# Patient Record
Sex: MALE | Race: WHITE | Employment: FULL TIME
[De-identification: names, ages, dates, MRNs, and addresses within clinical notes are randomized per-mention and may not be internally consistent; named-entity substitution may affect disease eponyms.]

---

## 2017-01-04 ENCOUNTER — NURSE ONLY (OUTPATIENT)
Dept: FAMILY MEDICINE CLINIC | Facility: CLINIC | Age: 57
End: 2017-01-04

## 2017-01-04 DIAGNOSIS — J30.2 SEASONAL ALLERGIC RHINITIS, UNSPECIFIED ALLERGIC RHINITIS TRIGGER: Primary | ICD-10-CM

## 2017-01-04 PROCEDURE — 95115 IMMUNOTHERAPY ONE INJECTION: CPT | Performed by: FAMILY MEDICINE

## 2017-01-18 ENCOUNTER — NURSE ONLY (OUTPATIENT)
Dept: FAMILY MEDICINE CLINIC | Facility: CLINIC | Age: 57
End: 2017-01-18

## 2017-01-18 DIAGNOSIS — J30.1 SEASONAL ALLERGIC RHINITIS DUE TO POLLEN: Primary | ICD-10-CM

## 2017-01-18 PROCEDURE — 95115 IMMUNOTHERAPY ONE INJECTION: CPT | Performed by: FAMILY MEDICINE

## 2017-02-01 ENCOUNTER — OFFICE VISIT (OUTPATIENT)
Dept: FAMILY MEDICINE CLINIC | Facility: CLINIC | Age: 57
End: 2017-02-01

## 2017-02-01 VITALS
BODY MASS INDEX: 33.6 KG/M2 | DIASTOLIC BLOOD PRESSURE: 86 MMHG | SYSTOLIC BLOOD PRESSURE: 128 MMHG | WEIGHT: 240 LBS | HEIGHT: 71 IN

## 2017-02-01 DIAGNOSIS — I10 ESSENTIAL HYPERTENSION: ICD-10-CM

## 2017-02-01 DIAGNOSIS — J30.2 SEASONAL ALLERGIC RHINITIS, UNSPECIFIED ALLERGIC RHINITIS TRIGGER: ICD-10-CM

## 2017-02-01 DIAGNOSIS — R51.9 FREQUENT HEADACHES: Primary | ICD-10-CM

## 2017-02-01 PROCEDURE — 99213 OFFICE O/P EST LOW 20 MIN: CPT | Performed by: FAMILY MEDICINE

## 2017-02-01 ASSESSMENT — ENCOUNTER SYMPTOMS
ORTHOPNEA: 0
BLURRED VISION: 0
SHORTNESS OF BREATH: 0

## 2017-02-06 ASSESSMENT — ENCOUNTER SYMPTOMS: GASTROINTESTINAL NEGATIVE: 1

## 2017-02-15 ENCOUNTER — NURSE ONLY (OUTPATIENT)
Dept: FAMILY MEDICINE CLINIC | Facility: CLINIC | Age: 57
End: 2017-02-15

## 2017-02-15 DIAGNOSIS — J30.2 SEASONAL ALLERGIC RHINITIS, UNSPECIFIED ALLERGIC RHINITIS TRIGGER: Primary | ICD-10-CM

## 2017-02-15 PROCEDURE — 95115 IMMUNOTHERAPY ONE INJECTION: CPT | Performed by: FAMILY MEDICINE

## 2017-03-01 ENCOUNTER — NURSE ONLY (OUTPATIENT)
Dept: FAMILY MEDICINE CLINIC | Facility: CLINIC | Age: 57
End: 2017-03-01

## 2017-03-01 DIAGNOSIS — J30.2 SEASONAL ALLERGIC RHINITIS, UNSPECIFIED ALLERGIC RHINITIS TRIGGER: Primary | ICD-10-CM

## 2017-03-01 PROCEDURE — 95115 IMMUNOTHERAPY ONE INJECTION: CPT | Performed by: FAMILY MEDICINE

## 2017-03-08 RX ORDER — HYDROCHLOROTHIAZIDE 25 MG/1
25 TABLET ORAL DAILY
Qty: 90 TABLET | Refills: 1 | Status: SHIPPED | OUTPATIENT
Start: 2017-03-08 | End: 2017-08-30 | Stop reason: SDUPTHER

## 2017-03-08 RX ORDER — LOSARTAN POTASSIUM 100 MG/1
100 TABLET ORAL DAILY
Qty: 90 TABLET | Refills: 1 | Status: SHIPPED | OUTPATIENT
Start: 2017-03-08 | End: 2017-08-30 | Stop reason: SDUPTHER

## 2017-03-15 ENCOUNTER — NURSE ONLY (OUTPATIENT)
Dept: FAMILY MEDICINE CLINIC | Age: 57
End: 2017-03-15
Payer: COMMERCIAL

## 2017-03-15 DIAGNOSIS — J30.2 SEASONAL ALLERGIC RHINITIS, UNSPECIFIED ALLERGIC RHINITIS TRIGGER: Primary | ICD-10-CM

## 2017-03-15 PROCEDURE — 95115 IMMUNOTHERAPY ONE INJECTION: CPT | Performed by: FAMILY MEDICINE

## 2017-03-29 ENCOUNTER — NURSE ONLY (OUTPATIENT)
Dept: FAMILY MEDICINE CLINIC | Age: 57
End: 2017-03-29
Payer: COMMERCIAL

## 2017-03-29 DIAGNOSIS — J30.2 SEASONAL ALLERGIC RHINITIS, UNSPECIFIED ALLERGIC RHINITIS TRIGGER: Primary | ICD-10-CM

## 2017-03-29 PROCEDURE — 95115 IMMUNOTHERAPY ONE INJECTION: CPT | Performed by: FAMILY MEDICINE

## 2017-04-12 ENCOUNTER — OFFICE VISIT (OUTPATIENT)
Dept: FAMILY MEDICINE CLINIC | Age: 57
End: 2017-04-12
Payer: COMMERCIAL

## 2017-04-12 VITALS
HEART RATE: 80 BPM | OXYGEN SATURATION: 97 % | TEMPERATURE: 98.7 F | SYSTOLIC BLOOD PRESSURE: 134 MMHG | DIASTOLIC BLOOD PRESSURE: 86 MMHG | HEIGHT: 71 IN | WEIGHT: 242 LBS | BODY MASS INDEX: 33.88 KG/M2

## 2017-04-12 DIAGNOSIS — J20.8 ACUTE VIRAL BRONCHITIS: Primary | ICD-10-CM

## 2017-04-12 PROCEDURE — 99213 OFFICE O/P EST LOW 20 MIN: CPT | Performed by: FAMILY MEDICINE

## 2017-04-12 RX ORDER — ALBUTEROL SULFATE 90 UG/1
2 AEROSOL, METERED RESPIRATORY (INHALATION) EVERY 4 HOURS PRN
Qty: 1 INHALER | Refills: 0 | Status: SHIPPED | OUTPATIENT
Start: 2017-04-12 | End: 2018-01-08 | Stop reason: ALTCHOICE

## 2017-04-12 ASSESSMENT — ENCOUNTER SYMPTOMS: GASTROINTESTINAL NEGATIVE: 1

## 2017-05-03 ENCOUNTER — NURSE ONLY (OUTPATIENT)
Dept: FAMILY MEDICINE CLINIC | Age: 57
End: 2017-05-03

## 2017-05-03 DIAGNOSIS — J30.2 SEASONAL ALLERGIC RHINITIS, UNSPECIFIED ALLERGIC RHINITIS TRIGGER: Primary | ICD-10-CM

## 2017-05-17 ENCOUNTER — NURSE ONLY (OUTPATIENT)
Dept: FAMILY MEDICINE CLINIC | Age: 57
End: 2017-05-17
Payer: COMMERCIAL

## 2017-05-17 DIAGNOSIS — J30.2 SEASONAL ALLERGIC RHINITIS, UNSPECIFIED ALLERGIC RHINITIS TRIGGER: Primary | ICD-10-CM

## 2017-05-17 PROCEDURE — 95115 IMMUNOTHERAPY ONE INJECTION: CPT | Performed by: FAMILY MEDICINE

## 2017-05-31 ENCOUNTER — NURSE ONLY (OUTPATIENT)
Dept: FAMILY MEDICINE CLINIC | Age: 57
End: 2017-05-31
Payer: COMMERCIAL

## 2017-05-31 DIAGNOSIS — J30.2 SEASONAL ALLERGIC RHINITIS, UNSPECIFIED ALLERGIC RHINITIS TRIGGER: Primary | ICD-10-CM

## 2017-05-31 PROCEDURE — 95115 IMMUNOTHERAPY ONE INJECTION: CPT | Performed by: FAMILY MEDICINE

## 2017-06-14 ENCOUNTER — NURSE ONLY (OUTPATIENT)
Dept: FAMILY MEDICINE CLINIC | Age: 57
End: 2017-06-14

## 2017-06-14 DIAGNOSIS — J30.89 ENVIRONMENTAL AND SEASONAL ALLERGIES: Primary | ICD-10-CM

## 2017-06-28 ENCOUNTER — NURSE ONLY (OUTPATIENT)
Dept: FAMILY MEDICINE CLINIC | Age: 57
End: 2017-06-28
Payer: COMMERCIAL

## 2017-06-28 DIAGNOSIS — J30.2 SEASONAL ALLERGIC RHINITIS, UNSPECIFIED ALLERGIC RHINITIS TRIGGER: Primary | ICD-10-CM

## 2017-06-28 PROCEDURE — 96372 THER/PROPH/DIAG INJ SC/IM: CPT | Performed by: FAMILY MEDICINE

## 2017-07-12 ENCOUNTER — NURSE ONLY (OUTPATIENT)
Dept: FAMILY MEDICINE CLINIC | Age: 57
End: 2017-07-12
Payer: COMMERCIAL

## 2017-07-12 DIAGNOSIS — J30.2 SEASONAL ALLERGIC RHINITIS, UNSPECIFIED ALLERGIC RHINITIS TRIGGER: Primary | ICD-10-CM

## 2017-07-12 PROCEDURE — 95115 IMMUNOTHERAPY ONE INJECTION: CPT | Performed by: FAMILY MEDICINE

## 2017-07-26 ENCOUNTER — NURSE ONLY (OUTPATIENT)
Dept: FAMILY MEDICINE CLINIC | Age: 57
End: 2017-07-26
Payer: COMMERCIAL

## 2017-07-26 DIAGNOSIS — J30.89 ENVIRONMENTAL AND SEASONAL ALLERGIES: Primary | ICD-10-CM

## 2017-07-26 PROCEDURE — 95115 IMMUNOTHERAPY ONE INJECTION: CPT | Performed by: FAMILY MEDICINE

## 2017-08-09 ENCOUNTER — NURSE ONLY (OUTPATIENT)
Dept: FAMILY MEDICINE CLINIC | Age: 57
End: 2017-08-09
Payer: COMMERCIAL

## 2017-08-09 DIAGNOSIS — J30.89 ENVIRONMENTAL AND SEASONAL ALLERGIES: Primary | ICD-10-CM

## 2017-08-09 PROCEDURE — 95115 IMMUNOTHERAPY ONE INJECTION: CPT | Performed by: FAMILY MEDICINE

## 2017-08-18 LAB
ALT SERPL-CCNC: 27 U/L
AST SERPL-CCNC: 17 U/L
AVERAGE GLUCOSE: 117
BASOPHILS ABSOLUTE: NORMAL /ΜL
BASOPHILS RELATIVE PERCENT: NORMAL %
BUN BLDV-MCNC: 13 MG/DL
CALCIUM SERPL-MCNC: 9.5 MG/DL
CHLORIDE BLD-SCNC: 104 MMOL/L
CHOLESTEROL, TOTAL: 229 MG/DL
CHOLESTEROL/HDL RATIO: 5.5
CO2: 22 MMOL/L
CREAT SERPL-MCNC: 0.91 MG/DL
EOSINOPHILS ABSOLUTE: NORMAL /ΜL
EOSINOPHILS RELATIVE PERCENT: NORMAL %
GFR CALCULATED: >60
GLUCOSE BLD-MCNC: 99 MG/DL
HBA1C MFR BLD: 5.7 %
HCT VFR BLD CALC: 47.7 % (ref 41–53)
HDLC SERPL-MCNC: 42 MG/DL (ref 35–70)
HEMOGLOBIN: 15.8 G/DL (ref 13.5–17.5)
LDL CHOLESTEROL CALCULATED: 160 MG/DL (ref 0–160)
LYMPHOCYTES ABSOLUTE: NORMAL /ΜL
LYMPHOCYTES RELATIVE PERCENT: NORMAL %
MCH RBC QN AUTO: 29.5 PG
MCHC RBC AUTO-ENTMCNC: 33.1 G/DL
MCV RBC AUTO: 89.2 FL
MONOCYTES ABSOLUTE: NORMAL /ΜL
MONOCYTES RELATIVE PERCENT: NORMAL %
NEUTROPHILS ABSOLUTE: NORMAL /ΜL
NEUTROPHILS RELATIVE PERCENT: NORMAL %
PDW BLD-RTO: 13.2 %
PLATELET # BLD: 247 K/ΜL
PMV BLD AUTO: NORMAL FL
POTASSIUM SERPL-SCNC: 4.3 MMOL/L
PROSTATE SPECIFIC ANTIGEN: 2.32 NG/ML
RBC # BLD: 5.36 10^6/ΜL
SODIUM BLD-SCNC: 142 MMOL/L
TRIGL SERPL-MCNC: 137 MG/DL
TSH SERPL DL<=0.05 MIU/L-ACNC: 1.65 UIU/ML
VLDLC SERPL CALC-MCNC: NORMAL MG/DL
WBC # BLD: 6.4 10^3/ML

## 2017-08-23 ENCOUNTER — NURSE ONLY (OUTPATIENT)
Dept: FAMILY MEDICINE CLINIC | Age: 57
End: 2017-08-23

## 2017-08-23 DIAGNOSIS — J30.89 ENVIRONMENTAL AND SEASONAL ALLERGIES: Primary | ICD-10-CM

## 2017-08-30 ENCOUNTER — OFFICE VISIT (OUTPATIENT)
Dept: FAMILY MEDICINE CLINIC | Age: 57
End: 2017-08-30
Payer: COMMERCIAL

## 2017-08-30 VITALS
WEIGHT: 249 LBS | BODY MASS INDEX: 34.86 KG/M2 | HEIGHT: 71 IN | SYSTOLIC BLOOD PRESSURE: 138 MMHG | DIASTOLIC BLOOD PRESSURE: 82 MMHG

## 2017-08-30 DIAGNOSIS — I10 ESSENTIAL HYPERTENSION: ICD-10-CM

## 2017-08-30 DIAGNOSIS — M26.629 TMJ SYNDROME: ICD-10-CM

## 2017-08-30 DIAGNOSIS — J30.89 ENVIRONMENTAL AND SEASONAL ALLERGIES: ICD-10-CM

## 2017-08-30 DIAGNOSIS — E78.00 PURE HYPERCHOLESTEROLEMIA: Primary | ICD-10-CM

## 2017-08-30 PROCEDURE — 99214 OFFICE O/P EST MOD 30 MIN: CPT | Performed by: FAMILY MEDICINE

## 2017-08-30 RX ORDER — LOSARTAN POTASSIUM 100 MG/1
100 TABLET ORAL DAILY
Qty: 90 TABLET | Refills: 1 | Status: SHIPPED | OUTPATIENT
Start: 2017-08-30 | End: 2018-03-09 | Stop reason: SDUPTHER

## 2017-08-30 RX ORDER — HYDROCHLOROTHIAZIDE 25 MG/1
25 TABLET ORAL DAILY
Qty: 90 TABLET | Refills: 1 | Status: SHIPPED | OUTPATIENT
Start: 2017-08-30 | End: 2018-03-09 | Stop reason: SDUPTHER

## 2017-08-31 RX ORDER — ASPIRIN 81 MG/1
81 TABLET ORAL DAILY
Qty: 90 TABLET | Refills: 3
Start: 2017-08-31 | End: 2018-03-09 | Stop reason: SDUPTHER

## 2017-08-31 ASSESSMENT — ENCOUNTER SYMPTOMS
GASTROINTESTINAL NEGATIVE: 1
RESPIRATORY NEGATIVE: 1

## 2017-09-06 ENCOUNTER — NURSE ONLY (OUTPATIENT)
Dept: FAMILY MEDICINE CLINIC | Age: 57
End: 2017-09-06

## 2017-09-06 DIAGNOSIS — J30.2 SEASONAL ALLERGIC RHINITIS, UNSPECIFIED ALLERGIC RHINITIS TRIGGER: Primary | ICD-10-CM

## 2017-09-20 ENCOUNTER — NURSE ONLY (OUTPATIENT)
Dept: FAMILY MEDICINE CLINIC | Age: 57
End: 2017-09-20
Payer: COMMERCIAL

## 2017-09-20 DIAGNOSIS — J30.89 ENVIRONMENTAL AND SEASONAL ALLERGIES: Primary | ICD-10-CM

## 2017-09-20 PROCEDURE — 95115 IMMUNOTHERAPY ONE INJECTION: CPT | Performed by: FAMILY MEDICINE

## 2017-10-04 ENCOUNTER — NURSE ONLY (OUTPATIENT)
Dept: FAMILY MEDICINE CLINIC | Age: 57
End: 2017-10-04

## 2017-10-04 DIAGNOSIS — J30.89 ENVIRONMENTAL AND SEASONAL ALLERGIES: Primary | ICD-10-CM

## 2017-10-25 ENCOUNTER — NURSE ONLY (OUTPATIENT)
Dept: FAMILY MEDICINE CLINIC | Age: 57
End: 2017-10-25
Payer: COMMERCIAL

## 2017-10-25 DIAGNOSIS — Z23 NEED FOR INFLUENZA VACCINATION: Primary | ICD-10-CM

## 2017-10-25 DIAGNOSIS — J30.89 ENVIRONMENTAL AND SEASONAL ALLERGIES: ICD-10-CM

## 2017-10-25 PROCEDURE — 90471 IMMUNIZATION ADMIN: CPT | Performed by: FAMILY MEDICINE

## 2017-10-25 PROCEDURE — 90686 IIV4 VACC NO PRSV 0.5 ML IM: CPT | Performed by: FAMILY MEDICINE

## 2017-10-25 NOTE — PROGRESS NOTES
After obtaining consent, and per orders of Dr. Catherine Sesay, injection of flu vaccine and allergen extract given in Left deltoid by Saul Walters. Patient instructed to remain in clinic for 20 minutes afterwards, and to report any adverse reaction to me immediately.

## 2017-11-08 ENCOUNTER — NURSE ONLY (OUTPATIENT)
Dept: FAMILY MEDICINE CLINIC | Age: 57
End: 2017-11-08
Payer: COMMERCIAL

## 2017-11-08 DIAGNOSIS — J30.89 ENVIRONMENTAL AND SEASONAL ALLERGIES: Primary | ICD-10-CM

## 2017-11-08 PROCEDURE — 96372 THER/PROPH/DIAG INJ SC/IM: CPT | Performed by: FAMILY MEDICINE

## 2017-11-22 ENCOUNTER — NURSE ONLY (OUTPATIENT)
Dept: FAMILY MEDICINE CLINIC | Age: 57
End: 2017-11-22

## 2017-11-22 DIAGNOSIS — J30.89 ENVIRONMENTAL AND SEASONAL ALLERGIES: Primary | ICD-10-CM

## 2017-12-06 ENCOUNTER — NURSE ONLY (OUTPATIENT)
Dept: FAMILY MEDICINE CLINIC | Age: 57
End: 2017-12-06

## 2017-12-06 DIAGNOSIS — J30.2 CHRONIC SEASONAL ALLERGIC RHINITIS, UNSPECIFIED TRIGGER: Primary | ICD-10-CM

## 2017-12-07 ENCOUNTER — HOSPITAL ENCOUNTER (OUTPATIENT)
Dept: GENERAL RADIOLOGY | Age: 57
Discharge: HOME | End: 2017-12-07
Payer: COMMERCIAL

## 2017-12-07 ENCOUNTER — HOSPITAL ENCOUNTER (OUTPATIENT)
Age: 57
Discharge: HOME | End: 2017-12-07
Payer: COMMERCIAL

## 2017-12-07 DIAGNOSIS — M25.561 PAIN IN BOTH KNEES, UNSPECIFIED CHRONICITY: ICD-10-CM

## 2017-12-07 DIAGNOSIS — M25.562 PAIN IN BOTH KNEES, UNSPECIFIED CHRONICITY: ICD-10-CM

## 2017-12-07 PROCEDURE — 73562 X-RAY EXAM OF KNEE 3: CPT

## 2017-12-18 ENCOUNTER — OFFICE VISIT (OUTPATIENT)
Dept: FAMILY MEDICINE CLINIC | Age: 57
End: 2017-12-18
Payer: COMMERCIAL

## 2017-12-18 VITALS
SYSTOLIC BLOOD PRESSURE: 120 MMHG | BODY MASS INDEX: 34.87 KG/M2 | HEART RATE: 78 BPM | WEIGHT: 250 LBS | DIASTOLIC BLOOD PRESSURE: 80 MMHG

## 2017-12-18 DIAGNOSIS — J01.40 ACUTE NON-RECURRENT PANSINUSITIS: Primary | ICD-10-CM

## 2017-12-18 PROCEDURE — 99213 OFFICE O/P EST LOW 20 MIN: CPT | Performed by: FAMILY MEDICINE

## 2017-12-18 RX ORDER — FLUTICASONE PROPIONATE 50 MCG
1 SPRAY, SUSPENSION (ML) NASAL DAILY
Qty: 1 BOTTLE | Refills: 3 | Status: SHIPPED | OUTPATIENT
Start: 2017-12-18 | End: 2018-03-09 | Stop reason: SDUPTHER

## 2017-12-18 RX ORDER — AMOXICILLIN AND CLAVULANATE POTASSIUM 875; 125 MG/1; MG/1
1 TABLET, FILM COATED ORAL 2 TIMES DAILY
Qty: 14 TABLET | Refills: 0 | Status: SHIPPED | OUTPATIENT
Start: 2017-12-18 | End: 2017-12-25

## 2017-12-18 NOTE — PROGRESS NOTES
Patient presents today for sinus pressure.  Patient states he has had this for about 5 days, sinus pressure and \"green nasal drainage\"

## 2017-12-18 NOTE — PROGRESS NOTES
light-headedness. Physical Exam:     Physical Exam   Constitutional: He is oriented to person, place, and time. He appears well-developed and well-nourished. HENT:   Head: Normocephalic and atraumatic. Right Ear: External ear normal.   Left Ear: External ear normal.   Nose: Rhinorrhea present. Right sinus exhibits maxillary sinus tenderness and frontal sinus tenderness. Left sinus exhibits maxillary sinus tenderness and frontal sinus tenderness. Eyes: Conjunctivae and EOM are normal.   Neck: Normal range of motion. Neck supple. No thyromegaly present. Cardiovascular: Normal rate, regular rhythm and normal heart sounds. Exam reveals no gallop and no friction rub. No murmur heard. Pulmonary/Chest: Effort normal and breath sounds normal. No respiratory distress. He has no wheezes. He has no rales. He exhibits no tenderness. Abdominal: Soft. Bowel sounds are normal. He exhibits no distension. There is no tenderness. There is no rebound and no guarding. Musculoskeletal: Normal range of motion. He exhibits no edema. Lymphadenopathy:     He has no cervical adenopathy. Neurological: He is alert and oriented to person, place, and time. He exhibits normal muscle tone. Coordination normal.   Skin: Skin is warm and dry. No rash noted. No erythema. Nursing note and vitals reviewed.       Vitals:  /80   Pulse 78   Wt 250 lb (113.4 kg)   BMI 34.87 kg/m²       Data:     Lab Results   Component Value Date     08/18/2017    K 4.3 08/18/2017     08/18/2017    CO2 22 08/18/2017    BUN 13 08/18/2017    CREATININE 0.91 08/18/2017    GLUCOSE 99 08/18/2017    PROT 6.7 11/23/2016    LABALBU 4.3 11/23/2016    BILITOT 0.39 11/23/2016    ALKPHOS 69 11/23/2016    AST 17 08/18/2017    ALT 27 08/18/2017     Lab Results   Component Value Date    WBC 6.4 08/18/2017    RBC 5.36 08/18/2017    HGB 15.8 08/18/2017    HCT 47.7 08/18/2017    MCV 89.2 08/18/2017    MCH 29.5 08/18/2017    MCHC 33.1 2017    RDW 13.2 2017     2017    MPV NOT REPORTED 2016     Lab Results   Component Value Date    TSH 1.65 2017     Lab Results   Component Value Date    CHOL 229 2017    HDL 42 2017    PSA 2.32 2017    LABA1C 5.7 2017          Assessment:       1. Acute non-recurrent pansinusitis         Plan:   1. Acute nonrecurrent pansinusitisRx Augmentin, Rx Flonase for symptomatic relief. Recommend Tylenol/ibuprofen as needed for pain/fever. Recommend increased fluid intake. Follow-up if not improving. Completed Refills   Requested Prescriptions     Signed Prescriptions Disp Refills    fluticasone (FLONASE ALLERGY RELIEF) 50 MCG/ACT nasal spray 1 Bottle 3     Si spray by Nasal route daily    amoxicillin-clavulanate (AUGMENTIN) 875-125 MG per tablet 14 tablet 0     Sig: Take 1 tablet by mouth 2 times daily for 7 days     Return if symptoms worsen or fail to improve. Orders Placed This Encounter   Medications    fluticasone (FLONASE ALLERGY RELIEF) 50 MCG/ACT nasal spray     Si spray by Nasal route daily     Dispense:  1 Bottle     Refill:  3    amoxicillin-clavulanate (AUGMENTIN) 875-125 MG per tablet     Sig: Take 1 tablet by mouth 2 times daily for 7 days     Dispense:  14 tablet     Refill:  0     No orders of the defined types were placed in this encounter. There are no Patient Instructions on file for this visit.     Electronically signed by Shelia Bronson DO on 2017 at 3:02 PM         Completed Refills   Requested Prescriptions     Signed Prescriptions Disp Refills    fluticasone (FLONASE ALLERGY RELIEF) 50 MCG/ACT nasal spray 1 Bottle 3     Si spray by Nasal route daily    amoxicillin-clavulanate (AUGMENTIN) 875-125 MG per tablet 14 tablet 0     Sig: Take 1 tablet by mouth 2 times daily for 7 days         Kirsten Stagers received counseling on the following healthy behaviors: nutrition, exercise and medication adherence  Reviewed prior labs and health maintenance. Continue current medications, diet and exercise. Discussed use, benefit, and side effects of prescribed medications. Barriers to medication compliance addressed. Patient given educational materials - see patient instructions. All patient questions answered. Patient voiced understanding.

## 2017-12-29 ASSESSMENT — ENCOUNTER SYMPTOMS
SINUS PAIN: 1
WHEEZING: 0
ABDOMINAL DISTENTION: 0
TROUBLE SWALLOWING: 0
COLOR CHANGE: 0
SINUS PRESSURE: 1
DIARRHEA: 0
PHOTOPHOBIA: 0
ABDOMINAL PAIN: 0
BACK PAIN: 0
COUGH: 0
NAUSEA: 0
CONSTIPATION: 0
RHINORRHEA: 1
VOMITING: 0
SHORTNESS OF BREATH: 0
FACIAL SWELLING: 0

## 2018-01-04 ENCOUNTER — NURSE ONLY (OUTPATIENT)
Dept: FAMILY MEDICINE CLINIC | Age: 58
End: 2018-01-04

## 2018-01-04 DIAGNOSIS — J30.89 ENVIRONMENTAL AND SEASONAL ALLERGIES: Primary | ICD-10-CM

## 2018-01-05 ENCOUNTER — TELEPHONE (OUTPATIENT)
Dept: FAMILY MEDICINE CLINIC | Age: 58
End: 2018-01-05

## 2018-01-05 RX ORDER — AZITHROMYCIN 250 MG/1
TABLET, FILM COATED ORAL
Qty: 1 PACKET | Refills: 0 | Status: SHIPPED | OUTPATIENT
Start: 2018-01-05 | End: 2018-01-08 | Stop reason: ALTCHOICE

## 2018-01-05 NOTE — TELEPHONE ENCOUNTER
Daniel Shaikh was in the office on 12/18 for Sinusitis. He was given amoxicillin. Wife said he had gotten better but is now right where he was before. He is having the sinus pressure, drainage, cough, chest congestion. She would like to know if we can just call in a refill for him. Please let Khalif know. DM-maximilian    Health Maintenance   Topic Date Due    Potassium monitoring  1960    Creatinine monitoring  1962    Hepatitis C screen  1960    HIV screen  04/04/1977    DTaP/Tdap/Td vaccine (1 - Tdap) 04/04/1981    A1C test (Diabetic or Prediabetic)  08/18/2018    Lipid screen  08/18/2022    Colon cancer screen colonoscopy  07/11/2026    Flu vaccine  Completed             (applicable per patient's age: Cancer Screenings, Depression Screening, Fall Risk Screening, Immunizations)    Hemoglobin A1C (%)   Date Value   08/18/2017 5.7     LDL Cholesterol (mg/dL)   Date Value   02/13/2016 125     LDL Calculated (mg/dL)   Date Value   08/18/2017 160     AST (U/L)   Date Value   08/18/2017 17     ALT (U/L)   Date Value   08/18/2017 27     BUN (mg/dL)   Date Value   08/18/2017 13      (goal A1C is < 7)   (goal LDL is <100) need 30-50% reduction from baseline     BP Readings from Last 3 Encounters:   12/18/17 120/80   08/30/17 138/82   04/12/17 134/86    (goal /80)      All Future Testing planned in [facility]:  Lab Frequency Next Occurrence       Next Visit Date:  No future appointments.          Patient Active Problem List:     HTN (hypertension)     GERD (gastroesophageal reflux disease)     Seasonal allergies     Pure hypercholesterolemia     Arthritis     Encounter for screening colonoscopy     Environmental and seasonal allergies

## 2018-01-08 ENCOUNTER — HOSPITAL ENCOUNTER (OUTPATIENT)
Dept: GENERAL RADIOLOGY | Age: 58
Discharge: HOME | End: 2018-01-08
Payer: COMMERCIAL

## 2018-01-08 ENCOUNTER — HOSPITAL ENCOUNTER (OUTPATIENT)
Age: 58
Discharge: HOME | End: 2018-01-08
Payer: COMMERCIAL

## 2018-01-08 ENCOUNTER — OFFICE VISIT (OUTPATIENT)
Dept: FAMILY MEDICINE CLINIC | Age: 58
End: 2018-01-08
Payer: COMMERCIAL

## 2018-01-08 VITALS
SYSTOLIC BLOOD PRESSURE: 115 MMHG | WEIGHT: 250 LBS | OXYGEN SATURATION: 96 % | TEMPERATURE: 99.4 F | BODY MASS INDEX: 34.87 KG/M2 | HEART RATE: 80 BPM | DIASTOLIC BLOOD PRESSURE: 82 MMHG

## 2018-01-08 DIAGNOSIS — R05.9 COUGH: ICD-10-CM

## 2018-01-08 DIAGNOSIS — J40 BRONCHITIS: Primary | ICD-10-CM

## 2018-01-08 PROCEDURE — 71046 X-RAY EXAM CHEST 2 VIEWS: CPT

## 2018-01-08 PROCEDURE — 99213 OFFICE O/P EST LOW 20 MIN: CPT | Performed by: FAMILY MEDICINE

## 2018-01-08 RX ORDER — LEVOFLOXACIN 500 MG/1
500 TABLET, FILM COATED ORAL DAILY
Qty: 7 TABLET | Refills: 0 | Status: SHIPPED | OUTPATIENT
Start: 2018-01-08 | End: 2018-01-15

## 2018-01-14 ASSESSMENT — ENCOUNTER SYMPTOMS
SHORTNESS OF BREATH: 0
FACIAL SWELLING: 0
ABDOMINAL DISTENTION: 0
WHEEZING: 0
COLOR CHANGE: 0
DIARRHEA: 0
PHOTOPHOBIA: 0
RHINORRHEA: 1
COUGH: 1
TROUBLE SWALLOWING: 0
CONSTIPATION: 0
NAUSEA: 0
ABDOMINAL PAIN: 0
VOMITING: 0
BACK PAIN: 0

## 2018-01-14 NOTE — PROGRESS NOTES
12/18/17  Yes Irl Garden, DO   aspirin EC 81 MG EC tablet Take 1 tablet by mouth daily 8/31/17  Yes Casimer Prior, DO   hydrochlorothiazide (HYDRODIURIL) 25 MG tablet Take 1 tablet by mouth daily 8/30/17  Yes Casimer Prior, DO   losartan (COZAAR) 100 MG tablet Take 1 tablet by mouth daily 8/30/17  Yes Casimer Prior, DO   butalbital-acetaminophen-caffeine (FIORICET, ESGIC) -46 MG per tablet Take 1-2 tablets by mouth every 6 hours as needed for Headaches 12/28/16  Yes Casimer Prior, DO   GARLIC PO Take by mouth daily    Yes Historical Provider, MD   Omega-3 Fatty Acids (FISH OIL) 1200 MG CPDR Take by mouth 3 times daily    Yes Historical Provider, MD Fausto TAMEZ by Does not apply route 2 times daily    Yes Historical Provider, MD        Allergies:       Pneumococcal vaccines; Statins; and Tetracyclines & related    Social History:     Tobacco:    reports that he has never smoked. He has never used smokeless tobacco.  Alcohol:      reports that he drinks about 2.4 oz of alcohol per week . Drug Use:  reports that he does not use drugs. Family History:     Family History   Problem Relation Age of Onset    Arthritis Mother     Heart Attack Father        Review of Systems:     Positive and Negative as described in HPI    Review of Systems   Constitutional: Positive for chills. Negative for activity change, appetite change, fever and unexpected weight change. HENT: Positive for rhinorrhea. Negative for ear pain, facial swelling and trouble swallowing. Eyes: Negative for photophobia and visual disturbance. Respiratory: Positive for cough. Negative for shortness of breath and wheezing. Cardiovascular: Negative for chest pain and palpitations. Gastrointestinal: Negative for abdominal distention, abdominal pain, constipation, diarrhea, nausea and vomiting. Endocrine: Negative for polydipsia, polyphagia and polyuria.    Musculoskeletal: Negative for arthralgias, back pain, gait visit. Electronically signed by Sal Lang DO on 1/14/2018 at 2:30 PM         Completed Refills   Requested Prescriptions     Signed Prescriptions Disp Refills    levofloxacin (LEVAQUIN) 500 MG tablet 7 tablet 0     Sig: Take 1 tablet by mouth daily for 7 days    HYDROcodone-homatropine (HYCODAN) 5-1.5 MG/5ML syrup 140 mL 0     Sig: Take 5 mLs by mouth 4 times daily as needed (cough) for up to 7 days. Afsaneh [place] received counseling on the following healthy behaviors: nutrition, exercise and medication adherence  Reviewed prior labs and health maintenance. Continue current medications, diet and exercise. Discussed use, benefit, and side effects of prescribed medications. Barriers to medication compliance addressed. Patient given educational materials - see patient instructions. All patient questions answered. Patient voiced understanding.

## 2018-01-25 ENCOUNTER — OFFICE VISIT (OUTPATIENT)
Dept: FAMILY MEDICINE CLINIC | Age: 58
End: 2018-01-25
Payer: COMMERCIAL

## 2018-01-25 VITALS
HEART RATE: 75 BPM | WEIGHT: 253 LBS | HEIGHT: 71 IN | TEMPERATURE: 99.2 F | SYSTOLIC BLOOD PRESSURE: 110 MMHG | DIASTOLIC BLOOD PRESSURE: 80 MMHG | BODY MASS INDEX: 35.42 KG/M2 | OXYGEN SATURATION: 97 %

## 2018-01-25 DIAGNOSIS — J01.40 ACUTE NON-RECURRENT PANSINUSITIS: Primary | ICD-10-CM

## 2018-01-25 PROCEDURE — 99213 OFFICE O/P EST LOW 20 MIN: CPT | Performed by: NURSE PRACTITIONER

## 2018-01-25 RX ORDER — AMOXICILLIN AND CLAVULANATE POTASSIUM 875; 125 MG/1; MG/1
1 TABLET, FILM COATED ORAL 2 TIMES DAILY
Qty: 20 TABLET | Refills: 0 | Status: SHIPPED | OUTPATIENT
Start: 2018-01-25 | End: 2018-02-04

## 2018-01-25 ASSESSMENT — ENCOUNTER SYMPTOMS
VOMITING: 0
SINUS PAIN: 1
NAUSEA: 0
SHORTNESS OF BREATH: 0
SINUS PRESSURE: 1
HOARSE VOICE: 1
DIARRHEA: 0
COUGH: 1

## 2018-01-25 ASSESSMENT — PATIENT HEALTH QUESTIONNAIRE - PHQ9
SUM OF ALL RESPONSES TO PHQ9 QUESTIONS 1 & 2: 0
SUM OF ALL RESPONSES TO PHQ QUESTIONS 1-9: 0
1. LITTLE INTEREST OR PLEASURE IN DOING THINGS: 0
2. FEELING DOWN, DEPRESSED OR HOPELESS: 0

## 2018-01-25 NOTE — PROGRESS NOTES
End Date Taking? Authorizing Provider   amoxicillin-clavulanate (AUGMENTIN) 875-125 MG per tablet Take 1 tablet by mouth 2 times daily for 10 days 1/25/18 2/4/18 Yes Bryon Mckeon CNP   fluticasone [facility] ALLERGY RELIEF) 50 MCG/ACT nasal spray 1 spray by Nasal route daily 12/18/17  Yes Osiel Beavers DO   aspirin EC 81 MG EC tablet Take 1 tablet by mouth daily 8/31/17  Yes Shaune Schaumann, DO   hydrochlorothiazide (HYDRODIURIL) 25 MG tablet Take 1 tablet by mouth daily 8/30/17  Yes Shaune Schaumann, DO   losartan (COZAAR) 100 MG tablet Take 1 tablet by mouth daily 8/30/17  Yes Shaune Schaumann, DO   GARLIC PO Take by mouth daily    Yes Historical Provider, MD   Omega-3 Fatty Acids (FISH OIL) 1200 MG CPDR Take by mouth 3 times daily    Yes Historical Provider, MD Matilda Abbott POWD by Does not apply route 2 times daily    Yes Historical Provider, MD        Allergies:       Pneumococcal vaccines; Statins; and Tetracyclines & related    Social History:     Tobacco:    reports that he has never smoked. He quit smokeless tobacco use about 10 years ago. His smokeless tobacco use included Snuff. Alcohol:      reports that he drinks about 2.4 oz of alcohol per week . Drug Use:  reports that he does not use drugs. Family History:     Family History   Problem Relation Age of Onset    Arthritis Mother     Heart Attack Father        Review of Systems:         Review of Systems   Constitutional: Positive for fever. Negative for chills. HENT: Positive for congestion, hoarse voice, postnasal drip, sinus pain and sinus pressure. Respiratory: Positive for cough. Negative for shortness of breath. Cardiovascular: Negative for chest pain and palpitations. Gastrointestinal: Negative for diarrhea, nausea and vomiting.          Physical Exam:     Vitals:  /80   Pulse 75   Temp 99.2 °F (37.3 °C) (Oral)   Ht 5' 11\" (1.803 m)   Wt 253 lb (114.8 kg)   SpO2 97%   BMI 35.29 kg/m²       Physical Exam Constitutional: He appears well-developed and well-nourished. He is cooperative. HENT:   Right Ear: Tympanic membrane normal.   Left Ear: Tympanic membrane normal.   Nose: Mucosal edema present. Right sinus exhibits maxillary sinus tenderness and frontal sinus tenderness. Left sinus exhibits maxillary sinus tenderness and frontal sinus tenderness. Mouth/Throat: Posterior oropharyngeal erythema present. No oropharyngeal exudate. Cardiovascular: Normal rate, regular rhythm, S1 normal, S2 normal and normal heart sounds. Pulmonary/Chest: Effort normal and breath sounds normal. No respiratory distress. Neurological: He is alert. Nursing note and vitals reviewed. Data:     Lab Results   Component Value Date     08/18/2017    K 4.3 08/18/2017     08/18/2017    CO2 22 08/18/2017    BUN 13 08/18/2017    CREATININE 0.91 08/18/2017    GLUCOSE 99 08/18/2017    PROT 6.7 11/23/2016    LABALBU 4.3 11/23/2016    BILITOT 0.39 11/23/2016    ALKPHOS 69 11/23/2016    AST 17 08/18/2017    ALT 27 08/18/2017     Lab Results   Component Value Date    WBC 6.4 08/18/2017    RBC 5.36 08/18/2017    HGB 15.8 08/18/2017    HCT 47.7 08/18/2017    MCV 89.2 08/18/2017    MCH 29.5 08/18/2017    MCHC 33.1 08/18/2017    RDW 13.2 08/18/2017     08/18/2017    MPV NOT REPORTED 12/13/2016     Lab Results   Component Value Date    TSH 1.65 08/18/2017     Lab Results   Component Value Date    CHOL 229 08/18/2017    HDL 42 08/18/2017    PSA 2.32 08/18/2017    LABA1C 5.7 08/18/2017          Assessment & Plan       1. Acute non-recurrent pansinusitis       We'll treat patient with Augmentin. Patient educated about medication possible side effects. Patient instructed to continue drinking lots of fluids and getting some rest.  Patient may continue using Flonase 1 spray each nostril twice a day. May continue taking Claritin. Patient verbalizes understanding and agreement with plan. All questions answered.  If

## 2018-01-25 NOTE — PATIENT INSTRUCTIONS
SURVEY:    You may be receiving a survey from [employer] regarding your visit today. Please complete the survey to enable us to provide the highest quality of care to you and your family. If you cannot score us a very good on any question, please call the office to discuss how we could of made your experience a very good one. Thank you.

## 2018-03-09 ENCOUNTER — OFFICE VISIT (OUTPATIENT)
Dept: FAMILY MEDICINE CLINIC | Age: 58
End: 2018-03-09
Payer: COMMERCIAL

## 2018-03-09 VITALS
BODY MASS INDEX: 35.14 KG/M2 | SYSTOLIC BLOOD PRESSURE: 122 MMHG | HEIGHT: 71 IN | DIASTOLIC BLOOD PRESSURE: 82 MMHG | WEIGHT: 251 LBS

## 2018-03-09 DIAGNOSIS — E78.00 PURE HYPERCHOLESTEROLEMIA: ICD-10-CM

## 2018-03-09 DIAGNOSIS — I10 ESSENTIAL HYPERTENSION: Primary | ICD-10-CM

## 2018-03-09 DIAGNOSIS — J30.89 ENVIRONMENTAL AND SEASONAL ALLERGIES: ICD-10-CM

## 2018-03-09 PROCEDURE — 99214 OFFICE O/P EST MOD 30 MIN: CPT | Performed by: FAMILY MEDICINE

## 2018-03-09 RX ORDER — FLUTICASONE PROPIONATE 50 MCG
1 SPRAY, SUSPENSION (ML) NASAL DAILY
Qty: 1 BOTTLE | Refills: 3 | Status: SHIPPED | OUTPATIENT
Start: 2018-03-09

## 2018-03-09 RX ORDER — HYDROCHLOROTHIAZIDE 25 MG/1
25 TABLET ORAL DAILY
Qty: 90 TABLET | Refills: 1 | Status: SHIPPED | OUTPATIENT
Start: 2018-03-09 | End: 2018-08-31 | Stop reason: SDUPTHER

## 2018-03-09 RX ORDER — ASPIRIN 81 MG/1
81 TABLET ORAL DAILY
Qty: 90 TABLET | Refills: 3 | Status: SHIPPED | OUTPATIENT
Start: 2018-03-09 | End: 2019-03-06 | Stop reason: SDUPTHER

## 2018-03-09 RX ORDER — LOSARTAN POTASSIUM 100 MG/1
100 TABLET ORAL DAILY
Qty: 90 TABLET | Refills: 1 | Status: SHIPPED | OUTPATIENT
Start: 2018-03-09 | End: 2018-08-31 | Stop reason: SDUPTHER

## 2018-03-09 ASSESSMENT — PATIENT HEALTH QUESTIONNAIRE - PHQ9
SUM OF ALL RESPONSES TO PHQ QUESTIONS 1-9: 0
2. FEELING DOWN, DEPRESSED OR HOPELESS: 0
SUM OF ALL RESPONSES TO PHQ9 QUESTIONS 1 & 2: 0
1. LITTLE INTEREST OR PLEASURE IN DOING THINGS: 0

## 2018-03-09 NOTE — PROGRESS NOTES
Yes Historical Provider, MD Grey Milena POWD by Does not apply route 2 times daily    Yes Historical Provider, MD        Allergies:       Pneumococcal vaccines; Statins; and Tetracyclines & related    Social History:     Tobacco:    reports that he has never smoked. He quit smokeless tobacco use about 10 years ago. His smokeless tobacco use included Snuff. Alcohol:      reports that he drinks about 2.4 oz of alcohol per week . Drug Use:  reports that he does not use drugs. Family History:     Family History   Problem Relation Age of Onset    Arthritis Mother     Heart Attack Father        Review of Systems:     Positive and Negative as described in HPI    Review of Systems   Constitutional: Negative. Respiratory: Negative. Gastrointestinal: Negative. Physical Exam:     Vitals:  /82   Ht 5' 11\" (1.803 m)   Wt 251 lb (113.9 kg)   BMI 35.01 kg/m²   Physical Exam   Constitutional: He appears well-developed and well-nourished. No distress. HENT:   Right Ear: Tympanic membrane and ear canal normal.   Left Ear: Tympanic membrane and ear canal normal.   Nose: Nose normal.   Mouth/Throat: Oropharynx is clear and moist and mucous membranes are normal.   Eyes: Conjunctivae and EOM are normal. Pupils are equal, round, and reactive to light. Neck: Neck supple. Cardiovascular: Normal rate, regular rhythm, normal heart sounds and intact distal pulses. No peripheral edema. Pulmonary/Chest: Effort normal and breath sounds normal.   Musculoskeletal: He exhibits no edema. Lymphadenopathy:     He has no cervical adenopathy. Skin: Skin is warm and dry. Psychiatric: He has a normal mood and affect. Judgment normal.   Nursing note and vitals reviewed.       Data:     Lab Results   Component Value Date     08/18/2017    K 4.3 08/18/2017     08/18/2017    CO2 22 08/18/2017    BUN 13 08/18/2017    CREATININE 0.91 08/18/2017    GLUCOSE 99 08/18/2017    PROT 6.7 11/23/2016 LABALBU 4.3 2016    BILITOT 0.39 2016    ALKPHOS 69 2016    AST 17 2017    ALT 27 2017     Lab Results   Component Value Date    WBC 6.4 2017    RBC 5.36 2017    HGB 15.8 2017    HCT 47.7 2017    MCV 89.2 2017    MCH 29.5 2017    MCHC 33.1 2017    RDW 13.2 2017     2017    MPV NOT REPORTED 2016     Lab Results   Component Value Date    TSH 1.65 2017     Lab Results   Component Value Date    CHOL 229 2017    HDL 42 2017    PSA 2.32 2017    LABA1C 5.7 2017         Assessment & Plan:       1. Essential hypertension     2. Pure hypercholesterolemia     3. Environmental and seasonal allergies     HTN controlled, cont current regimen. Consider decreasing losartan dose in next few mos if readings remain at goal.  HLD intolerant of statins. Cont to work on diet and exercise. Start baby ASA daily for cardiovascular protection. Labs in August.  Allergic rhinitis controlled on flonase. Stay off IT injections as long as able, then can restart per Dr Donnell France guidance. Requested Prescriptions     Signed Prescriptions Disp Refills    aspirin EC 81 MG EC tablet 90 tablet 3     Sig: Take 1 tablet by mouth daily    hydrochlorothiazide (HYDRODIURIL) 25 MG tablet 90 tablet 1     Sig: Take 1 tablet by mouth daily    losartan (COZAAR) 100 MG tablet 90 tablet 1     Sig: Take 1 tablet by mouth daily    fluticasone (FLONASE ALLERGY RELIEF) 50 MCG/ACT nasal spray 1 Bottle 3     Si spray by Nasal route daily       Patient Instructions     SURVEY:    You may be receiving a survey from [employer] regarding your visit today. Please complete the survey to enable us to provide the highest quality of care to you and your family. If you cannot score us as very good on any question, please call the office to discuss how we could have made your experience exceptional.     Thank you.         Moriah Lane received counseling on the following healthy behaviors: nutrition, exercise and medication adherence  Reviewed prior labs and health maintenance. Continue current medications, diet and exercise. Discussed use, benefit, and side effects of prescribed medications. Barriers to medication compliance addressed. Patient given educational materials - see patient instructions. All patient questions answered. Patient voiced understanding.      Electronically signed by Brenda Badillo DO on 3/11/2018 at 8:49 PM

## 2018-03-11 ASSESSMENT — ENCOUNTER SYMPTOMS
RESPIRATORY NEGATIVE: 1
GASTROINTESTINAL NEGATIVE: 1

## 2018-05-01 ENCOUNTER — HOSPITAL ENCOUNTER (OUTPATIENT)
Dept: GENERAL RADIOLOGY | Age: 58
Discharge: HOME | End: 2018-05-03
Payer: COMMERCIAL

## 2018-05-01 ENCOUNTER — OFFICE VISIT (OUTPATIENT)
Dept: FAMILY MEDICINE CLINIC | Age: 58
End: 2018-05-01
Payer: COMMERCIAL

## 2018-05-01 ENCOUNTER — HOSPITAL ENCOUNTER (OUTPATIENT)
Age: 58
Discharge: HOME | End: 2018-05-03
Payer: COMMERCIAL

## 2018-05-01 ENCOUNTER — TELEPHONE (OUTPATIENT)
Dept: FAMILY MEDICINE CLINIC | Age: 58
End: 2018-05-01

## 2018-05-01 VITALS
OXYGEN SATURATION: 98 % | SYSTOLIC BLOOD PRESSURE: 124 MMHG | BODY MASS INDEX: 35.01 KG/M2 | HEART RATE: 106 BPM | DIASTOLIC BLOOD PRESSURE: 88 MMHG | WEIGHT: 251 LBS

## 2018-05-01 DIAGNOSIS — M79.672 LEFT FOOT PAIN: Primary | ICD-10-CM

## 2018-05-01 DIAGNOSIS — S92.902A FOOT FRACTURE, LEFT, CLOSED, INITIAL ENCOUNTER: Primary | ICD-10-CM

## 2018-05-01 DIAGNOSIS — M79.672 LEFT FOOT PAIN: ICD-10-CM

## 2018-05-01 PROCEDURE — 73630 X-RAY EXAM OF FOOT: CPT

## 2018-05-01 PROCEDURE — 99213 OFFICE O/P EST LOW 20 MIN: CPT | Performed by: FAMILY MEDICINE

## 2018-05-01 ASSESSMENT — ENCOUNTER SYMPTOMS
VOMITING: 0
ABDOMINAL DISTENTION: 0
COUGH: 0
DIARRHEA: 0
COLOR CHANGE: 0
WHEEZING: 0
BACK PAIN: 0
ABDOMINAL PAIN: 0
SHORTNESS OF BREATH: 0
NAUSEA: 0
FACIAL SWELLING: 0
PHOTOPHOBIA: 0
TROUBLE SWALLOWING: 0
CONSTIPATION: 0

## 2018-07-25 ENCOUNTER — OFFICE VISIT (OUTPATIENT)
Dept: FAMILY MEDICINE CLINIC | Age: 58
End: 2018-07-25
Payer: COMMERCIAL

## 2018-07-25 VITALS
DIASTOLIC BLOOD PRESSURE: 88 MMHG | SYSTOLIC BLOOD PRESSURE: 136 MMHG | BODY MASS INDEX: 34.45 KG/M2 | OXYGEN SATURATION: 97 % | WEIGHT: 247 LBS | HEART RATE: 88 BPM | TEMPERATURE: 98 F

## 2018-07-25 DIAGNOSIS — J01.40 ACUTE NON-RECURRENT PANSINUSITIS: Primary | ICD-10-CM

## 2018-07-25 PROCEDURE — 99213 OFFICE O/P EST LOW 20 MIN: CPT | Performed by: NURSE PRACTITIONER

## 2018-07-25 RX ORDER — AMOXICILLIN AND CLAVULANATE POTASSIUM 875; 125 MG/1; MG/1
1 TABLET, FILM COATED ORAL 2 TIMES DAILY
Qty: 14 TABLET | Refills: 0 | Status: SHIPPED | OUTPATIENT
Start: 2018-07-25 | End: 2018-08-01

## 2018-07-25 ASSESSMENT — ENCOUNTER SYMPTOMS
DIARRHEA: 0
SORE THROAT: 1
HOARSE VOICE: 1
COUGH: 1
NAUSEA: 0
SINUS PRESSURE: 1
VOMITING: 0
SHORTNESS OF BREATH: 0

## 2018-08-31 ENCOUNTER — OFFICE VISIT (OUTPATIENT)
Dept: FAMILY MEDICINE CLINIC | Age: 58
End: 2018-08-31
Payer: COMMERCIAL

## 2018-08-31 VITALS
SYSTOLIC BLOOD PRESSURE: 138 MMHG | HEIGHT: 71 IN | WEIGHT: 249 LBS | DIASTOLIC BLOOD PRESSURE: 78 MMHG | BODY MASS INDEX: 34.86 KG/M2

## 2018-08-31 DIAGNOSIS — E78.00 PURE HYPERCHOLESTEROLEMIA: ICD-10-CM

## 2018-08-31 DIAGNOSIS — I10 ESSENTIAL HYPERTENSION: Primary | ICD-10-CM

## 2018-08-31 DIAGNOSIS — J30.9 CHRONIC ALLERGIC RHINITIS: ICD-10-CM

## 2018-08-31 DIAGNOSIS — Z13.1 SCREENING FOR DIABETES MELLITUS (DM): ICD-10-CM

## 2018-08-31 LAB — HBA1C MFR BLD: 5.8 %

## 2018-08-31 PROCEDURE — 99214 OFFICE O/P EST MOD 30 MIN: CPT | Performed by: FAMILY MEDICINE

## 2018-08-31 PROCEDURE — 83036 HEMOGLOBIN GLYCOSYLATED A1C: CPT | Performed by: FAMILY MEDICINE

## 2018-08-31 RX ORDER — HYDROCHLOROTHIAZIDE 25 MG/1
25 TABLET ORAL DAILY
Qty: 90 TABLET | Refills: 1 | Status: SHIPPED | OUTPATIENT
Start: 2018-08-31 | End: 2019-03-06 | Stop reason: SDUPTHER

## 2018-08-31 RX ORDER — LOSARTAN POTASSIUM 100 MG/1
100 TABLET ORAL DAILY
Qty: 90 TABLET | Refills: 1 | Status: SHIPPED | OUTPATIENT
Start: 2018-08-31 | End: 2019-03-06 | Stop reason: SDUPTHER

## 2018-08-31 RX ORDER — FLUTICASONE PROPIONATE 50 MCG
1 SPRAY, SUSPENSION (ML) NASAL DAILY
Qty: 1 BOTTLE | Refills: 3 | Status: CANCELLED | OUTPATIENT
Start: 2018-08-31

## 2018-08-31 ASSESSMENT — PATIENT HEALTH QUESTIONNAIRE - PHQ9
1. LITTLE INTEREST OR PLEASURE IN DOING THINGS: 0
SUM OF ALL RESPONSES TO PHQ QUESTIONS 1-9: 0
SUM OF ALL RESPONSES TO PHQ QUESTIONS 1-9: 0
SUM OF ALL RESPONSES TO PHQ9 QUESTIONS 1 & 2: 0
2. FEELING DOWN, DEPRESSED OR HOPELESS: 0

## 2018-08-31 NOTE — PROGRESS NOTES
33.1 08/18/2017    RDW 13.2 08/18/2017     08/18/2017    MPV NOT REPORTED 12/13/2016     Lab Results   Component Value Date    TSH 1.65 08/18/2017     Lab Results   Component Value Date    CHOL 229 08/18/2017    HDL 42 08/18/2017    PSA 2.32 08/18/2017    LABA1C 5.8 08/31/2018         Assessment & Plan:        Diagnosis Orders   1. Essential hypertension     2. Pure hypercholesterolemia     3. Chronic allergic rhinitis     4. Screening for diabetes mellitus (DM)  POCT glycosylated hemoglobin (Hb A1C)   HTN higher but still controlled. Cont current rx. Work on weight loss to get bp back down.  hld per previous labs - update in next few mos. Doesn't tolerate statins so again needs to work on diet and exercise. Allergic rhinitis s/p many yrs of immunotherapy, now doing very well. Occasional flonase. Continue same. a1c higher at 5.8%. Discussed. F/u 6 mo. Requested Prescriptions     Signed Prescriptions Disp Refills    losartan (COZAAR) 100 MG tablet 90 tablet 1     Sig: Take 1 tablet by mouth daily    hydrochlorothiazide (HYDRODIURIL) 25 MG tablet 90 tablet 1     Sig: Take 1 tablet by mouth daily       Patient Instructions     SURVEY:    You may be receiving a survey from [employer] regarding your visit today. Please complete the survey to enable us to provide the highest quality of care to you and your family. If you cannot score us as very good on any question, please call the office to discuss how we could have made your experience exceptional.     Thank you. Adriano Jasso received counseling on the following healthy behaviors: nutrition, exercise and medication adherence  Reviewed prior labs and health maintenance. Continue current medications, diet and exercise. Discussed use, benefit, and side effects of prescribed medications. Barriers to medication compliance addressed. Patient given educational materials - see patient instructions. All patient questions answered.   Patient voiced understanding.      Electronically signed by Kevin Mart DO on 9/4/2018 at 12:06 AM   [address] [address]  Dept: [phone]

## 2018-09-04 ASSESSMENT — ENCOUNTER SYMPTOMS
GASTROINTESTINAL NEGATIVE: 1
RESPIRATORY NEGATIVE: 1

## 2018-11-15 ENCOUNTER — HOSPITAL ENCOUNTER (OUTPATIENT)
Dept: GENERAL RADIOLOGY | Age: 58
Discharge: HOME | End: 2018-11-17
Payer: COMMERCIAL

## 2018-11-15 ENCOUNTER — HOSPITAL ENCOUNTER (OUTPATIENT)
Age: 58
Discharge: HOME | End: 2018-11-17
Payer: COMMERCIAL

## 2018-11-15 DIAGNOSIS — M25.551 PAIN OF RIGHT HIP JOINT: ICD-10-CM

## 2018-11-15 PROCEDURE — 72110 X-RAY EXAM L-2 SPINE 4/>VWS: CPT

## 2018-11-15 PROCEDURE — 73502 X-RAY EXAM HIP UNI 2-3 VIEWS: CPT

## 2018-12-13 ENCOUNTER — OFFICE VISIT (OUTPATIENT)
Dept: PRIMARY CARE CLINIC | Age: 58
End: 2018-12-13
Payer: COMMERCIAL

## 2018-12-13 VITALS
BODY MASS INDEX: 35.42 KG/M2 | HEART RATE: 78 BPM | SYSTOLIC BLOOD PRESSURE: 142 MMHG | DIASTOLIC BLOOD PRESSURE: 96 MMHG | HEIGHT: 71 IN | OXYGEN SATURATION: 96 % | TEMPERATURE: 98.9 F | WEIGHT: 253 LBS

## 2018-12-13 DIAGNOSIS — J20.9 BRONCHITIS, ACUTE, WITH BRONCHOSPASM: ICD-10-CM

## 2018-12-13 DIAGNOSIS — R52 BODY ACHES: Primary | ICD-10-CM

## 2018-12-13 LAB
INFLUENZA A ANTIBODY: NORMAL
INFLUENZA B ANTIBODY: NORMAL

## 2018-12-13 PROCEDURE — 99213 OFFICE O/P EST LOW 20 MIN: CPT | Performed by: NURSE PRACTITIONER

## 2018-12-13 PROCEDURE — 87804 INFLUENZA ASSAY W/OPTIC: CPT | Performed by: NURSE PRACTITIONER

## 2018-12-13 RX ORDER — AMOXICILLIN 875 MG/1
875 TABLET, COATED ORAL EVERY 12 HOURS
Qty: 20 TABLET | Refills: 0 | Status: SHIPPED | OUTPATIENT
Start: 2018-12-13 | End: 2018-12-23

## 2018-12-13 ASSESSMENT — ENCOUNTER SYMPTOMS
GASTROINTESTINAL NEGATIVE: 1
EYES NEGATIVE: 1
SINUS PRESSURE: 0
COUGH: 1
RHINORRHEA: 1
SORE THROAT: 1
WHEEZING: 1

## 2018-12-13 NOTE — PATIENT INSTRUCTIONS
SURVEY:    You may be receiving a survey from [employer] regarding your visit today. Please complete the survey to enable us to provide the highest quality of care to you and your family. If you cannot score us a very good on any question, please call the office to discuss how we could of made your experience a very good one. Thank you. Patient Education        Bronchitis: Care Instructions  Your Care Instructions    Bronchitis is inflammation of the bronchial tubes, which carry air to the lungs. The tubes swell and produce mucus, or phlegm. The mucus and inflamed bronchial tubes make you cough. You may have trouble breathing. Most cases of bronchitis are caused by viruses like those that cause colds. Antibiotics usually do not help and they may be harmful. Bronchitis usually develops rapidly and lasts about 2 to 3 weeks in otherwise healthy people. Follow-up care is a key part of your treatment and safety. Be sure to make and go to all appointments, and call your doctor if you are having problems. It's also a good idea to know your test results and keep a list of the medicines you take. How can you care for yourself at home? · Take all medicines exactly as prescribed. Call your doctor if you think you are having a problem with your medicine. · Get some extra rest.  · Take an over-the-counter pain medicine, such as acetaminophen (Tylenol), ibuprofen (Advil, Motrin), or naproxen (Aleve) to reduce fever and relieve body aches. Read and follow all instructions on the label. · Do not take two or more pain medicines at the same time unless the doctor told you to. Many pain medicines have acetaminophen, which is Tylenol. Too much acetaminophen (Tylenol) can be harmful. · Take an over-the-counter cough medicine that contains dextromethorphan to help quiet a dry, hacking cough so that you can sleep. Avoid cough medicines that have more than one active ingredient.  Read and follow all instructions on the symptoms may last for several weeks. Sometimes antibiotics are needed. Follow-up care is a key part of your treatment and safety. Be sure to make and go to all appointments, and call your doctor if you are having problems. It's also a good idea to know your test results and keep a list of the medicines you take. How can you care for yourself at home? · Take an over-the-counter pain medicine, such as acetaminophen (Tylenol), ibuprofen (Advil, Motrin), or naproxen (Aleve). Read and follow all instructions on the label. · If the doctor prescribed antibiotics, take them as directed. Do not stop taking them just because you feel better. You need to take the full course of antibiotics. · Be careful when taking over-the-counter cold or flu medicines and Tylenol at the same time. Many of these medicines have acetaminophen, which is Tylenol. Read the labels to make sure that you are not taking more than the recommended dose. Too much acetaminophen (Tylenol) can be harmful. · Breathe warm, moist air from a steamy shower, a hot bath, or a sink filled with hot water. Avoid cold, dry air. Using a humidifier in your home may help. Follow the directions for cleaning the machine. · Use saline (saltwater) nasal washes to help keep your nasal passages open and wash out mucus and bacteria. You can buy saline nose drops at a grocery store or drugstore. Or you can make your own at home by adding 1 teaspoon of salt and 1 teaspoon of baking soda to 2 cups of distilled water. If you make your own, fill a bulb syringe with the solution, insert the tip into your nostril, and squeeze gently. Nisa Ganesh your nose. · Put a hot, wet towel or a warm gel pack on your face 3 or 4 times a day for 5 to 10 minutes each time. · Try a decongestant nasal spray like oxymetazoline (Afrin). Do not use it for more than 3 days in a row. Using it for more than 3 days can make your congestion worse. When should you call for help?   Call your doctor now or

## 2018-12-13 NOTE — PROGRESS NOTES
EC tablet Take 1 tablet by mouth daily 90 tablet 3    fluticasone (FLONASE ALLERGY RELIEF) 50 MCG/ACT nasal spray 1 spray by Nasal route daily 1 Bottle 3    GARLIC PO Take by mouth daily       Omega-3 Fatty Acids (FISH OIL) 1200 MG CPDR Take by mouth 3 times daily       Cinnamon Bark POWD by Does not apply route 2 times daily        Current Facility-Administered Medications   Medication Dose Route Frequency Provider Last Rate Last Dose    ALLERGEN EXTRACT 0.5 mL  0.5 mL Subcutaneous Once Mi Yoder,         ALLERGEN EXTRACT 0.5 mL  0.5 mL Subcutaneous Once Baby Most, MD        ALLERGEN EXTRACT 0.5 mL  0.5 mL Subcutaneous Once Baby Most, MD        ALLERGEN EXTRACT 0.5 mL  0.5 mL Subcutaneous Once Baby Most, MD         Allergies   Allergen Reactions    Pneumococcal Vaccines Hives     fever    Statins      Muscle aches, knee pain    Tetracyclines & Related        Subjective:      Review of Systems   Constitutional: Positive for activity change, fatigue and fever. HENT: Positive for postnasal drip, rhinorrhea and sore throat. Negative for ear pain and sinus pressure. Eyes: Negative. Respiratory: Positive for cough and wheezing. Cardiovascular: Negative. Gastrointestinal: Negative. Endocrine: Negative. Genitourinary: Negative. Musculoskeletal: Positive for myalgias. Skin: Negative. Allergic/Immunologic: Positive for environmental allergies. Neurological: Negative. Hematological: Negative. Psychiatric/Behavioral: Negative. Objective:     Physical Exam   Constitutional: He is oriented to person, place, and time. He appears well-developed and well-nourished. HENT:   Head: Normocephalic. Right Ear: External ear normal.   Left Ear: External ear normal.   Nose: Nose normal.   Mouth/Throat: Mucous membranes are pale. Oropharyngeal exudate and posterior oropharyngeal erythema present. Eyes: Pupils are equal, round, and reactive to light.

## 2018-12-24 ENCOUNTER — OFFICE VISIT (OUTPATIENT)
Dept: FAMILY MEDICINE CLINIC | Age: 58
End: 2018-12-24
Payer: COMMERCIAL

## 2018-12-24 VITALS
HEART RATE: 96 BPM | DIASTOLIC BLOOD PRESSURE: 86 MMHG | TEMPERATURE: 99 F | WEIGHT: 248 LBS | SYSTOLIC BLOOD PRESSURE: 136 MMHG | OXYGEN SATURATION: 96 % | BODY MASS INDEX: 34.59 KG/M2

## 2018-12-24 DIAGNOSIS — J21.8 ACUTE BRONCHIOLITIS DUE TO OTHER SPECIFIED ORGANISMS: Primary | ICD-10-CM

## 2018-12-24 PROCEDURE — 99213 OFFICE O/P EST LOW 20 MIN: CPT | Performed by: FAMILY MEDICINE

## 2018-12-24 RX ORDER — CEPHALEXIN 500 MG/1
500 CAPSULE ORAL 3 TIMES DAILY
Qty: 21 CAPSULE | Refills: 0 | Status: SHIPPED | OUTPATIENT
Start: 2018-12-24 | End: 2018-12-31

## 2018-12-24 ASSESSMENT — ENCOUNTER SYMPTOMS
SORE THROAT: 0
FACIAL SWELLING: 0
TROUBLE SWALLOWING: 0
DIARRHEA: 0
COUGH: 1
EYE DISCHARGE: 0
VOMITING: 0
SINUS PAIN: 0
WHEEZING: 1
EYE REDNESS: 0

## 2018-12-24 NOTE — PROGRESS NOTES
HPI Notes    Name: Wanda Munoz  : 1960        Chief Complaint:     Chief Complaint   Patient presents with    URI     Pt seen at walk in on 18 for c/o cough, Influenza A&B negative. Pt given Amox 875 mg take BID x 10 days. Pt finished taking on 18 Pt states he is bring up green sputum and wheezing. History of Present Illness:     Wanda Munoz is a 66 y.o.  male who presents with URI (Pt seen at walk in on 18 for c/o cough, Influenza A&B negative. Pt given Amox 875 mg take BID x 10 days. Pt finished taking on 18 Pt states he is bring up green sputum and wheezing.)      URI    This is a recurrent problem. Episode onset: Pt had a cold about 2wks ago and went to walk in on Dec 13th. Pt started and completed 10d of the Augmentin and thought he was better. The problem has been gradually worsening (Cough and yesteday started bringing up Green phlegm. ). Associated symptoms include congestion, coughing and wheezing. Pertinent negatives include no chest pain, diarrhea, dysuria, ear pain, headaches, rash, sinus pain, sore throat or vomiting. Associated symptoms comments: Pt states his chest hurts some from coughing so much. Pt just feels like \"bubbling sound\" in the chest. . Treatments tried: OTC cough medications and mucinex. Pt also took all the amoxicillin and took cap mist. Pt also has tried [place] and home remedies.        Past Medical History:     Past Medical History:   Diagnosis Date    GERD (gastroesophageal reflux disease) 2013    HTN (hypertension) 2013    Hyperlipidemia 3/26/2014    Osteoarthritis     Dr Nicole Avila Pneumonia     Seasonal allergies     Toe fracture, left       Reviewed all health maintenance requirements and ordered appropriate tests  Health Maintenance Due   Topic Date Due    Hepatitis C screen  1960    HIV screen  1977    DTaP/Tdap/Td vaccine (1 - Tdap) 1981    Shingles Vaccine (1 of 2 - 2 Dose Series) 2012

## 2018-12-26 ENCOUNTER — HOSPITAL ENCOUNTER (OUTPATIENT)
Dept: GENERAL RADIOLOGY | Age: 58
Discharge: HOME | End: 2018-12-28
Payer: COMMERCIAL

## 2018-12-26 ENCOUNTER — HOSPITAL ENCOUNTER (OUTPATIENT)
Age: 58
Discharge: HOME | End: 2018-12-28
Payer: COMMERCIAL

## 2018-12-26 DIAGNOSIS — J21.8 ACUTE BRONCHIOLITIS DUE TO OTHER SPECIFIED ORGANISMS: ICD-10-CM

## 2018-12-26 DIAGNOSIS — J20.9 ACUTE BRONCHITIS, UNSPECIFIED ORGANISM: Primary | ICD-10-CM

## 2018-12-26 PROCEDURE — 71046 X-RAY EXAM CHEST 2 VIEWS: CPT

## 2018-12-26 RX ORDER — ALBUTEROL SULFATE 90 UG/1
2 AEROSOL, METERED RESPIRATORY (INHALATION) EVERY 4 HOURS PRN
Qty: 1 INHALER | Refills: 0 | Status: SHIPPED | OUTPATIENT
Start: 2018-12-26 | End: 2020-09-11 | Stop reason: SDUPTHER

## 2019-01-18 ENCOUNTER — OFFICE VISIT (OUTPATIENT)
Dept: FAMILY MEDICINE CLINIC | Age: 59
End: 2019-01-18
Payer: COMMERCIAL

## 2019-01-18 VITALS
TEMPERATURE: 98.1 F | BODY MASS INDEX: 35.23 KG/M2 | WEIGHT: 252.6 LBS | SYSTOLIC BLOOD PRESSURE: 132 MMHG | HEART RATE: 96 BPM | OXYGEN SATURATION: 97 % | DIASTOLIC BLOOD PRESSURE: 84 MMHG

## 2019-01-18 DIAGNOSIS — J01.40 ACUTE NON-RECURRENT PANSINUSITIS: Primary | ICD-10-CM

## 2019-01-18 PROCEDURE — 99213 OFFICE O/P EST LOW 20 MIN: CPT | Performed by: NURSE PRACTITIONER

## 2019-01-18 RX ORDER — AMOXICILLIN AND CLAVULANATE POTASSIUM 875; 125 MG/1; MG/1
1 TABLET, FILM COATED ORAL 2 TIMES DAILY
Qty: 20 TABLET | Refills: 0 | Status: SHIPPED | OUTPATIENT
Start: 2019-01-18 | End: 2019-01-28

## 2019-01-18 RX ORDER — GUAIFENESIN 600 MG/1
1200 TABLET, EXTENDED RELEASE ORAL 2 TIMES DAILY
Qty: 40 TABLET | Refills: 0 | Status: SHIPPED | OUTPATIENT
Start: 2019-01-18 | End: 2020-09-11 | Stop reason: ALTCHOICE

## 2019-01-18 ASSESSMENT — ENCOUNTER SYMPTOMS
VOMITING: 0
SHORTNESS OF BREATH: 0
COUGH: 1
DIARRHEA: 0
NAUSEA: 0

## 2019-02-06 ENCOUNTER — OFFICE VISIT (OUTPATIENT)
Dept: FAMILY MEDICINE CLINIC | Age: 59
End: 2019-02-06
Payer: COMMERCIAL

## 2019-02-06 VITALS
BODY MASS INDEX: 35.14 KG/M2 | HEART RATE: 111 BPM | OXYGEN SATURATION: 94 % | SYSTOLIC BLOOD PRESSURE: 130 MMHG | HEIGHT: 71 IN | DIASTOLIC BLOOD PRESSURE: 84 MMHG | WEIGHT: 251 LBS

## 2019-02-06 DIAGNOSIS — J01.41 ACUTE RECURRENT PANSINUSITIS: Primary | ICD-10-CM

## 2019-02-06 DIAGNOSIS — J30.89 ENVIRONMENTAL AND SEASONAL ALLERGIES: ICD-10-CM

## 2019-02-06 PROCEDURE — 99213 OFFICE O/P EST LOW 20 MIN: CPT | Performed by: FAMILY MEDICINE

## 2019-02-06 RX ORDER — DOXYCYCLINE HYCLATE 100 MG/1
100 CAPSULE ORAL 2 TIMES DAILY
Qty: 20 CAPSULE | Refills: 0 | Status: SHIPPED | OUTPATIENT
Start: 2019-02-06 | End: 2019-02-16

## 2019-02-06 RX ORDER — MONTELUKAST SODIUM 10 MG/1
10 TABLET ORAL NIGHTLY
Qty: 30 TABLET | Refills: 1 | Status: SHIPPED | OUTPATIENT
Start: 2019-02-06 | End: 2022-09-09

## 2019-02-06 ASSESSMENT — PATIENT HEALTH QUESTIONNAIRE - PHQ9
1. LITTLE INTEREST OR PLEASURE IN DOING THINGS: 0
SUM OF ALL RESPONSES TO PHQ QUESTIONS 1-9: 0
SUM OF ALL RESPONSES TO PHQ9 QUESTIONS 1 & 2: 0
2. FEELING DOWN, DEPRESSED OR HOPELESS: 0
SUM OF ALL RESPONSES TO PHQ QUESTIONS 1-9: 0

## 2019-02-06 ASSESSMENT — ENCOUNTER SYMPTOMS
EYE DISCHARGE: 0
NAUSEA: 0
WHEEZING: 0
COUGH: 1
EYE REDNESS: 0
DIARRHEA: 0
HEMOPTYSIS: 0
VOMITING: 0
SHORTNESS OF BREATH: 0
SINUS COMPLAINT: 1
RHINORRHEA: 1
SORE THROAT: 0

## 2019-03-06 ENCOUNTER — OFFICE VISIT (OUTPATIENT)
Dept: FAMILY MEDICINE CLINIC | Age: 59
End: 2019-03-06
Payer: COMMERCIAL

## 2019-03-06 VITALS
HEIGHT: 71 IN | BODY MASS INDEX: 35.42 KG/M2 | DIASTOLIC BLOOD PRESSURE: 88 MMHG | HEART RATE: 85 BPM | OXYGEN SATURATION: 96 % | WEIGHT: 253 LBS | SYSTOLIC BLOOD PRESSURE: 132 MMHG

## 2019-03-06 DIAGNOSIS — J32.4 CHRONIC PANSINUSITIS: ICD-10-CM

## 2019-03-06 DIAGNOSIS — I10 ESSENTIAL HYPERTENSION: Primary | ICD-10-CM

## 2019-03-06 PROCEDURE — 99213 OFFICE O/P EST LOW 20 MIN: CPT | Performed by: FAMILY MEDICINE

## 2019-03-06 RX ORDER — ASPIRIN 81 MG/1
81 TABLET ORAL DAILY
Qty: 90 TABLET | Refills: 3 | Status: SHIPPED | OUTPATIENT
Start: 2019-03-06 | End: 2019-09-04 | Stop reason: SDUPTHER

## 2019-03-06 RX ORDER — LOSARTAN POTASSIUM 100 MG/1
100 TABLET ORAL DAILY
Qty: 90 TABLET | Refills: 1 | Status: SHIPPED | OUTPATIENT
Start: 2019-03-06 | End: 2019-09-04 | Stop reason: SDUPTHER

## 2019-03-06 RX ORDER — HYDROCHLOROTHIAZIDE 25 MG/1
25 TABLET ORAL DAILY
Qty: 90 TABLET | Refills: 1 | Status: SHIPPED | OUTPATIENT
Start: 2019-03-06 | End: 2019-09-04 | Stop reason: SDUPTHER

## 2019-03-06 ASSESSMENT — ENCOUNTER SYMPTOMS
HOARSE VOICE: 1
SINUS PRESSURE: 0
DIARRHEA: 0
EYE REDNESS: 0
SHORTNESS OF BREATH: 0
SINUS COMPLAINT: 1
COUGH: 1
VOMITING: 0
TROUBLE SWALLOWING: 0
SORE THROAT: 0
EYE DISCHARGE: 0

## 2019-04-20 ENCOUNTER — OFFICE VISIT (OUTPATIENT)
Dept: PRIMARY CARE CLINIC | Age: 59
End: 2019-04-20
Payer: COMMERCIAL

## 2019-04-20 VITALS
OXYGEN SATURATION: 94 % | BODY MASS INDEX: 35.66 KG/M2 | HEART RATE: 78 BPM | WEIGHT: 254.7 LBS | TEMPERATURE: 98.6 F | SYSTOLIC BLOOD PRESSURE: 125 MMHG | DIASTOLIC BLOOD PRESSURE: 75 MMHG | HEIGHT: 71 IN

## 2019-04-20 DIAGNOSIS — B02.9 HERPES ZOSTER WITHOUT COMPLICATION: Primary | ICD-10-CM

## 2019-04-20 PROCEDURE — 99213 OFFICE O/P EST LOW 20 MIN: CPT | Performed by: NURSE PRACTITIONER

## 2019-04-20 RX ORDER — VALACYCLOVIR HYDROCHLORIDE 1 G/1
1000 TABLET, FILM COATED ORAL 3 TIMES DAILY
Qty: 21 TABLET | Refills: 0 | Status: SHIPPED | OUTPATIENT
Start: 2019-04-20 | End: 2019-05-16 | Stop reason: SDUPTHER

## 2019-04-20 ASSESSMENT — ENCOUNTER SYMPTOMS
COUGH: 0
VOMITING: 0
RHINORRHEA: 1
DIARRHEA: 0
SHORTNESS OF BREATH: 0
NAUSEA: 0
SORE THROAT: 0
WHEEZING: 0

## 2019-04-20 NOTE — PATIENT INSTRUCTIONS
Patient Education        Shingles: Care Instructions  Your Care Instructions    Shingles (herpes zoster) causes pain and a blistered rash. The rash can appear anywhere on the body but will be on only one side of the body, the left or right. It will be in a band, a strip, or a small area. The pain can be very severe. Shingles can also cause tingling or itching in the area of the rash. The blisters scab over after a few days and heal in 2 to 4 weeks. Medicines can help you feel better and may help prevent more serious problems caused by shingles. Shingles is caused by the same virus that causes chickenpox. When you have chickenpox, the virus gets into your nerve roots and stays there (becomes dormant) long after you get over the chickenpox. If the virus becomes active again, it can cause shingles. Follow-up care is a key part of your treatment and safety. Be sure to make and go to all appointments, and call your doctor if you are having problems. It's also a good idea to know your test results and keep a list of the medicines you take. How can you care for yourself at home? · Be safe with medicines. Take your medicines exactly as prescribed. Call your doctor if you think you are having a problem with your medicine. Antiviral medicine helps you get better faster. · Try not to scratch or pick at the blisters. They will crust over and fall off on their own if you leave them alone. · Put cool, wet cloths on the area to relieve pain and itching. You can also use calamine lotion. Try not to use so much lotion that it cakes and is hard to get off. · Put cornstarch or baking soda on the sores to help dry them out so they heal faster. · Do not use thick ointment, such as petroleum jelly, on the sores. This will keep them from drying and healing. · To help remove loose crusts, soak them in tap water. This can help decrease oozing, and dry and soothe the skin.   · Take an over-the-counter pain medicine, such as your medicine label and package. Tell each of your healthcare providers about all your medical conditions, allergies, and all medicines you use. What is valacyclovir? Valacyclovir is an antiviral drug. It slows the growth and spread of the herpes virus to help the body fight the infection. Valacyclovir is used to treat infections caused by herpes viruses, including genital herpes, cold sores, and shingles (herpes zoster) in adults. Valacyclovir is used to treat cold sores in children who are at least 15years old, or chickenpox in children who are at least 3years old. Valacyclovir will not cure herpes and will not prevent you from spreading the virus to other people. However, this medicine can lessen the symptoms of an infection. Valacyclovir may also be used for purposes not listed in this medication guide. What should I discuss with my healthcare provider before taking valacyclovir? You should not use this medicine if you are allergic to valacyclovir or acyclovir (Zovirax). To make sure valacyclovir is safe for you, tell your doctor if you have:  · kidney disease (or if you are on dialysis);  · HIV/AIDS, or other conditions that can weaken the immune system; or  · a history of kidney transplant or bone marrow transplant. It is not known whether this medicine will harm an unborn baby. However, herpes virus can be passed from an infected mother to her baby during childbirth. If you have genital herpes, it is very important to prevent herpes lesions during your pregnancy, so that you do not have a genital lesion when your baby is born. Valacyclovir can pass into breast milk and may harm a nursing baby. Tell your doctor if you are breast-feeding a baby. Do not give this medicine to a child without medical advice. How should I take valacyclovir? Follow all directions on your prescription label. Do not take this medicine in larger or smaller amounts or for longer than recommended.   Start taking valacyclovir as soon as possible after the first appearance of symptoms (such as tingling, burning, blisters). This medicine might not be as effective if you first start taking it 1 or 2 days after the start of your symptoms. Some herpes infections need to be treated for longer than others. Use this medicine for the full prescribed length of time. Your symptoms may improve before the infection is completely cleared. Skipping doses may increase the risk of your virus becoming resistant to antiviral medicine. You may take valacyclovir with or without food. Drink plenty of water while you are taking valacyclovir to keep your kidneys working properly. Lesions caused by herpes viruses should be kept as clean and dry as possible. Wearing loose clothing may help to prevent irritation of the lesions. Store valacyclovir tablets at room temperature away from moisture and heat. What happens if I miss a dose? Take the missed dose as soon as you remember. Skip the missed dose if it is almost time for your next scheduled dose. Do not take extra medicine to make up the missed dose. What happens if I overdose? Seek emergency medical attention or call the Poison Help line at 1-878.208.7637. What should I avoid while taking valacyclovir? Taking this medicine will not prevent you from passing genital herpes to other people. Herpes infections are contagious and you can infect other people even while you are taking with valacyclovir. Avoid sexual intercourse or use a latex condom to help keep you from spreading the virus to others. Avoid letting infected areas come into contact with other people. Avoid touching an infected area and then touching your eyes. Wash your hands frequently to prevent the spread of infection. Do not share this medicine with another person, even if they have the same symptoms you have. What are the possible side effects of valacyclovir?   Get emergency medical help if you have signs of an allergic reaction: hives; difficult breathing; swelling of your face, lips, tongue, or throat. Call your doctor at once if you have:  · confusion, aggression, or you feel shaky or unsteady;  · hallucinations (seeing or hearing things that are not real);  · problems with speech;  · a seizure (convulsions); or  · kidney problems --little or no urination, painful or difficult urination, swelling in your feet or ankles, feeling tired or short of breath. Stop taking valacyclovir and call your doctor right away if you have any of the following signs of a serious side effect that can harm red blood cells:  · fever, pale skin;  · unusual bleeding (nosebleeds, bleeding gums);  · red or pink urine, little or no urination;  · red spots on the skin (not related to herpes or chickenpox);  · feeling weak or tired;  · stomach pain, bloody diarrhea, vomiting; or  · swelling in your face, hands, or feet. Side effects may be more likely in adults who are 72 or older. Common side effects may include:  · nausea, stomach pain;  · headache;  · rash; or  · tired feeling. This is not a complete list of side effects and others may occur. Call your doctor for medical advice about side effects. You may report side effects to FDA at 1-755-FDA-5580. What other drugs will affect valacyclovir? Valacyclovir can harm your kidneys. This effect is increased when you also use certain other medicines, including: antivirals, chemotherapy, injected antibiotics, medicine for bowel disorders, medicine to prevent organ transplant rejection, injectable osteoporosis medication, and some pain or arthritis medicines (including aspirin, Tylenol, Advil, and Aleve). Other drugs may interact with valacyclovir, including prescription and over-the-counter medicines, vitamins, and herbal products. Tell each of your health care providers about all medicines you use now and any medicine you start or stop using. Where can I get more information?   Your pharmacist can provide more information about valacyclovir. Remember, keep this and all other medicines out of the reach of children, never share your medicines with others, and use this medication only for the indication prescribed. Every effort has been made to ensure that the information provided by [address] is accurate, up-to-date, and complete, but no guarantee is made to that effect. Drug information contained herein may be time sensitive. [facility] information has been compiled for use by healthcare practitioners and consumers in the [place] and therefore [facility] does not warrant that uses outside of the [place] are appropriate, unless specifically indicated otherwise. [facility]'s drug information does not endorse drugs, diagnose patients or recommend therapy. [facility]'s drug information is an informational resource designed to assist licensed healthcare practitioners in caring for their patients and/or to serve consumers viewing this service as a supplement to, and not a substitute for, the expertise, skill, knowledge and judgment of healthcare practitioners. The absence of a warning for a given drug or drug combination in no way should be construed to indicate that the drug or drug combination is safe, effective or appropriate for any given patient. [facility] does not assume any responsibility for any aspect of healthcare administered with the aid of information [facility] provides. The information contained herein is not intended to cover all possible uses, directions, precautions, warnings, drug interactions, allergic reactions, or adverse effects. If you have questions about the drugs you are taking, check with your doctor, nurse or pharmacist.  Copyright 7791-3401 [address]. Version: 10.01. Revision date: 11/19/2015. Care instructions adapted under license by [facility] ([facility]).  If you have questions about a medical condition or this instruction, always ask your healthcare professional. provider if you:  · Have any severe, life-threatening allergies. A person who has ever had a life-threatening allergic reaction after a dose of live shingles vaccine, or has a severe allergy to any component of this vaccine, may be advised not to be vaccinated. Ask your health care provider if you want information about vaccine components. · Are pregnant, or think you might be pregnant. Pregnant women should wait to get live shingles vaccine until they are no longer pregnant. Women should avoid getting pregnant for at least 1 month after getting shingles vaccine. · Have a weakened immune system due to disease (such as cancer or AIDS) or medical treatments (such as radiation, immunotherapy, high-dose steroids, or chemotherapy). · Are not feeling well. If you have a mild illness, such as a cold, you can probably get the vaccine today. If you are moderately or severely ill, you should probably wait until you recover. Your doctor can advise you. Risks of a vaccine reaction  With any medicine, including vaccines, there is a chance of reactions. After live shingles vaccination, a person might experience:  · Redness, soreness, swelling, or itching at the site of the injection  · Headache  These events are usually mild and go away on their own. Rarely, live shingles vaccine can cause rash or shingles. Other things that could happen after this vaccine:  · People sometimes faint after medical procedures, including vaccination. Sitting or lying down for about 15 minutes can help prevent fainting and injuries caused by a fall. Tell your provider if you feel dizzy or have vision changes or ringing in the ears. · Some people get shoulder pain that can be more severe and longer-lasting than routine soreness that can follow injections. This happens very rarely. · Any medication can cause a severe allergic reaction.  Such reactions to a vaccine are estimated at about 1 in a million doses, and would happen within a few minutes to a few hours after the vaccination. As with any medicine, there is a very remote chance of a vaccine causing a serious injury or death. The safety of vaccines is always being monitored. For more information, visit: www.cdc.gov/vaccinesafety/  What if there is a serious problem? What should I look for? · Look for anything that concerns you, such as signs of a severe allergic reaction, very high fever, or unusual behavior. Signs of a severe allergic reaction can include hives, swelling of the face and throat, difficulty breathing, a fast heartbeat, dizziness, and weakness. These would usually start a few minutes to a few hours after the vaccination. Afterward, the reaction should be reported to the Vaccine Adverse Event Reporting System (VAERS). Your doctor should file this report, or you can do it yourself through the VAERS website at www.vaers. [facility].gov, or by calling 6-751.196.6103. VAERS does not give medical advice. .  How can I learn more? · Ask your health care provider. He or she can give you the vaccine package insert or suggest other sources of information. · Call your local or state health department. · Contact the Centers for Disease Control and Prevention (CDC):  ? Call 3-777.899.4297 (1-800-CDC-INFO) or  ? Visit CDC's website at www.cdc.gov/vaccines  Vaccine Information Statement  Live Zoster Vaccine  2/12/2018  [facility] for Disease Control and Prevention  Many Vaccine Information Statements are available in [place] and other languages. See www.immunize.org/vis  Hojas de Información Sobre Vacunas están disponibles en Español y en muchos otros idiomas. Visite [email or web address]   Care instructions adapted under license by [facility] ([facility]). If you have questions about a medical condition or this instruction, always ask your healthcare professional. [address] any warranty or liability for your use of this information. · Valacyclovir 1000 mg, 3 times per day for 7 days until all doses are completed. · Practice meticulous handwashing to prevent spread of infection. · Keep rash clean and dry, avoid irritation, do not apply creams or moisturizers. · Wear loose fitting clothing. · Avoid swimming in pools, hot tubs, saunas, lakes or soaking in water. · Encouraged to increase fluids and rest   · Tylenol/Ibuprofen OTC PRN for pain, discomfort or fever as directed on package. · Avoid contact with individuals who have not had chickenpox or who have not had chickenpox vaccine until blisters/rash are healed, especially pregnant women. · Patient instructions given for shingles, shingles vaccine and valacyclovir.    · Follow up with PCP immediately if symptoms worsen or signs of secondary infection develop, such as fever, purulent drainage and/or increasing pain. · To ER or call 911 if any difficulty breathing, shortness of breath, inability to swallow, hives, rash, facial/tongue swelling or temp greater than 103 degrees.

## 2019-04-20 NOTE — PROGRESS NOTES
valACYclovir (VALTREX) 1 g tablet Take 1 tablet by mouth 3 times daily for 7 days 21 tablet 0    hydrochlorothiazide (HYDRODIURIL) 25 MG tablet Take 1 tablet by mouth daily 90 tablet 1    losartan (COZAAR) 100 MG tablet Take 1 tablet by mouth daily 90 tablet 1    aspirin EC 81 MG EC tablet Take 1 tablet by mouth daily 90 tablet 3    Loratadine (CLARITIN PO) Take by mouth      montelukast (SINGULAIR) 10 MG tablet Take 1 tablet by mouth nightly 30 tablet 1    fluticasone (FLONASE ALLERGY RELIEF) 50 MCG/ACT nasal spray 1 spray by Nasal route daily 1 Bottle 3    GARLIC PO Take by mouth daily       Omega-3 Fatty Acids (FISH OIL) 1200 MG CPDR Take by mouth 3 times daily       Cinnamon Bark POWD by Does not apply route 2 times daily       guaiFENesin (MUCINEX) 600 MG extended release tablet Take 2 tablets by mouth 2 times daily 40 tablet 0    albuterol sulfate HFA (PROVENTIL HFA) 108 (90 Base) MCG/ACT inhaler Inhale 2 puffs into the lungs every 4 hours as needed for Wheezing (cough) 1 Inhaler 0     Current Facility-Administered Medications   Medication Dose Route Frequency Provider Last Rate Last Dose    ALLERGEN EXTRACT 0.5 mL  0.5 mL Subcutaneous Once Mi Yoder DO        ALLERGEN EXTRACT 0.5 mL  0.5 mL Subcutaneous Once Radha Pierre MD        ALLERGEN EXTRACT 0.5 mL  0.5 mL Subcutaneous Once Radha Pierre MD        ALLERGEN EXTRACT 0.5 mL  0.5 mL Subcutaneous Once Radha Pierre MD            Allergies   Allergen Reactions    Pneumococcal Vaccines Hives     fever    Statins      Muscle aches, knee pain    Tetracyclines & Related        Subjective:     Review of Systems   Constitutional: Positive for fatigue and fever. Negative for appetite change, chills and diaphoresis. HENT: Positive for rhinorrhea. Negative for congestion, ear pain and sore throat. Respiratory: Negative for cough, shortness of breath and wheezing. Gastrointestinal: Negative for diarrhea, nausea and vomiting. Skin: Positive for rash (to left abdomen that is painful). Negative for wound. Neurological: Positive for headaches. Negative for dizziness and light-headedness (little bit of dull headache). Objective:      Physical Exam   Constitutional: He is oriented to person, place, and time. Vital signs are normal. He appears well-developed and well-nourished. He is cooperative. He does not appear ill. No distress. HENT:   Head: Normocephalic and atraumatic. Right Ear: External ear normal.   Left Ear: External ear normal.   Eyes: Conjunctivae are normal.   Neck: Neck supple. Cardiovascular: Normal rate, regular rhythm, S1 normal, S2 normal and intact distal pulses. Exam reveals no gallop and no friction rub. No murmur heard. Pulmonary/Chest: Effort normal and breath sounds normal. No accessory muscle usage. No respiratory distress. He has no decreased breath sounds. He has no wheezes. He has no rhonchi. He has no rales. Musculoskeletal: Normal range of motion. Lymphadenopathy:     He has no cervical adenopathy. Right cervical: No superficial cervical and no posterior cervical adenopathy present. Left cervical: No superficial cervical and no posterior cervical adenopathy present. He has no axillary adenopathy. Right axillary: No pectoral and no lateral adenopathy present. Left axillary: No pectoral and no lateral adenopathy present. Neurological: He is alert and oriented to person, place, and time. Skin: Skin is warm and dry. Rash noted. No abrasion, no ecchymosis, no laceration, no lesion and no purpura noted. Rash is papular and vesicular. Rash is not macular, not nodular, not pustular and not urticarial. He is not diaphoretic. No erythema. Scattered erythematous, painful, papulovesicular rash with few scattered scabbed areas to left side of mid abdomen following T 8 - T 9 dermatome. No drainage, bleeding, seeping or red streaking.   No focal warmth, edema or tenderness. Psychiatric: He has a normal mood and affect. His behavior is normal.   Nursing note and vitals reviewed. /75 (Site: Right Upper Arm, Position: Sitting, Cuff Size: Large Adult)   Pulse 78   Temp 98.6 °F (37 °C) (Oral)   Ht 5' 11\" (1.803 m)   Wt 254 lb 11.2 oz (115.5 kg)   SpO2 94%   BMI 35.52 kg/m²           Assessment:      Diagnosis Orders   1. Herpes zoster without complication  valACYclovir (VALTREX) 1 g tablet       Plan:      Return if symptoms worsen or fail to improve, for Resume all previous medications as directed. Orders Placed This Encounter   Medications    valACYclovir (VALTREX) 1 g tablet     Sig: Take 1 tablet by mouth 3 times daily for 7 days     Dispense:  21 tablet     Refill:  0      · Valacyclovir 1000 mg, 3 times per day for 7 days until all doses are completed. · Practice meticulous handwashing to prevent spread of infection. · Keep rash clean and dry, avoid irritation, do not apply creams or moisturizers. · Wear loose fitting clothing. · Avoid swimming in pools, hot tubs, saunas, lakes or soaking in water. · Encouraged to increase fluids and rest   · Tylenol/Ibuprofen OTC PRN for pain, discomfort or fever as directed on package. · Avoid contact with individuals who have not had chickenpox or who have not had chickenpox vaccine until blisters/rash are healed, especially pregnant women. · Patient instructions given for shingles, shingles vaccine and valacyclovir.    · Follow up with PCP immediately if symptoms worsen or signs of secondary infection develop, such as fever, purulent drainage and/or increasing pain. · To ER or call 911 if any difficulty breathing, shortness of breath, inability to swallow, hives, rash, facial/tongue swelling or temp greater than 103 degrees. Kimberly Hernandez received counseling on the following healthy behaviors: medication adherence. Patient given educational materials - see patient instructions.   Discussed

## 2019-05-16 ENCOUNTER — OFFICE VISIT (OUTPATIENT)
Dept: FAMILY MEDICINE CLINIC | Age: 59
End: 2019-05-16
Payer: COMMERCIAL

## 2019-05-16 VITALS
DIASTOLIC BLOOD PRESSURE: 80 MMHG | SYSTOLIC BLOOD PRESSURE: 124 MMHG | HEART RATE: 88 BPM | BODY MASS INDEX: 35.29 KG/M2 | OXYGEN SATURATION: 98 % | WEIGHT: 253 LBS | TEMPERATURE: 98.1 F

## 2019-05-16 DIAGNOSIS — B02.9 HERPES ZOSTER WITHOUT COMPLICATION: ICD-10-CM

## 2019-05-16 PROCEDURE — 99213 OFFICE O/P EST LOW 20 MIN: CPT | Performed by: NURSE PRACTITIONER

## 2019-05-16 RX ORDER — VALACYCLOVIR HYDROCHLORIDE 1 G/1
1000 TABLET, FILM COATED ORAL 3 TIMES DAILY
Qty: 21 TABLET | Refills: 0 | Status: SHIPPED | OUTPATIENT
Start: 2019-05-16 | End: 2019-05-23

## 2019-05-16 ASSESSMENT — ENCOUNTER SYMPTOMS
COUGH: 0
NAUSEA: 0
DIARRHEA: 0
SHORTNESS OF BREATH: 0
VOMITING: 0

## 2019-05-16 NOTE — PROGRESS NOTES
HPI Notes    Name: Leia Clancy  : 1960         Chief Complaint:     Chief Complaint   Patient presents with    Rash     Patient here today for rash on left arm, that started this morning. He was seen at walk in on 19 and dx with herpes zoster on left abdomen and given valtrex 1 gm tid x 7 days. He said that is gone. He is concerned that rash could be shingles again. History of Present Illness:        Rash   This is a new problem. The current episode started yesterday. The problem has been gradually worsening since onset. The affected locations include the left wrist, left hand and left fingers. The rash is characterized by burning, blistering, itchiness and pain. He was exposed to nothing. Pertinent negatives include no cough, diarrhea, fever, shortness of breath or vomiting. Past treatments include nothing. Pt has shingles outbreak to left abd on 19  Past Medical History:     Past Medical History:   Diagnosis Date    GERD (gastroesophageal reflux disease) 2013    HTN (hypertension) 2013    Hyperlipidemia 3/26/2014    Osteoarthritis     Dr Bret Carrera Pneumonia     Seasonal allergies     Toe fracture, left       Reviewed all health maintenance requirements and ordered appropriate tests  Health Maintenance Due   Topic Date Due    Hepatitis C screen  1960    HIV screen  1977    DTaP/Tdap/Td vaccine (1 - Tdap) 1981    Shingles Vaccine (1 of 2) 2012    Potassium monitoring  2018    Creatinine monitoring  2018       Past Surgical History:     Past Surgical History:   Procedure Laterality Date    KNEE SURGERY Right         Medications:       Prior to Admission medications    Medication Sig Start Date End Date Taking?  Authorizing Provider   valACYclovir (VALTREX) 1 g tablet Take 1 tablet by mouth 3 times daily for 7 days 19 Yes Harrel Snellen, APRN - JOSE   hydrochlorothiazide (HYDRODIURIL) 25 MG tablet Take 1 tablet by mouth daily 3/6/19  Yes Cruz Garcia MD   losartan (COZAAR) 100 MG tablet Take 1 tablet by mouth daily 3/6/19  Yes Cruz Garcia MD   aspirin EC 81 MG EC tablet Take 1 tablet by mouth daily 3/6/19  Yes Cruz Garcia MD   Loratadine (CLARITIN PO) Take by mouth   Yes Historical Provider, MD   montelukast (SINGULAIR) 10 MG tablet Take 1 tablet by mouth nightly 2/6/19  Yes Cruz Garcia MD   guaiFENesin (MUCINEX) 600 MG extended release tablet Take 2 tablets by mouth 2 times daily 1/18/19  Yes Niranjan Leonard, APRN - CNP   albuterol sulfate HFA (PROVENTIL HFA) 108 (90 Base) MCG/ACT inhaler Inhale 2 puffs into the lungs every 4 hours as needed for Wheezing (cough) 12/26/18  Yes Cruz Garcia MD   fluticasone [facility] ALLERGY RELIEF) 50 MCG/ACT nasal spray 1 spray by Nasal route daily 3/9/18  Yes Homa Jennifer, DO   GARLIC PO Take by mouth daily    Yes Historical Provider, MD   Omega-3 Fatty Acids (FISH OIL) 1200 MG CPDR Take by mouth 3 times daily    Yes Historical Provider, MD Uvaldo TAMEZ by Does not apply route 2 times daily    Yes Historical Provider, MD        Allergies:       Pneumococcal vaccines; Statins; and Tetracyclines & related    Social History:     Tobacco:    reports that he has never smoked. He quit smokeless tobacco use about 11 years ago. His smokeless tobacco use included snuff. Alcohol:      reports that he drinks about 2.4 oz of alcohol per week. Drug Use:  reports that he does not use drugs. Family History:        Family History   Problem Relation Age of Onset    Arthritis Mother     Heart Attack Father        Review of Systems:         Review of Systems   Constitutional: Negative for chills and fever. Respiratory: Negative for cough and shortness of breath. Cardiovascular: Negative for chest pain and palpitations. Gastrointestinal: Negative for diarrhea, nausea and vomiting. Skin: Positive for rash.    Neurological: Negative for dizziness, seizures and headaches. Physical Exam:     Vitals:  /80   Pulse 88   Temp 98.1 °F (36.7 °C) (Oral)   Wt 253 lb (114.8 kg)   SpO2 98%   BMI 35.29 kg/m²       Physical Exam   Constitutional: Vital signs are normal. He appears well-developed and well-nourished. He is cooperative. Cardiovascular: Normal rate, regular rhythm, S1 normal and S2 normal.   Pulmonary/Chest: Effort normal and breath sounds normal. No respiratory distress. Musculoskeletal:        Arms:  Skin: Skin is warm, dry and intact. Nursing note and vitals reviewed. Data:     Lab Results   Component Value Date     08/18/2017    K 4.3 08/18/2017     08/18/2017    CO2 22 08/18/2017    BUN 13 08/18/2017    CREATININE 0.91 08/18/2017    GLUCOSE 99 08/18/2017    PROT 6.7 11/23/2016    LABALBU 4.3 11/23/2016    BILITOT 0.39 11/23/2016    ALKPHOS 69 11/23/2016    AST 17 08/18/2017    ALT 27 08/18/2017     Lab Results   Component Value Date    WBC 6.4 08/18/2017    RBC 5.36 08/18/2017    HGB 15.8 08/18/2017    HCT 47.7 08/18/2017    MCV 89.2 08/18/2017    MCH 29.5 08/18/2017    MCHC 33.1 08/18/2017    RDW 13.2 08/18/2017     08/18/2017    MPV NOT REPORTED 12/13/2016     Lab Results   Component Value Date    TSH 1.65 08/18/2017     Lab Results   Component Value Date    CHOL 229 08/18/2017    HDL 42 08/18/2017    PSA 2.32 08/18/2017    LABA1C 5.8 08/31/2018          Assessment & Plan        Diagnosis Orders   1. Herpes zoster without complication  valACYclovir (VALTREX) 1 g tablet     Will treat with valtrex. Pt educated about medication. Pt encouraged to get shingles vaccine after this outbreak is over. Avoid picking or scratching lesions. Patient verbalizes understanding and agreement with plan. All questions answered. If symptoms do not resolve or worsen, return to office.                  Completed Refills   Requested Prescriptions     Signed Prescriptions Disp Refills    valACYclovir (VALTREX) 1 g tablet 21 tablet 0     Sig: Take 1 tablet by mouth 3 times daily for 7 days     No follow-ups on file. Orders Placed This Encounter   Medications    valACYclovir (VALTREX) 1 g tablet     Sig: Take 1 tablet by mouth 3 times daily for 7 days     Dispense:  21 tablet     Refill:  0     No orders of the defined types were placed in this encounter. Patient Instructions     SURVEY:    You may be receiving a survey from [employer] regarding your visit today. Please complete the survey to enable us to provide the highest quality of care to you and your family. If you cannot score us a very good on any question, please call the office to discuss how we could have made your experience a very good one. Thank you. Electronically signed by TARA Prado CNP on 5/16/2019 at 4:45 PM           Completed Refills      Requested Prescriptions     Signed Prescriptions Disp Refills    valACYclovir (VALTREX) 1 g tablet 21 tablet 0     Sig: Take 1 tablet by mouth 3 times daily for 7 days         Liang Arreola received counseling on the following healthy behaviors: medication adherence  Reviewed prior labs and health maintenance. Continue current medications, diet and exercise. Discussed use, benefit, and side effects of prescribed medications. Barriers to medication compliance addressed. Patient given educational materials - see patient instructions. All patient questions answered. Patient voiced understanding.

## 2019-05-16 NOTE — PATIENT INSTRUCTIONS
SURVEY:    You may be receiving a survey from [employer] regarding your visit today. Please complete the survey to enable us to provide the highest quality of care to you and your family. If you cannot score us a very good on any question, please call the office to discuss how we could have made your experience a very good one. Thank you.

## 2019-09-04 ENCOUNTER — OFFICE VISIT (OUTPATIENT)
Dept: FAMILY MEDICINE CLINIC | Age: 59
End: 2019-09-04
Payer: COMMERCIAL

## 2019-09-04 VITALS
HEART RATE: 77 BPM | WEIGHT: 249 LBS | DIASTOLIC BLOOD PRESSURE: 78 MMHG | SYSTOLIC BLOOD PRESSURE: 120 MMHG | OXYGEN SATURATION: 96 % | BODY MASS INDEX: 34.86 KG/M2 | HEIGHT: 71 IN

## 2019-09-04 DIAGNOSIS — I10 ESSENTIAL HYPERTENSION: Primary | ICD-10-CM

## 2019-09-04 DIAGNOSIS — J30.9 CHRONIC ALLERGIC RHINITIS: ICD-10-CM

## 2019-09-04 PROCEDURE — 99213 OFFICE O/P EST LOW 20 MIN: CPT | Performed by: FAMILY MEDICINE

## 2019-09-04 RX ORDER — ASPIRIN 81 MG/1
81 TABLET ORAL DAILY
Qty: 90 TABLET | Refills: 3 | Status: SHIPPED | OUTPATIENT
Start: 2019-09-04 | End: 2020-09-11 | Stop reason: SDUPTHER

## 2019-09-04 RX ORDER — LOSARTAN POTASSIUM 100 MG/1
100 TABLET ORAL DAILY
Qty: 90 TABLET | Refills: 1 | Status: SHIPPED | OUTPATIENT
Start: 2019-09-04 | End: 2020-03-13 | Stop reason: SDUPTHER

## 2019-09-04 RX ORDER — HYDROCHLOROTHIAZIDE 25 MG/1
25 TABLET ORAL DAILY
Qty: 90 TABLET | Refills: 1 | Status: SHIPPED | OUTPATIENT
Start: 2019-09-04 | End: 2020-03-13 | Stop reason: SDUPTHER

## 2019-09-04 ASSESSMENT — ENCOUNTER SYMPTOMS
VOMITING: 0
NAUSEA: 0
BLURRED VISION: 0
EYE DISCHARGE: 0
DIARRHEA: 0
EYE REDNESS: 0
RHINORRHEA: 0
CONSTIPATION: 0
BLOOD IN STOOL: 0
ABDOMINAL PAIN: 0
SHORTNESS OF BREATH: 0
TROUBLE SWALLOWING: 0
COUGH: 0

## 2019-09-04 NOTE — PROGRESS NOTES
Start Date End Date Taking? Authorizing Provider   hydrochlorothiazide (HYDRODIURIL) 25 MG tablet Take 1 tablet by mouth daily 3/6/19  Yes Kelley Solis MD   losartan (COZAAR) 100 MG tablet Take 1 tablet by mouth daily 3/6/19  Yes Kelley Solis MD   aspirin EC 81 MG EC tablet Take 1 tablet by mouth daily 3/6/19  Yes Kelley Solis MD   montelukast (SINGULAIR) 10 MG tablet Take 1 tablet by mouth nightly 2/6/19  Yes Kelley Solis MD   GARLIC PO Take by mouth daily    Yes Historical Provider, MD   Omega-3 Fatty Acids (FISH OIL) 1200 MG CPDR Take by mouth 3 times daily    Yes Historical Provider, MD   Jacobo Mathis POWD by Does not apply route 2 times daily    Yes Historical Provider, MD   Loratadine (CLARITIN PO) Take by mouth    Historical Provider, MD   guaiFENesin (MUCINEX) 600 MG extended release tablet Take 2 tablets by mouth 2 times daily 1/18/19   Radha Eisenberg, APRN - CNP   albuterol sulfate HFA (PROVENTIL HFA) 108 (90 Base) MCG/ACT inhaler Inhale 2 puffs into the lungs every 4 hours as needed for Wheezing (cough) 12/26/18   Kelley Solis MD   fluticasone [facility] ALLERGY RELIEF) 50 MCG/ACT nasal spray 1 spray by Nasal route daily 3/9/18   Ava Jack DO        Allergies:       Pneumococcal vaccines; Statins; and Tetracyclines & related    Social History:     Tobacco:    reports that he has never smoked. He quit smokeless tobacco use about 11 years ago. His smokeless tobacco use included snuff. Alcohol:      reports that he drinks about 4.0 standard drinks of alcohol per week. Drug Use:  reports that he does not use drugs. Family History:     Family History   Problem Relation Age of Onset    Arthritis Mother     Heart Attack Father        Review of Systems:       Review of Systems   Constitutional: Negative for chills, fatigue, fever and malaise/fatigue. HENT: Negative for congestion, rhinorrhea and trouble swallowing.     Eyes: Negative for blurred vision, discharge, redness and visual disturbance. Respiratory: Negative for cough and shortness of breath. Cardiovascular: Negative for chest pain and palpitations. Gastrointestinal: Negative for abdominal pain, blood in stool, constipation, diarrhea, nausea and vomiting. Genitourinary: Negative for dysuria and hematuria. Musculoskeletal: Positive for arthralgias. Negative for joint swelling. Pt seeing Dr Theo Welch for his knee arthritis. Skin: Negative for pallor and rash. Neurological: Negative for dizziness, light-headedness and headaches. Psychiatric/Behavioral: Negative for confusion and sleep disturbance. Physical Exam:     Physical Exam   Constitutional: He appears well-developed and well-nourished. HENT:   Head: Normocephalic and atraumatic. Nose: Nose normal.   Mouth/Throat: Oropharynx is clear and moist. No oropharyngeal exudate. Eyes: Pupils are equal, round, and reactive to light. Conjunctivae are normal. Right eye exhibits no discharge. Left eye exhibits no discharge. Neck: Neck supple. No thyromegaly present. Cardiovascular: Normal rate and regular rhythm. No murmur heard. Pulmonary/Chest: Effort normal and breath sounds normal.   Abdominal: Soft. Bowel sounds are normal. He exhibits no distension. There is no tenderness. Musculoskeletal: He exhibits no edema. Lymphadenopathy:     He has no cervical adenopathy. Neurological: He is alert. Skin: No rash noted. No erythema. Psychiatric: He has a normal mood and affect. Vitals reviewed.       Vitals:  /78 (Site: Right Upper Arm, Position: Sitting, Cuff Size: Large Adult)   Pulse 77   Ht 5' 11\" (1.803 m)   Wt 249 lb (112.9 kg)   SpO2 96%   BMI 34.73 kg/m²       Data:     Lab Results   Component Value Date     08/18/2017    K 4.3 08/18/2017     08/18/2017    CO2 22 08/18/2017    BUN 13 08/18/2017    CREATININE 0.91 08/18/2017    GLUCOSE 99 08/18/2017    PROT 6.7 11/23/2016    LABALBU 4.3 11/23/2016    BILITOT

## 2019-11-22 ENCOUNTER — HOSPITAL ENCOUNTER (OUTPATIENT)
Dept: PHYSICAL THERAPY | Age: 59
Setting detail: THERAPIES SERIES
Discharge: HOME | End: 2019-11-22
Payer: COMMERCIAL

## 2019-11-22 PROCEDURE — 97161 PT EVAL LOW COMPLEX 20 MIN: CPT

## 2019-11-22 ASSESSMENT — PAIN SCALES - GENERAL: PAINLEVEL_OUTOF10: 6

## 2019-11-25 ENCOUNTER — HOSPITAL ENCOUNTER (OUTPATIENT)
Dept: PHYSICAL THERAPY | Age: 59
Setting detail: THERAPIES SERIES
Discharge: HOME | End: 2019-11-25
Payer: COMMERCIAL

## 2019-11-25 PROCEDURE — 97110 THERAPEUTIC EXERCISES: CPT

## 2019-11-25 ASSESSMENT — PAIN SCALES - GENERAL: PAINLEVEL_OUTOF10: 3

## 2019-11-27 ENCOUNTER — HOSPITAL ENCOUNTER (OUTPATIENT)
Dept: PHYSICAL THERAPY | Age: 59
Setting detail: THERAPIES SERIES
Discharge: HOME | End: 2019-11-27
Payer: COMMERCIAL

## 2019-11-27 PROCEDURE — 97110 THERAPEUTIC EXERCISES: CPT

## 2019-11-27 ASSESSMENT — PAIN SCALES - GENERAL: PAINLEVEL_OUTOF10: 3

## 2019-11-29 ENCOUNTER — HOSPITAL ENCOUNTER (OUTPATIENT)
Dept: PHYSICAL THERAPY | Age: 59
Setting detail: THERAPIES SERIES
Discharge: HOME | End: 2019-11-29
Payer: COMMERCIAL

## 2019-11-29 PROCEDURE — 97110 THERAPEUTIC EXERCISES: CPT

## 2019-11-29 ASSESSMENT — PAIN SCALES - GENERAL: PAINLEVEL_OUTOF10: 2

## 2019-12-02 ENCOUNTER — HOSPITAL ENCOUNTER (OUTPATIENT)
Dept: PHYSICAL THERAPY | Age: 59
Setting detail: THERAPIES SERIES
Discharge: HOME | End: 2019-12-02
Payer: COMMERCIAL

## 2019-12-02 ENCOUNTER — OFFICE VISIT (OUTPATIENT)
Dept: PRIMARY CARE CLINIC | Age: 59
End: 2019-12-02
Payer: COMMERCIAL

## 2019-12-02 VITALS
WEIGHT: 254.7 LBS | TEMPERATURE: 98.9 F | BODY MASS INDEX: 35.52 KG/M2 | OXYGEN SATURATION: 94 % | SYSTOLIC BLOOD PRESSURE: 127 MMHG | DIASTOLIC BLOOD PRESSURE: 86 MMHG | HEART RATE: 100 BPM

## 2019-12-02 DIAGNOSIS — J20.9 BRONCHITIS, ACUTE, WITH BRONCHOSPASM: Primary | ICD-10-CM

## 2019-12-02 PROCEDURE — 97110 THERAPEUTIC EXERCISES: CPT

## 2019-12-02 PROCEDURE — 99213 OFFICE O/P EST LOW 20 MIN: CPT | Performed by: NURSE PRACTITIONER

## 2019-12-02 RX ORDER — DOXYCYCLINE HYCLATE 100 MG
100 TABLET ORAL 2 TIMES DAILY
Qty: 20 TABLET | Refills: 0 | Status: SHIPPED | OUTPATIENT
Start: 2019-12-02 | End: 2019-12-12

## 2019-12-02 ASSESSMENT — ENCOUNTER SYMPTOMS
WHEEZING: 0
COUGH: 1
EYES NEGATIVE: 1
SHORTNESS OF BREATH: 0
GASTROINTESTINAL NEGATIVE: 1
RHINORRHEA: 1
SORE THROAT: 1
ALLERGIC/IMMUNOLOGIC NEGATIVE: 1
CHEST TIGHTNESS: 1

## 2019-12-04 ENCOUNTER — HOSPITAL ENCOUNTER (OUTPATIENT)
Dept: PHYSICAL THERAPY | Age: 59
Setting detail: THERAPIES SERIES
Discharge: HOME | End: 2019-12-04
Payer: COMMERCIAL

## 2019-12-04 PROCEDURE — 97110 THERAPEUTIC EXERCISES: CPT

## 2019-12-06 ENCOUNTER — HOSPITAL ENCOUNTER (OUTPATIENT)
Dept: PHYSICAL THERAPY | Age: 59
Setting detail: THERAPIES SERIES
Discharge: HOME | End: 2019-12-06
Payer: COMMERCIAL

## 2019-12-06 PROCEDURE — 97110 THERAPEUTIC EXERCISES: CPT

## 2019-12-09 ENCOUNTER — HOSPITAL ENCOUNTER (OUTPATIENT)
Dept: PHYSICAL THERAPY | Age: 59
Setting detail: THERAPIES SERIES
Discharge: HOME | End: 2019-12-09
Payer: COMMERCIAL

## 2019-12-09 PROCEDURE — 97110 THERAPEUTIC EXERCISES: CPT

## 2019-12-09 ASSESSMENT — PAIN SCALES - GENERAL: PAINLEVEL_OUTOF10: 1

## 2019-12-11 ENCOUNTER — HOSPITAL ENCOUNTER (OUTPATIENT)
Dept: PHYSICAL THERAPY | Age: 59
Setting detail: THERAPIES SERIES
Discharge: HOME | End: 2019-12-11
Payer: COMMERCIAL

## 2019-12-11 PROCEDURE — 97110 THERAPEUTIC EXERCISES: CPT

## 2019-12-11 ASSESSMENT — PAIN SCALES - GENERAL: PAINLEVEL_OUTOF10: 1

## 2019-12-13 ENCOUNTER — HOSPITAL ENCOUNTER (OUTPATIENT)
Dept: PHYSICAL THERAPY | Age: 59
Setting detail: THERAPIES SERIES
Discharge: HOME | End: 2019-12-13
Payer: COMMERCIAL

## 2019-12-13 PROCEDURE — 97110 THERAPEUTIC EXERCISES: CPT

## 2019-12-13 ASSESSMENT — PAIN SCALES - GENERAL: PAINLEVEL_OUTOF10: 1

## 2019-12-16 ENCOUNTER — APPOINTMENT (OUTPATIENT)
Dept: PHYSICAL THERAPY | Age: 59
End: 2019-12-16
Payer: COMMERCIAL

## 2019-12-18 ENCOUNTER — HOSPITAL ENCOUNTER (OUTPATIENT)
Dept: PHYSICAL THERAPY | Age: 59
Setting detail: THERAPIES SERIES
Discharge: HOME | End: 2019-12-18
Payer: COMMERCIAL

## 2019-12-18 PROCEDURE — 97110 THERAPEUTIC EXERCISES: CPT

## 2019-12-18 ASSESSMENT — PAIN SCALES - GENERAL: PAINLEVEL_OUTOF10: 1

## 2019-12-20 ENCOUNTER — APPOINTMENT (OUTPATIENT)
Dept: PHYSICAL THERAPY | Age: 59
End: 2019-12-20
Payer: COMMERCIAL

## 2020-01-24 ENCOUNTER — HOSPITAL ENCOUNTER (OUTPATIENT)
Dept: PHYSICAL THERAPY | Age: 60
Setting detail: THERAPIES SERIES
Discharge: HOME | End: 2020-01-24
Payer: COMMERCIAL

## 2020-01-24 PROCEDURE — 97161 PT EVAL LOW COMPLEX 20 MIN: CPT

## 2020-01-24 PROCEDURE — 97110 THERAPEUTIC EXERCISES: CPT

## 2020-01-24 ASSESSMENT — PAIN DESCRIPTION - LOCATION: LOCATION: KNEE

## 2020-01-24 ASSESSMENT — PAIN DESCRIPTION - ORIENTATION: ORIENTATION: RIGHT

## 2020-01-24 ASSESSMENT — PAIN SCALES - GENERAL: PAINLEVEL_OUTOF10: 5

## 2020-01-24 NOTE — PLAN OF CARE
[facility] DILLAN TRUJILLO       Phone: [phone]   Date: 2020                      Outpatient Physical Therapy  Fax: [phone]    ACCT #: [de-identified]                     Plan of Care  [facility]#: [id number]  Patient: Evin Moy  : 1960    Referring Practitioner: Dr. Andressa Garcia    Referral Date : 20    Diagnosis: S/P right TKA  Onset Date: 20  Treatment Diagnosis: Right knee pain    Assessment  Body structures, Functions, Activity limitations: Decreased functional mobility , Decreased ROM, Decreased strength, Increased pain, Decreased balance  Assessment: S/P right knee TKA. Patient educated on and issued HEP handout. Plan to progress therex, gait and manual therapy. Prognosis: Good    Treatment Plan   Days: 3 times per week Weeks: 4 weeks Total # of Visits Approved: 12    Patient Education/HEP, Therapeutic Exercise, Manual Therapy and Gait Training     Goals  Short term goals  Time Frame for Short term goals: 9  Short term goal 1: Increase PROM right knee flexion 90 degrees to transfer in and out of car without difficulty  Short term goal 2: Patient to have increase AROM knee extension 0 degrees to ambulate with cane independently  Long term goals  Time Frame for Long term goals : 12  Long term goal 1: Patient to ambulate without device indoors  Long term goal 2: Increase strength right knee extension 4+/5 to squat with good form  Long term goal 3: Increase ROM right knee flexion 110 degrees to alterante feet on stairs WFL  Long term goal 4: Improve functional mobility with LEFS score >60/80     Oscar Tejada, PT   Date: 2020    ______________________________________ Date: 2020  Physician Signature  By signing above or cosigning electronically, I have reviewed this Plan of Care and certify a need for medically necessary rehabilitation services.

## 2020-01-24 NOTE — PROGRESS NOTES
Assessment  Body structures, Functions, Activity limitations: Decreased functional mobility , Decreased ROM, Decreased strength, Increased pain, Decreased balance  Assessment: S/P right knee TKA. Patient educated on and issued HEP handout. Plan to progress therex, gait and manual therapy. Prognosis: Good  Decision Making: Low Complexity  History: as above  Exam: LEFS score 45/80    Clinical Presentation:  Stable/Uncomplicated  The Following Comorbities will impact the patients progression and Plan of Care:   Previous Orthopedic Injury/Surgery            Education: On POC and HEP          Goals  Short term goals  Time Frame for Short term goals: 9  Short term goal 1: Increase PROM right knee flexion 90 degrees to transfer in and out of car without difficulty  Short term goal 2: Patient to have increase AROM knee extension 0 degrees to ambulate with cane independently    Long term goals  Time Frame for Long term goals : 12  Long term goal 1: Patient to ambulate without device indoors  Long term goal 2: Increase strength right knee extension 4+/5 to squat with good form  Long term goal 3:  Increase ROM right knee flexion 110 degrees to alterante feet on stairs WFL  Long term goal 4: Improve functional mobility with LEFS score >60/80    Patient's Goal:    Walk without device    Timed Code Treatment Minutes: 15 Minutes  Total Treatment Time: 40     Time In: 10:35  Time Out: 11:15    Ortega Champagne, PT Date: 1/24/2020

## 2020-01-24 NOTE — OP NOTE
PT/OT INITIAL EVALUATION REPORT   Leonila Reyes Date: 1/24/2020     Insurance Company: _____________  Patient Name: Ehsan Schulz                                           Patient ID #:  ____________________   Date of Birth / Age: 1960/ 64 y.o. Date Of Injury:  /  /   Date Of Surgery: 1 /22 / 2020  ICD-9 Code(s): _________________ Diagnosis: S/P right TKA  Referring Practitioner: Dr. Carlin Mcgregor  Referring Physician ID #:_______________   Therapy Office: [address] [place] [address] and [place] [id number], [facility] [address]: [x]PT / []OT       OBJECTIVE FINDINGS    Involved Region: []Left / [x]Right / []N/A     Strength ( 0-5 )       Range of Motion   Motion  /  Grade     PROM    AROM   Strength RLE  Strength RLE: Exception  R Hip Flexion: 3/5  R Knee Flexion: 3-/5  R Knee Extension: 3-/5  R Ankle Dorsiflexion: 4+/5 PROM RLE (degrees)  RLE PROM: Exceptions  R Knee Flexion 0-145: 10-75  R Knee Extension 0: -10 from neutral        Strength LLE  Strength LLE: WFL              AROM RLE (degrees)  RLE AROM: Exceptions  R Knee Flexion 0-145: 10-70  R Knee Extension 0: -10 from neutral                         How / Where Injury Occurred:     Work Related? []Yes  []No      Pertinent History: Additional Pertinent Hx: Right knee TKA 1/22/2020 in [place], returned home same day.    Pain 4-5/10 right knee.  Had knee block, now on pain meds.  Compression pumb x 14 days needs on 20 hours a day.  Works but self emplyeed, mostly desk job.  Hx- Left knee TKA 11/20/19, HBP.  Meds- celebrex and tylenol for knee.     Pain:  Pain Scale:  5/10  Nature: [x]constant / []intermittent / []localized / []radiating     Functional Deficits / Additional Information:   Exam: LEFS score 45/80 ; off work  Specific Treatment Plan:  [] HP/CP     [] Electrical Stimulation   [x]  Therapeutic Exercise   [x]  Gait Training  [] Traction   [] Ultrasound                   []  Massage                        []  Work Conditioning   [] Aquatics  [] Back

## 2020-01-27 ENCOUNTER — HOSPITAL ENCOUNTER (OUTPATIENT)
Dept: PHYSICAL THERAPY | Age: 60
Setting detail: THERAPIES SERIES
Discharge: HOME | End: 2020-01-27
Payer: COMMERCIAL

## 2020-01-27 PROCEDURE — 97110 THERAPEUTIC EXERCISES: CPT

## 2020-01-27 ASSESSMENT — PAIN SCALES - GENERAL: PAINLEVEL_OUTOF10: 4

## 2020-01-29 ENCOUNTER — HOSPITAL ENCOUNTER (OUTPATIENT)
Dept: PHYSICAL THERAPY | Age: 60
Setting detail: THERAPIES SERIES
Discharge: HOME | End: 2020-01-29
Payer: COMMERCIAL

## 2020-01-29 PROCEDURE — 97110 THERAPEUTIC EXERCISES: CPT

## 2020-01-29 ASSESSMENT — PAIN SCALES - GENERAL: PAINLEVEL_OUTOF10: 3

## 2020-01-31 ENCOUNTER — HOSPITAL ENCOUNTER (EMERGENCY)
Age: 60
Discharge: HOME | End: 2020-01-31
Attending: FAMILY MEDICINE
Payer: COMMERCIAL

## 2020-01-31 ENCOUNTER — APPOINTMENT (OUTPATIENT)
Dept: VASCULAR LAB | Age: 60
End: 2020-01-31
Payer: COMMERCIAL

## 2020-01-31 ENCOUNTER — HOSPITAL ENCOUNTER (OUTPATIENT)
Dept: PHYSICAL THERAPY | Age: 60
Setting detail: THERAPIES SERIES
Discharge: HOME | End: 2020-01-31
Payer: COMMERCIAL

## 2020-01-31 VITALS
WEIGHT: 259 LBS | DIASTOLIC BLOOD PRESSURE: 78 MMHG | SYSTOLIC BLOOD PRESSURE: 131 MMHG | TEMPERATURE: 97.5 F | OXYGEN SATURATION: 99 % | BODY MASS INDEX: 37.08 KG/M2 | HEIGHT: 70 IN | HEART RATE: 101 BPM | RESPIRATION RATE: 18 BRPM

## 2020-01-31 PROCEDURE — 99283 EMERGENCY DEPT VISIT LOW MDM: CPT

## 2020-01-31 PROCEDURE — 93971 EXTREMITY STUDY: CPT

## 2020-01-31 RX ORDER — TRAMADOL HYDROCHLORIDE 50 MG/1
50 TABLET ORAL EVERY 6 HOURS PRN
COMMUNITY
End: 2020-09-11 | Stop reason: ALTCHOICE

## 2020-01-31 RX ORDER — OXYCODONE HYDROCHLORIDE 5 MG/1
5 TABLET ORAL EVERY 4 HOURS PRN
COMMUNITY
End: 2020-03-13 | Stop reason: ALTCHOICE

## 2020-01-31 RX ORDER — CELECOXIB 200 MG/1
200 CAPSULE ORAL 2 TIMES DAILY
COMMUNITY
End: 2021-03-12 | Stop reason: ALTCHOICE

## 2020-01-31 ASSESSMENT — PAIN DESCRIPTION - FREQUENCY: FREQUENCY: CONTINUOUS

## 2020-01-31 ASSESSMENT — PAIN SCALES - GENERAL: PAINLEVEL_OUTOF10: 5

## 2020-01-31 ASSESSMENT — PAIN DESCRIPTION - ORIENTATION: ORIENTATION: RIGHT

## 2020-01-31 ASSESSMENT — PAIN DESCRIPTION - PAIN TYPE: TYPE: ACUTE PAIN

## 2020-01-31 ASSESSMENT — PAIN DESCRIPTION - LOCATION: LOCATION: LEG

## 2020-01-31 ASSESSMENT — PAIN DESCRIPTION - DESCRIPTORS: DESCRIPTORS: ACHING

## 2020-01-31 NOTE — PROGRESS NOTES
[facility]    Date: 2020  Patient Name: Vi Spann        : 1960     Patient called to let therapist know he was seen in the emergency room today for possible blood clot, and was cleared by ER, does not have a blood clot in his leg.       Nimesh Mullen Date: 2020

## 2020-01-31 NOTE — ED NOTES
Pt arrives from physical therapy after complaining of right knee and leg pain. Pt is nine days post op total knee replacement. Pt had left knee replaced in November of 6437 and has no complications with this. States has had increased pain and swelling to lower right leg since surgery. Pt states did not have any post surgical problems after surgery in November. Reported to physical therapy today and was sent to ED for evaluation.      Rody Gonzalez RN  01/31/20 2210

## 2020-01-31 NOTE — ED PROVIDER NOTES
eMERGENCY dEPARTMENT eNCOUnter        Pelonjamel Gardner    Chief Complaint   Patient presents with    Leg Pain     Right  knee replaced 9 days ago now has increased pain and swelling to Right lower leg. HPI    Kajal Shelby is a 64 y.o. male who presents with right knee pain and increased swelling over the last 2 days. He has been going to therapy for his condition. With the increase in pain and swelling he is directed to come to the ER by therapy for evaluation of possible blood clot. He has had increased swelling above and below the knee replacement. The incision is been intact. He has been compliant with compressive therapy to prevent DVT as well as doing his physical therapy. Not on any anticoagulant. There is been no injury to the knee or leg after surgery. REVIEW OF SYSTEMS    All body systems reviewed with pertinent positive findings mentioned in the HPI. PAST MEDICAL HISTORY    Past Medical History:   Diagnosis Date    GERD (gastroesophageal reflux disease) 4/24/2013    HTN (hypertension) 4/24/2013    Hyperlipidemia 3/26/2014    Osteoarthritis     Dr Mervin Gay Pneumonia     Seasonal allergies     Toe fracture, left        SURGICAL HISTORY    Past Surgical History:   Procedure Laterality Date    KNEE SURGERY Right        CURRENT MEDICATIONS    Current Outpatient Rx   Medication Sig Dispense Refill    celecoxib (CELEBREX) 200 MG capsule Take 200 mg by mouth 2 times daily      traMADol (ULTRAM) 50 MG tablet Take 50 mg by mouth every 6 hours as needed for Pain. 1 or 2      oxyCODONE (ROXICODONE) 5 MG immediate release tablet Take 5 mg by mouth every 4 hours as needed for Pain.       losartan (COZAAR) 100 MG tablet Take 1 tablet by mouth daily 90 tablet 1    hydrochlorothiazide (HYDRODIURIL) 25 MG tablet Take 1 tablet by mouth daily 90 tablet 1    aspirin EC 81 MG EC tablet Take 1 tablet by mouth daily (Patient taking differently: Take 81 mg by mouth 2 times daily For 6 weeks post of current or ex partner: None     Emotionally abused: None     Physically abused: None     Forced sexual activity: None   Other Topics Concern    None   Social History Narrative    None       PHYSICAL EXAM    VITAL SIGNS: /78   Pulse 101   Temp 97.5 °F (36.4 °C) (Temporal)   Resp 18   Ht 5' 10\" (1.778 m)   Wt 259 lb (117.5 kg)   SpO2 99%   BMI 37.16 kg/m²   Constitutional:  Well developed, well nourished, 82-year-old male with right leg pain and swelling, no acute distress, non-toxic appearance   HENT:  Atraumatic, external ears normal, nose normal, oropharynx moist.  Neck- normal range of motion, no tenderness, supple   Respiratory:  No respiratory distress, normal breath sounds. Cardiovascular:  Normal rate, normal rhythm, no murmurs, no gallops, no rubs   GI:  Soft, nondistended, normal bowel sounds, nontender   Musculoskeletal: The right leg shows swelling and ecchymosis above and below the knee. The anterior surgical incision is intact. There is no drainage. The mid calf circumference is 22 inches at the calf and the circumference at the knee is 21 inches. Good pedal pulses and capillary refill. Significant swelling and tenderness above and below the knee. The patient's knee is flexed on exam.  Integument:  Well hydrated, ecchymosis and swelling of the right lower extremity. Some incisional erythema. Neurologic: Alert and oriented male. He has good awareness with his sensation and motor sense. He does have good control of his extremities allowing for the limited motion of the right lower extremity which is postop. RADIOLOGY/PROCEDURES    Venous Doppler of the right lower extremity shows no DVT. There are varicosities and soft tissue swelling noted. ED COURSE & MEDICAL DECISION MAKING    Pertinent Labs & Imaging studies reviewed. (See chart for details)    Summation      Patient Course: 82-year-old male with right leg pain and swelling after knee replacement.   Comes to the ER

## 2020-01-31 NOTE — PROGRESS NOTES
[facility]    Date: 2020  Patient Name: Ashwini Sports        : 1960       Patient came into PT appointment c/o severe pain in calf and shin of right leg. Right leg very swollen from recent TKA surgery. Right lower leg colorful with bruising and has redness and hot to touch. Advised patient to go to emergency room to be checked for blood clot. Cancelled Appt for today, no charge.       Bertin Nagel, PT Date: 2020

## 2020-02-03 ENCOUNTER — HOSPITAL ENCOUNTER (OUTPATIENT)
Dept: PHYSICAL THERAPY | Age: 60
Setting detail: THERAPIES SERIES
Discharge: HOME | End: 2020-02-03
Payer: COMMERCIAL

## 2020-02-03 PROCEDURE — 97110 THERAPEUTIC EXERCISES: CPT

## 2020-02-03 ASSESSMENT — PAIN SCALES - GENERAL: PAINLEVEL_OUTOF10: 4

## 2020-02-03 NOTE — PROGRESS NOTES
Phone: [address]      Fax: [phone]                            Outpatient Physical Therapy                                                                            Daily Note    Date: 2/3/2020  Patient Name: Oral Valdovinos        MRN: [id number]   ACCT#:  [de-identified]  : 1960  (64 y.o.)    Referring Practitioner: Dr. Marissa Cano    Referral Date : 20    Diagnosis: S/P right TKA  Treatment Diagnosis: Right knee pain    Onset Date: 20  PT Insurance Information: BCBS  Total # of Visits Approved: 12 Per Physician Order  Total # of Visits to Date: 4  No Show: 0  Canceled Appointment: 0  Plan of Care/Certification Expiration Date: 20    Pre-Treatment Pain:  4/10     Assessment  Assessment: Patient arrived stating after going to hospital and having leg checked no clot detected. Bruising and swelling continue. Patient reports increased bruising on gastroc. AAROM knee flexion with knee bend on step = 76 degrees. PROM knee flexion = 88 degrees. Chart Reviewed: No    Plan  Plan: Continue with current plan    Exercises/Modalities/Manual:  See DocFlow Sheet    Education:           Goals  (Total # of Visits to Date: 4)   Short Term Goals - Time Frame for Short term goals: 9  Short term goal 1: Increase PROM right knee flexion 90 degrees to transfer in and out of car without difficulty  Short term goal 2: Patient to have increase AROM knee extension 0 degrees to ambulate with cane independently             Long Term Goals - Time Frame for Long term goals : 12  Long term goal 1: Patient to ambulate without device indoors  Long term goal 2: Increase strength right knee extension 4+/5 to squat with good form  Long term goal 3:  Increase ROM right knee flexion 110 degrees to alterante feet on stairs WFL  Long term goal 4: Improve functional mobility with LEFS score >60/80       Post Treatment Pain:  3/10         Time In: 1034  Time Out: 1107  Timed Code Treatment

## 2020-02-04 ENCOUNTER — TELEPHONE (OUTPATIENT)
Dept: FAMILY MEDICINE CLINIC | Age: 60
End: 2020-02-04

## 2020-02-04 NOTE — TELEPHONE ENCOUNTER
Charbel Miller recently had a knee replacement done and was on different medications. He said just recently he noticed that his stool was dark. He called his surgeon and they wanted him to call his PCP and have them order a test to check for blood in stool. Can he have this done? Please let Charbel Miller know.     Health Maintenance   Topic Date Due    Hepatitis C screen  1960    DTaP/Tdap/Td vaccine (1 - Tdap) 04/04/1973    HIV screen  04/04/1977    Shingles Vaccine (1 of 2) 04/04/2012    Potassium monitoring  08/18/2018    Creatinine monitoring  08/18/2018    A1C test (Diabetic or Prediabetic)  08/31/2019    Flu vaccine (1) 09/01/2019    Lipid screen  08/18/2022    Colon cancer screen colonoscopy  07/11/2026             (applicable per patient's age: Cancer Screenings, Depression Screening, Fall Risk Screening, Immunizations)    Hemoglobin A1C (%)   Date Value   08/31/2018 5.8   08/18/2017 5.7     LDL Cholesterol (mg/dL)   Date Value   02/13/2016 125     LDL Calculated (mg/dL)   Date Value   08/18/2017 160     AST (U/L)   Date Value   08/18/2017 17     ALT (U/L)   Date Value   08/18/2017 27     BUN (mg/dL)   Date Value   08/18/2017 13      (goal A1C is < 7)   (goal LDL is <100) need 30-50% reduction from baseline     BP Readings from Last 3 Encounters:   01/31/20 131/78   12/02/19 127/86   09/04/19 120/78    (goal /80)      All Future Testing planned in [facility]:  Lab Frequency Next Occurrence       Next Visit Date:  Future Appointments   Date Time Provider Cassandra Wan   2/5/2020 10:30 AM Oscar Tejada [facility]   2/7/2020 10:30 AM Rebecca Stafford PTA [facility]   2/10/2020 10:30 AM Rebecca Stafford PTA [facility]   2/12/2020 10:30 AM Oscar Tejada PT [facility]   2/14/2020 10:30 AM Leann Hernandez PTA [facility]            Patient Active Problem List:     HTN (hypertension)     GERD (gastroesophageal reflux disease)     Seasonal allergies     Pure hypercholesterolemia     Arthritis     Environmental and seasonal allergies

## 2020-02-05 ENCOUNTER — HOSPITAL ENCOUNTER (OUTPATIENT)
Dept: PHYSICAL THERAPY | Age: 60
Setting detail: THERAPIES SERIES
Discharge: HOME | End: 2020-02-05
Payer: COMMERCIAL

## 2020-02-05 PROCEDURE — 97110 THERAPEUTIC EXERCISES: CPT

## 2020-02-05 ASSESSMENT — PAIN SCALES - GENERAL: PAINLEVEL_OUTOF10: 3

## 2020-02-05 NOTE — PROGRESS NOTES
Phone: Leela Barth      Fax: [phone]                            Outpatient Physical Therapy                                                                            Daily Note    Date: 2020  Patient Name: Jhon Gross        MRN: [id number]   ACCT#:  [de-identified]  : 1960  (64 y.o.)    Referring Practitioner: Dr. Parsons Human    Referral Date : 20    Diagnosis: S/P right TKA  Treatment Diagnosis: Right knee pain    Onset Date: 20  PT Insurance Information: BCBS  Total # of Visits Approved: 9(insurance approved 9) Per Physician Order  Total # of Visits to Date: 5  Plan of Care/Certification Expiration Date: 20    Pre-Treatment Pain:  3/10     Assessment  Assessment: Patient walking with cane independently with mild limp. PROM right knee extension 2 degrees for full extension. Chart Reviewed: No    Plan  Plan: Continue with current plan    Exercises/Modalities/Manual:  See DocFlow Sheet    Education:         Goals  (Total # of Visits to Date: 5)   Short Term Goals - Time Frame for Short term goals: 9  Short term goal 1: Increase PROM right knee flexion 90 degrees to transfer in and out of car without difficulty  Short term goal 2: Patient to have increase AROM knee extension 0 degrees to ambulate with cane independently-Met             Long Term Goals - Time Frame for Long term goals : 12  Long term goal 1: Patient to ambulate without device indoors  Long term goal 2: Increase strength right knee extension 4+/5 to squat with good form  Long term goal 3:  Increase ROM right knee flexion 110 degrees to alterante feet on stairs WFL  Long term goal 4: Improve functional mobility with LEFS score >60/80       Post Treatment Pain:  2/10    Time In: 10:30    Time Out : 11:15        Timed Code Treatment Minutes: 45 Minutes  Total Treatment Time: 1125 Dalila Uriostegui PT     Date: 2020

## 2020-02-07 ENCOUNTER — HOSPITAL ENCOUNTER (OUTPATIENT)
Dept: PHYSICAL THERAPY | Age: 60
Setting detail: THERAPIES SERIES
Discharge: HOME | End: 2020-02-07
Payer: COMMERCIAL

## 2020-02-07 PROCEDURE — 97140 MANUAL THERAPY 1/> REGIONS: CPT

## 2020-02-07 PROCEDURE — 97110 THERAPEUTIC EXERCISES: CPT

## 2020-02-07 ASSESSMENT — PAIN SCALES - GENERAL: PAINLEVEL_OUTOF10: 3

## 2020-02-10 ENCOUNTER — HOSPITAL ENCOUNTER (OUTPATIENT)
Dept: PHYSICAL THERAPY | Age: 60
Setting detail: THERAPIES SERIES
Discharge: HOME | End: 2020-02-10
Payer: COMMERCIAL

## 2020-02-10 PROCEDURE — 97110 THERAPEUTIC EXERCISES: CPT

## 2020-02-10 ASSESSMENT — PAIN SCALES - GENERAL: PAINLEVEL_OUTOF10: 2

## 2020-02-10 NOTE — PROGRESS NOTES
Phone: Leela Barth      Fax: [phone]                            Outpatient Physical Therapy                                                                            Daily Note    Date: 2/10/2020  Patient Name: Jessie Gregory        MRN: [id number]   ACCT#:  [de-identified]  : 1960  (64 y.o.)    Referring Practitioner: Dr. Martinez Hand    Referral Date : 20    Diagnosis: S/P right TKA  Treatment Diagnosis: Right knee pain    Onset Date: 20  PT Insurance Information: BCBS  Total # of Visits Approved: 9(insurance approved 9) Per Physician Order  Total # of Visits to Date: 7  Plan of Care/Certification Expiration Date: 20    Pre-Treatment Pain:  2/10     Assessment  Assessment: Patient reports has ambulated in home without using cane ambulating from kitchen and livingroom. Patient states concentrates on heel toe pattern and pattern seems to be improving. PROM knee flexion = 101 degrees  Chart Reviewed: No    Plan  Plan: Continue with current plan    Exercises/Modalities/Manual:  See DocFlow Sheet    Education:           Goals  (Total # of Visits to Date: 7)   Short Term Goals - Time Frame for Short term goals: 9  Short term goal 1: Increase PROM right knee flexion 90 degrees to transfer in and out of car without difficulty Met  Short term goal 2: Patient to have increase AROM knee extension 0 degrees to ambulate with cane independently-Met             Long Term Goals - Time Frame for Long term goals : 12  Long term goal 1: Patient to ambulate without device indoors  Long term goal 2: Increase strength right knee extension 4+/5 to squat with good form  Long term goal 3:  Increase ROM right knee flexion 110 degrees to alterante feet on stairs WFL  Long term goal 4: Improve functional mobility with LEFS score >60/80       Post Treatment Pain:  3/10      Time In: 1031  Time Out: 1115  Timed Code Treatment Minutes: 44 Minutes  Total Treatment Time: 44

## 2020-02-10 NOTE — OP NOTE
[facility]   P R O G R E S S   C H A R T    Member: Kajal Shelby    Member ID #    Diagnosis: S/P right TKA Insurance Company:   Referring Practitioner: Dr. Navid Silva  Referring Physican ID#   Therapy Office:[facility]  Date fo Birth / Age: 1960/ 64 y.o.     [] Clinical Update Treating Clinician: Sam Allan  ICD-9 (s):   Discipline:  [x]PT / []OT  Diagnosis: S/P right TKA  [x] Additional  Involved Side:  []Left / [x] Right / [] N/A Date of Injury:  _1/22/2020__       Visits Request       Date of Surgery:  __/__/_       Date:   1/24/2020 Date:  2/10/20   Total Number of Visits To Date Total # of Visits to Date: 1  (Total Requested: 12) Total # of Visits to Date: 7  (request additional 6 visits)   Pain Scale      /10 5/10 2/10   Gait  w.walker with limp Using straight cane with mild limp   AROM                        AROM RLE (degrees)  RLE AROM: Exceptions  R Knee Flexion 0-145: 10-70  R Knee Extension 0: -10 from neutral    AROM RLE (degrees)  RLE AROM: Exceptions  R Knee Flexion  2-101                    Strength      Strength RLE  Strength RLE: Exception  R Hip Flexion: 3/5  R Knee Flexion: 3-/5  R Knee Extension: 3-/5  R Ankle Dorsiflexion: 4+/5  ngth RLE  Strength RLE: Exception  R Hip Flexion: 4-/5  R Knee Flexion: 3+/5  R Knee Extension: 4-/5  R Ankle     Strength LLE  Strength LLE: [facility]                Proprioceptive/ Neurological Deficits     Functional Limitations / Additional Comments           Exam: LEFS score 45/80: off work Exam: LEFS score 45/80: off work        Copyright 2012 [id number]      Created: 9/99 / Revised 1/12

## 2020-02-12 ENCOUNTER — HOSPITAL ENCOUNTER (OUTPATIENT)
Dept: PHYSICAL THERAPY | Age: 60
Setting detail: THERAPIES SERIES
Discharge: HOME | End: 2020-02-12
Payer: COMMERCIAL

## 2020-02-12 PROCEDURE — 97140 MANUAL THERAPY 1/> REGIONS: CPT

## 2020-02-12 PROCEDURE — 97110 THERAPEUTIC EXERCISES: CPT

## 2020-02-12 ASSESSMENT — PAIN DESCRIPTION - LOCATION: LOCATION: KNEE

## 2020-02-12 ASSESSMENT — PAIN DESCRIPTION - ORIENTATION: ORIENTATION: RIGHT

## 2020-02-12 ASSESSMENT — PAIN SCALES - GENERAL: PAINLEVEL_OUTOF10: 2

## 2020-02-12 NOTE — PROGRESS NOTES
Phone: Leela Barth      Fax: [phone]                            Outpatient Physical Therapy                                                                            Daily Note    Date: 2020  Patient Name: Jessica Wheeler        MRN: [id number]   ACCT#:  [de-identified]  : 1960  (64 y.o.)    Referring Practitioner: Dr. Bony Valdovinos    Referral Date : 20    Diagnosis: S/P right TKA  Treatment Diagnosis: Right knee pain    Onset Date: 20  PT Insurance Information: BCBS  Total # of Visits Approved: 13(insurance approved 9) Per Physician Order  Total # of Visits to Date: 8  Plan of Care/Certification Expiration Date: 20    Pre-Treatment Pain:  2/10     Assessment  Assessment: Patient ambulating in house without device. Walked into Pt with cane with even stride, heel to toe gait WFL. Strength Right hip flexion 4+/5, knee extension 4+/5. Worked on squat form, patient able to squat WFL. Insurance approved additional 4 visits for total of 13 visits. Chart Reviewed: No    Plan  Plan: Continue with current plan    Exercises/Modalities/Manual:  See DocFlow Sheet    Education:         Goals  (Total # of Visits to Date: 8)   Short Term Goals - Time Frame for Short term goals: 9  Short term goal 1: Increase PROM right knee flexion 90 degrees to transfer in and out of car without difficulty Met  Short term goal 2: Patient to have increase AROM knee extension 0 degrees to ambulate with cane independently-Met             Long Term Goals - Time Frame for Long term goals : 12  Long term goal 1: Patient to ambulate without device indoors-Met  Long term goal 2: Increase strength right knee extension 4+/5 to squat with good form-Met  Long term goal 3:  Increase ROM right knee flexion 110 degrees to alterante feet on stairs WFL  Long term goal 4: Improve functional mobility with LEFS score >60/80       Post Treatment Pain:  210    Time In: 10:30    Time Out : 11:15

## 2020-02-14 ENCOUNTER — HOSPITAL ENCOUNTER (OUTPATIENT)
Dept: PHYSICAL THERAPY | Age: 60
Setting detail: THERAPIES SERIES
Discharge: HOME | End: 2020-02-14
Payer: COMMERCIAL

## 2020-02-14 PROCEDURE — 97110 THERAPEUTIC EXERCISES: CPT

## 2020-02-14 PROCEDURE — 97140 MANUAL THERAPY 1/> REGIONS: CPT

## 2020-02-14 ASSESSMENT — PAIN SCALES - GENERAL: PAINLEVEL_OUTOF10: 3

## 2020-02-17 ENCOUNTER — HOSPITAL ENCOUNTER (OUTPATIENT)
Dept: PHYSICAL THERAPY | Age: 60
Setting detail: THERAPIES SERIES
Discharge: HOME | End: 2020-02-17
Payer: COMMERCIAL

## 2020-02-17 PROCEDURE — 97110 THERAPEUTIC EXERCISES: CPT

## 2020-02-17 ASSESSMENT — PAIN SCALES - GENERAL: PAINLEVEL_OUTOF10: 2

## 2020-02-19 ENCOUNTER — HOSPITAL ENCOUNTER (OUTPATIENT)
Dept: PHYSICAL THERAPY | Age: 60
Setting detail: THERAPIES SERIES
Discharge: HOME | End: 2020-02-19
Payer: COMMERCIAL

## 2020-02-19 PROCEDURE — 97110 THERAPEUTIC EXERCISES: CPT

## 2020-02-19 PROCEDURE — 97140 MANUAL THERAPY 1/> REGIONS: CPT

## 2020-02-19 ASSESSMENT — PAIN SCALES - GENERAL: PAINLEVEL_OUTOF10: 1

## 2020-02-19 ASSESSMENT — PAIN DESCRIPTION - LOCATION: LOCATION: KNEE

## 2020-02-19 ASSESSMENT — PAIN DESCRIPTION - ORIENTATION: ORIENTATION: RIGHT

## 2020-02-21 ENCOUNTER — APPOINTMENT (OUTPATIENT)
Dept: PHYSICAL THERAPY | Age: 60
End: 2020-02-21
Payer: COMMERCIAL

## 2020-02-24 ENCOUNTER — HOSPITAL ENCOUNTER (OUTPATIENT)
Dept: PHYSICAL THERAPY | Age: 60
Setting detail: THERAPIES SERIES
Discharge: HOME | End: 2020-02-24
Payer: COMMERCIAL

## 2020-02-24 PROCEDURE — 97110 THERAPEUTIC EXERCISES: CPT

## 2020-02-24 ASSESSMENT — PAIN SCALES - GENERAL: PAINLEVEL_OUTOF10: 1

## 2020-02-24 NOTE — PROGRESS NOTES
Phone: Leela Barth      Fax: [phone]                            Outpatient Physical Therapy                                                                            Daily Note    Date: 2020  Patient Name: Justo Cai        MRN: [id number]   ACCT#:  [de-identified]  : 1960  (64 y.o.)    Referring Practitioner: Dr. Sharlene Mohamud    Referral Date : 20    Diagnosis: S/P right TKA  Treatment Diagnosis: Right knee pain    Onset Date: 20  PT Insurance Information: BCBS  Total # of Visits Approved: 13(insurance approved 9) Per Physician Order  Total # of Visits to Date: 12  Plan of Care/Certification Expiration Date: 20    Pre-Treatment Pain:  1/10     Assessment  Assessment: Patient rescheduled Friday therapy appointment for Wednesday d/t pt to see physician on Thursday AM. Patient continues to have c/o increased swelling in knee. Changed squats to ball squats as pt reported squats from last visit really irritated knee. PROM knee flexion = 106 degrees. Chart Reviewed: No    Plan  Plan: Continue with current plan    Exercises/Modalities/Manual:  See DocFlow Sheet    Education:           Goals  (Total # of Visits to Date: 15)   Short Term Goals - Time Frame for Short term goals: 9  Short term goal 1: Increase PROM right knee flexion 90 degrees to transfer in and out of car without difficulty -Met  Short term goal 2: Patient to have increase AROM knee extension 0 degrees to ambulate with cane independently-Met             Long Term Goals - Time Frame for Long term goals : 12  Long term goal 1: Patient to ambulate without device indoors-Met  Long term goal 2: Increase strength right knee extension 4+/5 to squat with good form-Met  Long term goal 3:  Increase ROM right knee flexion 110 degrees to alterante feet on stairs WFL  Long term goal 4: Improve functional mobility with LEFS score >60/80       Post Treatment Pain:  2/10        Time In: 6293  Time Out: 0945  Timed Code Treatment Minutes: 46 Minutes  Total Treatment Time: 55 Minutes    Angel Luis Torres, [facility] [place]     Date: 2/24/2020

## 2020-02-26 ENCOUNTER — HOSPITAL ENCOUNTER (OUTPATIENT)
Dept: PHYSICAL THERAPY | Age: 60
Setting detail: THERAPIES SERIES
Discharge: HOME | End: 2020-02-26
Payer: COMMERCIAL

## 2020-02-26 PROCEDURE — 97110 THERAPEUTIC EXERCISES: CPT

## 2020-02-26 PROCEDURE — 97140 MANUAL THERAPY 1/> REGIONS: CPT

## 2020-02-26 ASSESSMENT — PAIN DESCRIPTION - LOCATION: LOCATION: KNEE

## 2020-02-26 ASSESSMENT — PAIN SCALES - GENERAL: PAINLEVEL_OUTOF10: 2

## 2020-02-26 ASSESSMENT — PAIN DESCRIPTION - ORIENTATION: ORIENTATION: RIGHT

## 2020-02-26 NOTE — PROGRESS NOTES
Phone: [address]      Fax: [phone]                            Outpatient Physical Therapy                                                                            Daily Note    Date: 2020  Patient Name: Justo Cai        MRN: [id number]   ACCT#:  [de-identified]  : 1960  (64 y.o.)    Referring Practitioner: Dr. Sharlene Mohamud    Referral Date : 20    Diagnosis: S/P right TKA  Treatment Diagnosis: Right knee pain    Onset Date: 20  PT Insurance Information: BCBS  Total # of Visits Approved: 13(insurance approved 9) Per Physician Order  Total # of Visits to Date: 13  Plan of Care/Certification Expiration Date: 20    Pre-Treatment Pain:  210     Assessment  Assessment: Pain 1-2/10 right knee. Main c/o persistent swelling in knee. Gait WFL without device indoors; using cane when leaves house. ROM right knee 0-110 degrees. He can alternate feet up stairs, but non-alternates down stairs. Strength right knee extension 4+/5. LEFS score  43/80.  script and completed approved number of insurance visits. Chart Reviewed: No    Plan  Plan: Hold pending MD assessment    Exercises/Modalities/Manual:  See DocFlow Sheet    Education:           Goals  (Total # of Visits to Date:  [facility])   Short Term Goals - Time Frame for Short term goals: 9  Short term goal 1: Increase PROM right knee flexion 90 degrees to transfer in and out of car without difficulty -Met  Short term goal 2: Patient to have increase AROM knee extension 0 degrees to ambulate with cane independently-Met             Long Term Goals - Time Frame for Long term goals : 12  Long term goal 1: Patient to ambulate without device indoors-Met  Long term goal 2: Increase strength right knee extension 4+/5 to squat with good form-Met  Long term goal 3:  Increase ROM right knee flexion 110 degrees to alterante feet on stairs WFL-Not Met  Long term goal 4: Improve functional mobility with LEFS score >60/80-Not Met       Post Treatment Pain:  2/10    Time In: 12:52    Time Out : 13:37        Timed Code Treatment Minutes: 45 Minutes  Total Treatment Time: 100 [facility], PT     Date: 2/26/2020

## 2020-02-26 NOTE — PROGRESS NOTES
Phone: Leela Barth            Fax: [phone]                             Outpatient Physical Therapy                                                                      Progress Report    Date: 2020   MRN: [id number]    ACCT#: [de-identified]  Patient: Reny Harry  : 1960  Referring Practitioner: Dr. Claude Dickerson    Referral Date : 20    Diagnosis: S/P right TKA    Treatment Diagnosis: Right knee pain    Onset Date: 20  PT Insurance Information: BCBS  Total # of Visits Approved: 13  Total # of Visits to Date: 13    Assessment  Assessment: Pain 1-2/10 right knee. Main c/o persistent swelling in knee. Gait WFL without device indoors; using cane when leaves house. ROM right knee 0-110 degrees. He can alternate feet up stairs, but non-alternates down stairs. Strength right knee extension 4+/5. LEFS score  43/80.  script and completed approved number of insurance visits. Prognosis: Good    Plan  Plan: Hold pending MD assessment    Goals  Short term goals  Time Frame for Short term goals: 9  Short term goal 1: Increase PROM right knee flexion 90 degrees to transfer in and out of car without difficulty -Met  Short term goal 2: Patient to have increase AROM knee extension 0 degrees to ambulate with cane independently-Met    Long term goals  Time Frame for Long term goals : 12  Long term goal 1: Patient to ambulate without device indoors-Met  Long term goal 2: Increase strength right knee extension 4+/5 to squat with good form-Met  Long term goal 3:  Increase ROM right knee flexion 110 degrees to alterante feet on stairs WFL-Not Met  Long term goal 4: Improve functional mobility with LEFS score >60/80-Not Met    Jean Plaza    Date: 2020

## 2020-03-13 ENCOUNTER — OFFICE VISIT (OUTPATIENT)
Dept: FAMILY MEDICINE CLINIC | Age: 60
End: 2020-03-13
Payer: COMMERCIAL

## 2020-03-13 VITALS
HEART RATE: 89 BPM | DIASTOLIC BLOOD PRESSURE: 84 MMHG | OXYGEN SATURATION: 99 % | HEIGHT: 70 IN | SYSTOLIC BLOOD PRESSURE: 128 MMHG | BODY MASS INDEX: 36.65 KG/M2 | WEIGHT: 256 LBS

## 2020-03-13 PROCEDURE — 99214 OFFICE O/P EST MOD 30 MIN: CPT | Performed by: FAMILY MEDICINE

## 2020-03-13 RX ORDER — LOSARTAN POTASSIUM 100 MG/1
100 TABLET ORAL DAILY
Qty: 90 TABLET | Refills: 1 | Status: SHIPPED | OUTPATIENT
Start: 2020-03-13 | End: 2020-09-11 | Stop reason: SDUPTHER

## 2020-03-13 RX ORDER — HYDROCHLOROTHIAZIDE 25 MG/1
25 TABLET ORAL DAILY
Qty: 90 TABLET | Refills: 1 | Status: SHIPPED | OUTPATIENT
Start: 2020-03-13 | End: 2020-09-11 | Stop reason: SDUPTHER

## 2020-03-13 ASSESSMENT — ENCOUNTER SYMPTOMS
GASTROINTESTINAL NEGATIVE: 1
RESPIRATORY NEGATIVE: 1

## 2020-03-13 ASSESSMENT — PATIENT HEALTH QUESTIONNAIRE - PHQ9
1. LITTLE INTEREST OR PLEASURE IN DOING THINGS: 0
SUM OF ALL RESPONSES TO PHQ QUESTIONS 1-9: 0
2. FEELING DOWN, DEPRESSED OR HOPELESS: 0
SUM OF ALL RESPONSES TO PHQ9 QUESTIONS 1 & 2: 0
SUM OF ALL RESPONSES TO PHQ QUESTIONS 1-9: 0

## 2020-03-13 NOTE — PATIENT INSTRUCTIONS
SURVEY:    You may be receiving a survey from [employer] regarding your visit today. You may get this in the mail, through your MyChart or in your email. Please complete the survey to enable us to provide the highest quality of care to you and your family. If you cannot score us as very good ( 5 Stars) on any question, please feel free to call the office to discuss how we could have made your experience exceptional.     Thank you.     Clinical Care Team:  Dr. Emma Dill, DO Chris Ochoa, [address] [place] [address] Team:  [address] [place] [address]

## 2020-09-11 ENCOUNTER — HOSPITAL ENCOUNTER (OUTPATIENT)
Age: 60
Discharge: HOME | End: 2020-09-11
Payer: COMMERCIAL

## 2020-09-11 ENCOUNTER — OFFICE VISIT (OUTPATIENT)
Dept: FAMILY MEDICINE CLINIC | Age: 60
End: 2020-09-11
Payer: COMMERCIAL

## 2020-09-11 VITALS
WEIGHT: 254 LBS | BODY MASS INDEX: 36.36 KG/M2 | OXYGEN SATURATION: 99 % | HEIGHT: 70 IN | HEART RATE: 84 BPM | SYSTOLIC BLOOD PRESSURE: 134 MMHG | DIASTOLIC BLOOD PRESSURE: 80 MMHG

## 2020-09-11 LAB
ALBUMIN SERPL-MCNC: 4.5 G/DL (ref 3.5–5.2)
ALBUMIN/GLOBULIN RATIO: ABNORMAL (ref 1–2.5)
ALP BLD-CCNC: 86 U/L (ref 40–129)
ALT SERPL-CCNC: 22 U/L (ref 5–41)
ANION GAP SERPL CALCULATED.3IONS-SCNC: 9 MMOL/L (ref 9–17)
AST SERPL-CCNC: 16 U/L
BILIRUB SERPL-MCNC: 0.51 MG/DL (ref 0.3–1.2)
BUN BLDV-MCNC: 16 MG/DL (ref 6–20)
BUN/CREAT BLD: 20 (ref 9–20)
CALCIUM SERPL-MCNC: 10 MG/DL (ref 8.6–10.4)
CHLORIDE BLD-SCNC: 104 MMOL/L (ref 98–107)
CHOLESTEROL/HDL RATIO: 5.1
CHOLESTEROL: 198 MG/DL
CO2: 25 MMOL/L (ref 20–31)
CREAT SERPL-MCNC: 0.82 MG/DL (ref 0.7–1.2)
GFR AFRICAN AMERICAN: >60 ML/MIN
GFR NON-AFRICAN AMERICAN: >60 ML/MIN
GFR SERPL CREATININE-BSD FRML MDRD: ABNORMAL ML/MIN/{1.73_M2}
GFR SERPL CREATININE-BSD FRML MDRD: ABNORMAL ML/MIN/{1.73_M2}
GLUCOSE BLD-MCNC: 125 MG/DL (ref 70–99)
HDLC SERPL-MCNC: 39 MG/DL
LDL CHOLESTEROL: 122 MG/DL (ref 0–130)
PATIENT FASTING?: NO
POTASSIUM SERPL-SCNC: 3.9 MMOL/L (ref 3.7–5.3)
PROSTATE SPECIFIC ANTIGEN: 2.32 UG/L
SODIUM BLD-SCNC: 138 MMOL/L (ref 135–144)
TOTAL PROTEIN: 8.1 G/DL (ref 6.4–8.3)
TRIGL SERPL-MCNC: 183 MG/DL
VLDLC SERPL CALC-MCNC: ABNORMAL MG/DL (ref 1–30)

## 2020-09-11 PROCEDURE — G0103 PSA SCREENING: HCPCS

## 2020-09-11 PROCEDURE — 36415 COLL VENOUS BLD VENIPUNCTURE: CPT

## 2020-09-11 PROCEDURE — 99214 OFFICE O/P EST MOD 30 MIN: CPT | Performed by: FAMILY MEDICINE

## 2020-09-11 PROCEDURE — 80053 COMPREHEN METABOLIC PANEL: CPT

## 2020-09-11 PROCEDURE — 80061 LIPID PANEL: CPT

## 2020-09-11 RX ORDER — ASPIRIN 81 MG/1
81 TABLET ORAL DAILY
Qty: 90 TABLET | Refills: 3 | Status: SHIPPED | OUTPATIENT
Start: 2020-09-11 | End: 2021-09-01 | Stop reason: SDUPTHER

## 2020-09-11 RX ORDER — ALBUTEROL SULFATE 90 UG/1
2 AEROSOL, METERED RESPIRATORY (INHALATION) EVERY 4 HOURS PRN
Qty: 1 INHALER | Refills: 0 | Status: SHIPPED | OUTPATIENT
Start: 2020-09-11 | End: 2021-03-12 | Stop reason: ALTCHOICE

## 2020-09-11 RX ORDER — HYDROCHLOROTHIAZIDE 25 MG/1
25 TABLET ORAL DAILY
Qty: 90 TABLET | Refills: 1 | Status: SHIPPED | OUTPATIENT
Start: 2020-09-11 | End: 2021-03-12 | Stop reason: SDUPTHER

## 2020-09-11 RX ORDER — LOSARTAN POTASSIUM 100 MG/1
100 TABLET ORAL DAILY
Qty: 90 TABLET | Refills: 1 | Status: SHIPPED | OUTPATIENT
Start: 2020-09-11 | End: 2021-03-12 | Stop reason: SDUPTHER

## 2020-09-11 SDOH — ECONOMIC STABILITY: FOOD INSECURITY: WITHIN THE PAST 12 MONTHS, YOU WORRIED THAT YOUR FOOD WOULD RUN OUT BEFORE YOU GOT MONEY TO BUY MORE.: NEVER TRUE

## 2020-09-11 SDOH — ECONOMIC STABILITY: FOOD INSECURITY: WITHIN THE PAST 12 MONTHS, THE FOOD YOU BOUGHT JUST DIDN'T LAST AND YOU DIDN'T HAVE MONEY TO GET MORE.: NEVER TRUE

## 2020-09-11 SDOH — ECONOMIC STABILITY: INCOME INSECURITY: HOW HARD IS IT FOR YOU TO PAY FOR THE VERY BASICS LIKE FOOD, HOUSING, MEDICAL CARE, AND HEATING?: NOT HARD AT ALL

## 2020-09-11 ASSESSMENT — ENCOUNTER SYMPTOMS
RESPIRATORY NEGATIVE: 1
GASTROINTESTINAL NEGATIVE: 1

## 2020-09-11 NOTE — PATIENT INSTRUCTIONS
SURVEY:    You may be receiving a survey from [employer] regarding your visit today. You may get this in the mail, through your MyChart or in your email. Please complete the survey to enable us to provide the highest quality of care to you and your family. If you cannot score us as very good ( 5 Stars) on any question, please feel free to call the office to discuss how we could have made your experience exceptional.     Thank you.     Clinical Care Team:  Dr. Andry Webb, DO Mery Marie, [address] [place] [address] Team:  [address] [place] [address]

## 2020-09-11 NOTE — PROGRESS NOTES
Name: Whit Jessica  : 1960         Chief Complaint:     Chief Complaint   Patient presents with    Hypertension       History of Present Illness:      Whit Jessica is a 64 y.o.  male who presents with Hypertension      HPI     Developed L hip pain few mos ago after he got out into the yard doing a ton of work, had had negro TKA in the [facility]. Saw PT in [facility] r/t his ortho group and was told muscle imbalances r/t new alignment of knees. Lasted a few wks but has been ok for approx the past 6 wks. Some tingling around incision on L knee. Remains on celebrex, has tried 3x to stop but was unable. Last time he stopped he did ok for 3 days and then knees and legs started swelling again. Still doing a lot of outdoor work, clay on the weekends. Wears compression stockings if he'll be on his feet a lot. Able to walk in the evenings again. 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 Albuterol filled 2 yrs ago and he believes it's down to about 30 puffs on the counter. F/u HTN. Doing well, no complaints. Compliant with medications, tolerating well. Denies any chest pain, shortness of breath, loss of exercise tolerance. Intolerant of statins. Takes baby ASA daily. Past Medical History:     Past Medical History:   Diagnosis Date    GERD (gastroesophageal reflux disease) 2013    HTN (hypertension) 2013    Hyperlipidemia 3/26/2014    Osteoarthritis     Dr Pinky Severin Pneumonia     Seasonal allergies     Toe fracture, left         Past Surgical History:     Past Surgical History:   Procedure Laterality Date    KNEE SURGERY Right         Medications:       Prior to Admission medications    Medication Sig Start Date End Date Taking?  Authorizing Provider   losartan (COZAAR) 100 MG tablet Take 1 tablet by mouth daily 20  Yes Ha Lopez,    hydroCHLOROthiazide (HYDRODIURIL) 25 MG tablet Take 1 tablet by mouth daily 20  Yes Lion MCKINLEY Hussein Wilde, DO   aspirin EC 81 MG EC tablet Take 1 tablet by mouth daily 9/11/20  Yes Yesi Bourgeois DO   albuterol sulfate HFA (PROVENTIL HFA) 108 (90 Base) MCG/ACT inhaler Inhale 2 puffs into the lungs every 4 hours as needed for Wheezing (cough) 9/11/20  Yes Yesi Bourgeois DO   Omega-3 Fatty Acids (FISH OIL) 1200 MG CPDR Take by mouth   Yes Historical Provider, MD   GARLIC PO Take by mouth   Yes Historical Provider, MD   celecoxib (CELEBREX) 200 MG capsule Take 200 mg by mouth 2 times daily   Yes Historical Provider, MD   Loratadine (CLARITIN PO) Take by mouth   Yes Historical Provider, MD   montelukast (SINGULAIR) 10 MG tablet Take 1 tablet by mouth nightly 2/6/19  Yes Blaze Osorio MD   fluticasone [facility] ALLERGY RELIEF) 50 MCG/ACT nasal spray 1 spray by Nasal route daily 3/9/18  Yes Yesi Bourgeois DO        Allergies:       Pneumococcal vaccines; Statins; and Tetracyclines & related    Social History:     Tobacco:    reports that he has never smoked. He quit smokeless tobacco use about 12 years ago. His smokeless tobacco use included snuff. Alcohol:      reports current alcohol use of about 4.0 standard drinks of alcohol per week. Drug Use:  reports no history of drug use. Family History:     Family History   Problem Relation Age of Onset    Arthritis Mother     Heart Attack Father        Review of Systems:     Positive and Negative as described in HPI    Review of Systems   Constitutional: Negative. Respiratory: Negative. Cardiovascular: Negative. Gastrointestinal: Negative. Physical Exam:     Vitals:  /80   Pulse 84   Ht 5' 10\" (1.778 m)   Wt 254 lb (115.2 kg)   SpO2 99%   BMI 36.45 kg/m²   Physical Exam  Vitals signs and nursing note reviewed. Constitutional:       General: He is not in acute distress. Appearance: Normal appearance. He is well-developed. He is obese. He is not ill-appearing.    HENT:      Right Ear: Tympanic membrane and ear canal normal. Left Ear: Tympanic membrane and ear canal normal.   Eyes:      Conjunctiva/sclera: Conjunctivae normal.   Neck:      Musculoskeletal: Neck supple. Cardiovascular:      Rate and Rhythm: Normal rate and regular rhythm. Heart sounds: Normal heart sounds. Pulmonary:      Effort: Pulmonary effort is normal.      Breath sounds: Normal breath sounds. Musculoskeletal:      Comments: Normal gait, normal alignment of negro knees   Lymphadenopathy:      Cervical: No cervical adenopathy. Skin:     General: Skin is warm and dry. Neurological:      Mental Status: He is alert and oriented to person, place, and time. Psychiatric:         Mood and Affect: Mood normal.         Behavior: Behavior normal.         Judgment: Judgment normal.         Data:     Lab Results   Component Value Date     08/18/2017    K 4.3 08/18/2017     08/18/2017    CO2 22 08/18/2017    BUN 13 08/18/2017    CREATININE 0.91 08/18/2017    GLUCOSE 99 08/18/2017    PROT 6.7 11/23/2016    LABALBU 4.3 11/23/2016    BILITOT 0.39 11/23/2016    ALKPHOS 69 11/23/2016    AST 17 08/18/2017    ALT 27 08/18/2017     Lab Results   Component Value Date    WBC 6.4 08/18/2017    RBC 5.36 08/18/2017    HGB 15.8 08/18/2017    HCT 47.7 08/18/2017    MCV 89.2 08/18/2017    MCH 29.5 08/18/2017    MCHC 33.1 08/18/2017    RDW 13.2 08/18/2017     08/18/2017    MPV NOT REPORTED 12/13/2016     Lab Results   Component Value Date    TSH 1.65 08/18/2017     Lab Results   Component Value Date    CHOL 229 08/18/2017    HDL 42 08/18/2017    PSA 2.32 08/18/2017    LABA1C 5.8 08/31/2018         Assessment & Plan:        Diagnosis Orders   1. Essential hypertension  Comprehensive Metabolic Panel   2. Pure hypercholesterolemia  Lipid Panel   3. Seasonal allergies     4. Status post bilateral knee replacements     5. Screening for prostate cancer  Psa screening   HTN controlled, cont current rx.    HLD but doesn't tolerate statins - cont working on diet and exercise and continue baby ASA for primary prevention. Updating labs. Allergies controlled, cont otc meds as needed, singulair, and also refilled albuterol to use prn. Requested Prescriptions     Signed Prescriptions Disp Refills    losartan (COZAAR) 100 MG tablet 90 tablet 1     Sig: Take 1 tablet by mouth daily    hydroCHLOROthiazide (HYDRODIURIL) 25 MG tablet 90 tablet 1     Sig: Take 1 tablet by mouth daily    aspirin EC 81 MG EC tablet 90 tablet 3     Sig: Take 1 tablet by mouth daily    albuterol sulfate HFA (PROVENTIL HFA) 108 (90 Base) MCG/ACT inhaler 1 Inhaler 0     Sig: Inhale 2 puffs into the lungs every 4 hours as needed for Wheezing (cough)       Patient Instructions   SURVEY:    You may be receiving a survey from [employer] regarding your visit today. You may get this in the mail, through your MyChart or in your email. Please complete the survey to enable us to provide the highest quality of care to you and your family. If you cannot score us as very good ( 5 Stars) on any question, please feel free to call the office to discuss how we could have made your experience exceptional.     Thank you. Clinical Care Team:  [facility] [address] [place] [address] Team:  Payton Schlatter received counseling on the following healthy behaviors: medication adherence  Reviewed prior labs and health maintenance. Continue current medications, diet and exercise. Discussed use, benefit, and side effects of prescribed medications. Barriers to medication compliance addressed. Patient given educational materials - see patient instructions. All patient questions answered. Patient voiced understanding.      Electronically signed by Gillian Tolbert DO on 9/11/2020 at 7:10 AM   [place] [id number]

## 2021-01-18 ENCOUNTER — HOSPITAL ENCOUNTER (OUTPATIENT)
Dept: PHYSICAL THERAPY | Age: 61
Setting detail: THERAPIES SERIES
Discharge: HOME | End: 2021-01-18
Payer: COMMERCIAL

## 2021-01-18 PROCEDURE — 97162 PT EVAL MOD COMPLEX 30 MIN: CPT

## 2021-01-18 PROCEDURE — 97110 THERAPEUTIC EXERCISES: CPT

## 2021-01-18 ASSESSMENT — PAIN SCALES - GENERAL: PAINLEVEL_OUTOF10: 4

## 2021-01-18 NOTE — PROGRESS NOTES
Phone: [phone]          Fax: [phone]                      Outpatient Physical Therapy                                                                      Evaluation    Date: 2021  Patient: Dixon Franco  : 1960  ACCT #: [de-identified]    Referring Practitioner: Dr. Cristóbal Perez    Referral Date : 21    Diagnosis: Primary Osteoarthritis, R and L shoulder    Treatment Diagnosis: B/L Shoulder pain  Onset Date: 21  PT Insurance Information: PreAuth $[id number]  Total # of Visits Approved: 18 Per Physician Order  Total # of Visits to Date: 1  No Show: 0  Canceled Appointment: 0     Subjective  Additional Pertinent Hx: Pain with shoulders for 4yrs or more. Xrays of shoulders: B/L Shoulder bone on bone. Pt reports physician was concerned his R RTC may have a RTC, L also may have a tear. Pt reports meryl is hoping to postpone TSA for at least 1year and then will have follow up. Pt reports Meryl would like him to attempt to strengthen his shoulders. 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 Sleeping is difficult, 3-4hours at a time, hard to get back to sleep. Pt had discontinued celebrex, but noted his shoulders were worse than he realized. Physician instructed pt to resume celebrex but pt not able to refill due to exp script. Pt reports pain is B/L shoulders and is even on both sides 4-6/10. Pt does not catching/grinding of L shoulder clay. Pain is subacromial and radicates to anterior shoulder. Pain Screening  Patient Currently in Pain: Yes  Pain Assessment  Pain Assessment: 0-10  Pain Level: 4          Objective  Observation/Palpation  Posture: Fair  Palpation: Supraspinatus tenderness of R shoulder, UT tightness.      Strength RUE  R Shoulder Flexion: 4+/5  R Shoulder Extension: 4+/5  R Shoulder ABduction: 4/5  R Shoulder Internal Rotation: 4/5  R Shoulder External Rotation: 4/5  R Shoulder Horizontal ABduction: 4/5  R Elbow Flexion: 5/5  R Elbow Extension: 5/5  Strength LUE  L Shoulder Flexion: 4/5  L Shoulder Extension: 4+/5  L Shoulder ABduction: 4/5  L Shoulder Internal Rotation: 4-/5  L Shoulder External Rotation: 4-/5  L Shoulder Horizontal ABduction: 4-/5  L Elbow Flexion: 5/5  L Elbow Extension: 5/5      Assessment  Body structures, Functions, Activity limitations: Decreased functional mobility , Decreased ADL status, Decreased ROM, Decreased strength, Decreased high-level IADLs, Decreased coordination, Increased pain, Decreased posture  Assessment: Pt to benefit from ther ex for B/L shoulder ROM, strength, and postural control. The pt has difficulties sleeping and performing everday tasks due to pain in B/L shoulders. Pt to benefit from ergonomic education and improved body mechanics when lifting objects during the day to avoid impingment symptoms. Pt to benefit from soft tissue and joint mobility to increase the ROM at B/L shoulders. Prognosis: Good  Decision Making: Medium Complexity  History: HBP, asthma, B/L TKA  Exam: UEFS= 59/80    Clinical Presentation:  [] Stable/Uncomplicated  [x] Evolving [] Unstable/Unpredictable  The Following Comorbities will impact the patients progression and Plan of Care:   [] Diabetes    [] Stroke  [x] Cardiac Disease/Pacemaker [x] Previous Orthopedic Injury/Surgery  [] Seizure Disorder   [] WB Restrictions  [] Obesity    [] Neuropathy     Activity Tolerance: Patient Tolerated treatment well    Education: POC; HEP; table flexion stretch, scapular retraction; Workplace ergonomics; pillow placement during sleep     Barriers to Learning: none    Goals  Short term goals  Time Frame for Short term goals: 9 visits  Short term goal 1: Pt to demonstrate compliance and independence of HEP for shoulder flexibility and scapular stabilization.     Long term goals  Time Frame for Long term goals : 18 visits  Long term goal 1: Pt to report no sleep disturbances due to pain for 2 consecutive days. Long term goal 2: Pt to demonstrate >130deg shoulder flexion to improve ability of self care tasks. Long term goal 3: Pt to score >70/80 on UEFS to improve functional capacity. Long term goal 4: Pt to demonstrate L shoulder ER >60deg and R shoulder IR >60deg to aide with donning coat. Long term goal 5: Pt to demonstrate 3rd to 4th shelf raise with 4# for improved ability of taking dishware out of cabinets. Patient's Goal:    Decrease pain; put on coat    Timed Code Treatment Minutes: 10 Minutes  Total Treatment Time: 50  Time In: 3232  Time Out: [address]za, KEITH/Directly Supervised By: Alee Blunt.  Alessandro, PT  1/18/2021

## 2021-01-18 NOTE — PLAN OF CARE
[facility] DILLAN TRUJILLO    Phone: [phone]   Date: 2021                    Outpatient Physical Therapy Fax: [phone]    ACCT #: [de-identified]                       Plan of Care  Patient: Jeet Barnes  : 1960    Referring Practitioner: Dr. Sue Sorto    Referral Date : 21    Diagnosis: Primary Osteoarthritis, R and L shoulder  Onset Date: 21  Treatment Diagnosis: B/L Shoulder pain    Assessment  Body structures, Functions, Activity limitations: Decreased functional mobility , Decreased ADL status, Decreased ROM, Decreased strength, Decreased high-level IADLs, Decreased coordination, Increased pain, Decreased posture  Assessment: Pt to benefit from ther ex for B/L shoulder ROM, strength, and postural control. The pt has difficulties sleeping and performing everday tasks due to pain in B/L shoulders. Pt to benefit from ergonomic education and improved body mechanics when lifting objects during the day to avoid impingment symptoms. Pt to benefit from soft tissue and joint mobility to increase the ROM at B/L shoulders. Prognosis: Good    Treatment Plan     times per week   weeks Total # of Visits Approved: 18  [x] HP/CP     [] Electrical Stimulation   [x]  Therapeutic Exercise   []  Gait Training  [] Traction   [] Ultrasound                   []  Manual Therapy: Dry Needling                      []  Work Conditioning   [] Aquatics  [x] Back Education            []  Therapeutic Activity [x]  Manual Therapy  [x]  Patient Education/HEP []  Anodyne Therapy     Goals  Short term goals  Time Frame for Short term goals: 9 visits  Short term goal 1: Pt to demonstrate compliance and independence of HEP for shoulder flexibility and scapular stabilization. Long term goals  Time Frame for Long term goals : 18 visits  Long term goal 1: Pt to report no sleep disturbances due to pain for 2 consecutive days.   Long term goal 2: Pt to demonstrate >130deg shoulder flexion to improve ability of self care tasks. Long term goal 3: Pt to score >70/80 on UEFS to improve functional capacity. Long term goal 4: Pt to demonstrate L shoulder ER >60deg and R shoulder IR >60deg to aide with donning coat. Long term goal 5: Pt to demonstrate 3rd to 4th shelf raise with 4# for improved ability of taking dishware out of cabinets. Viri Metz,SPT/Directly Supervised By: Rossy Abernathy. Alessandro, PT      Date: 1/18/2021    ______________________________________ Date: 1/18/2021  Physician Signature    By signing above or cosigning electronically, I have reviewed this [address] [place] [address] and certify a need for medically necessary rehabilitation services.

## 2021-01-20 ENCOUNTER — APPOINTMENT (OUTPATIENT)
Dept: PHYSICAL THERAPY | Age: 61
End: 2021-01-20
Payer: COMMERCIAL

## 2021-01-28 ENCOUNTER — HOSPITAL ENCOUNTER (OUTPATIENT)
Dept: PHYSICAL THERAPY | Age: 61
Setting detail: THERAPIES SERIES
Discharge: HOME | End: 2021-01-28
Payer: COMMERCIAL

## 2021-01-28 PROCEDURE — 97140 MANUAL THERAPY 1/> REGIONS: CPT

## 2021-01-28 PROCEDURE — 97110 THERAPEUTIC EXERCISES: CPT

## 2021-01-28 ASSESSMENT — PAIN SCALES - GENERAL: PAINLEVEL_OUTOF10: 3

## 2021-01-28 NOTE — PROGRESS NOTES
donning coat. Long term goal 5: Pt to demonstrate 3rd to 4th shelf raise with 4# for improved ability of taking dishware out of cabinets.     Post Treatment Pain:  1/10    Time In: 1615    Time Out : 1700   Timed Code Treatment Minutes: 45 Minutes  Total Treatment Time: [address] [place], [place]     Date: 1/28/2021

## 2021-01-29 ENCOUNTER — HOSPITAL ENCOUNTER (OUTPATIENT)
Dept: PHYSICAL THERAPY | Age: 61
Setting detail: THERAPIES SERIES
Discharge: HOME | End: 2021-01-29
Payer: COMMERCIAL

## 2021-01-29 PROCEDURE — 97140 MANUAL THERAPY 1/> REGIONS: CPT

## 2021-01-29 PROCEDURE — 97110 THERAPEUTIC EXERCISES: CPT

## 2021-01-29 NOTE — PROGRESS NOTES
Phone: [address]           Fax: [phone]                            Outpatient Physical Therapy                                                                            Daily Note    Date: 2021  Patient Name: Adelina Renteria        MRN: [id number]   ACCT#:  [de-identified]  : 1960  (64 y.o.)    Referring Practitioner: Dr. Manus Dubin    Referral Date : 21    Diagnosis: Primary Osteoarthritis, R and L shoulder  Treatment Diagnosis: B/L Shoulder pain    Onset Date: 21  PT Insurance Information: PreAuth $[id number]  Total # of Visits Approved: 18 Per Physician Order  Total # of Visits to Date: 3  No Show: 0  Canceled Appointment: 0    Pre-Treatment Pain:  0/10     Assessment  Assessment: Pt reports no pain in B/L shoulders at the beginning of session. Pt reports taking celebrix which has helped with the pain significantly. Pt instructed through exercises with some compliants of clicking and popping during overhead motions, initiated shoulder perturbation exercises to recruit RTC stabilization. Added RTC stabilization exercise to his HEP. R shoulder shows inferior GH joint stiffness and some irritability with posterior shoulder mob, L shoulder inferior hypomobility and posterior capsule tightness noted. Continue with postural exercises and shoulder stabilization and strengthening exercises. Pt exits the clinic with no complaints of shoulder pain and no further questions. Plan  Plan: Continue with current plan    Exercises/Modalities/Manual:  See DocFlow Sheet    Education: HEP; RTC stability exercise     Barriers to Learning: none    Goals  (Total # of Visits to Date: 3)   Short Term Goals - Time Frame for Short term goals: 9 visits  Short term goal 1: Pt to demonstrate compliance and independence of HEP for shoulder flexibility and scapular stabilization.      Long Term Goals - Time Frame for Long term goals : 18 visits  Long term goal 1: Pt to report no sleep disturbances due to pain for 2 consecutive days. Long term goal 2: Pt to demonstrate >130deg shoulder flexion to improve ability of self care tasks. Long term goal 3: Pt to score >70/80 on UEFS to improve functional capacity. Long term goal 4: Pt to demonstrate L shoulder ER >60deg and R shoulder IR >60deg to aide with donning coat. Long term goal 5: Pt to demonstrate 3rd to 4th shelf raise with 4# for improved ability of taking dishware out of cabinets. Post Treatment Pain:  0/10    Time In: 1358    Time Out : 1645   Timed Code Treatment Minutes: 52 Minutes  Total Treatment Time: 2001 KEITH Wolff /Directly Supervised byDalila Francois, PT     Date: 1/29/2021

## 2021-02-03 ENCOUNTER — HOSPITAL ENCOUNTER (OUTPATIENT)
Dept: PHYSICAL THERAPY | Age: 61
Setting detail: THERAPIES SERIES
Discharge: HOME | End: 2021-02-03
Payer: COMMERCIAL

## 2021-02-03 PROCEDURE — 97140 MANUAL THERAPY 1/> REGIONS: CPT

## 2021-02-03 PROCEDURE — 97110 THERAPEUTIC EXERCISES: CPT

## 2021-02-03 ASSESSMENT — PAIN SCALES - GENERAL: PAINLEVEL_OUTOF10: 3

## 2021-02-03 NOTE — PROGRESS NOTES
Phone: [address]      Fax: [phone]                            Outpatient Physical Therapy                                                                            Daily Note    Date: 2/3/2021  Patient Name: Lulú Falcon        MRN: [id number]   ACCT#:  [de-identified]  : 1960  (64 y.o.)    Referring Practitioner: Dr. Carollynn Schaumann    Referral Date : 21    Diagnosis: Primary Osteoarthritis, R and L shoulder  Treatment Diagnosis: B/L Shoulder pain    Onset Date: 21  PT Insurance Information: PreAuth $[id number]  Total # of Visits Approved: 18 Per Physician Order  Total # of Visits to Date: 4  No Show: 0  Canceled Appointment: 0    Pre-Treatment Pain:  0/10     Assessment  Assessment: Patient denies B/L shoulder pain at rest or when completing tasks below shoulder height, but when raising shoulders overhead patient notes pain increases to a 3/10. Continued with exercises as outlined for shoulder strengthening and joint mobility. Added BodyBlade this afternoon, but patient had difficuly with technique. Following session patient denies pain. Chart Reviewed: Yes    Plan  Plan: Continue with current plan    Exercises/Modalities/Manual:  See DocFlow Sheet    Education:      Barriers to Learning: none    Goals  (Total # of Visits to Date: 4)   Short Term Goals - Time Frame for Short term goals: 9 visits  Short term goal 1: Pt to demonstrate compliance and independence of HEP for shoulder flexibility and scapular stabilization. Long Term Goals - Time Frame for Long term goals : 18 visits  Long term goal 1: Pt to report no sleep disturbances due to pain for 2 consecutive days. Long term goal 2: Pt to demonstrate >130deg shoulder flexion to improve ability of self care tasks. Long term goal 3: Pt to score >70/80 on UEFS to improve functional capacity. Long term goal 4: Pt to demonstrate L shoulder ER >60deg and R shoulder IR >60deg to aide with donning coat.   Long term goal 5: Pt to demonstrate 3rd to 4th shelf raise with 4# for improved ability of taking dishware out of cabinets.     Post Treatment Pain:  0/10    Time In: 1615    Time Out : 1700   Timed Code Treatment Minutes: 45 Minutes  Total Treatment Time: 39 Minutes    Natasha Mcgregor     Date: 2/3/2021

## 2021-02-04 ENCOUNTER — HOSPITAL ENCOUNTER (OUTPATIENT)
Dept: PHYSICAL THERAPY | Age: 61
Setting detail: THERAPIES SERIES
Discharge: HOME | End: 2021-02-04
Payer: COMMERCIAL

## 2021-02-04 PROCEDURE — 97140 MANUAL THERAPY 1/> REGIONS: CPT

## 2021-02-04 PROCEDURE — 97110 THERAPEUTIC EXERCISES: CPT

## 2021-02-04 NOTE — PROGRESS NOTES
Phone: [phone]      Fax: [phone]                            Outpatient Physical Therapy                                                                            Daily Note    Date: 2021  Patient Name: Dixon Franco        MRN: [id number]   ACCT#:  [de-identified]  : 1960  (64 y.o.)    Referring Practitioner: Dr. Cristóbal Perez    Referral Date : 21    Diagnosis: Primary Osteoarthritis, R and L shoulder  Treatment Diagnosis: B/L Shoulder pain    Onset Date: 21  PT Insurance Information: PreAuth $60copay  Total # of Visits Approved: 18 Per Physician Order  Total # of Visits to Date: 5  No Show: 0  Canceled Appointment: 0    Pre-Treatment Pain:  3/10     Assessment  Assessment: Patient states B/L shoulder pain is a 3/10 when reaching overhead, but denies pain at rest. Completed ex and manual as outlined. R shoulder AROM flex = 135 deg and L = 120 deg. L shoulder PROM ER = 58 deg. Educated patient on HEP and issued tbands for resistance. Will hold at this time pending insurance approval for additional visits.   Chart Reviewed: Yes    Plan  Plan: Hold pending MD assessment(Hold pending insurance approval for more visits)    Exercises/Modalities/Manual:  See DocFlow Sheet    Education:      Barriers to Learning: none    Goals  (Total # of Visits to Date: 5)   Short Term Goals - Time Frame for Short term goals: 9 visits  Short term goal 1: Pt to demonstrate compliance and independence of HEP for shoulder flexibility and scapular stabilization. - MET    Long Term Goals - Time Frame for Long term goals : 18 visits  Long term goal 1: Pt to report no sleep disturbances due to pain for 2 consecutive days. - NOT MET  Long term goal 2: Pt to demonstrate >130deg shoulder flexion to improve ability of self care tasks. - NOT MET  Long term goal 3: Pt to score >70/80 on UEFS to improve functional capacity. - NOT MET  Long term goal 4: Pt to demonstrate L shoulder ER >60deg and R shoulder IR >60deg to aide with donning coat. - NOT MET  Long term goal 5: Pt to demonstrate 3rd to 4th shelf raise with 4# for improved ability of taking dishware out of cabinets. - NOT MET    Post Treatment Pain:  3/10    Time In: 1520    Time Out : 1605   Timed Code Treatment Minutes: 45 Minutes  Total Treatment Time: [address] [place] [address]     Date: 2/4/2021

## 2021-02-09 ENCOUNTER — HOSPITAL ENCOUNTER (OUTPATIENT)
Dept: PHYSICAL THERAPY | Age: 61
Setting detail: THERAPIES SERIES
Discharge: HOME | End: 2021-02-09
Payer: COMMERCIAL

## 2021-02-09 PROCEDURE — 97140 MANUAL THERAPY 1/> REGIONS: CPT

## 2021-02-09 PROCEDURE — 97110 THERAPEUTIC EXERCISES: CPT

## 2021-02-09 ASSESSMENT — PAIN SCALES - GENERAL: PAINLEVEL_OUTOF10: 3

## 2021-02-09 NOTE — PROGRESS NOTES
Phone: Leela Barth      Fax: [phone]                            Outpatient Physical Therapy                                                                            Daily Note    Date: 2021  Patient Name: Marce Rodriguez        MRN: [id number]   ACCT#:  [de-identified]  : 1960  (64 y.o.)    Referring Practitioner: Dr. Senait Mendez    Referral Date : 21    Diagnosis: Primary Osteoarthritis, R and L shoulder  Treatment Diagnosis: B/L Shoulder pain    Onset Date: 21  PT Insurance Information: [employer] $[id number]  Total # of Visits Approved: 18 Per Physician Order  Total # of Visits to Date: 6  No Show: 0  Canceled Appointment: 0    Pre-Treatment Pain:  3/10     Assessment  Assessment: Patient denies B/L shoulder pain at rest, but increases to a 3/10 when overhead or shoulder height. Completed exercises and manual as outlined for strengthening and joint mobility. Plan to continue x1 visit and then D/C to HEP. Will have patient fill out UEFS next visit.   Chart Reviewed: Yes    Plan  Plan: Continue with current plan    Exercises/Modalities/Manual:  See DocFlow Sheet    Education:      Barriers to Learning: none    Goals  (Total # of Visits to Date: 6)   Short Term Goals - Time Frame for Short term goals: 9 visits  Short term goal 1: Pt to demonstrate compliance and independence of HEP for shoulder flexibility and scapular stabilization. - MET    Long Term Goals - Time Frame for Long term goals : 18 visits  Long term goal 1: Pt to report no sleep disturbances due to pain for 2 consecutive days. - NOT MET  Long term goal 2: Pt to demonstrate >130deg shoulder flexion to improve ability of self care tasks. - NOT MET  Long term goal 3: Pt to score >70/80 on UEFS to improve functional capacity. - NOT MET  Long term goal 4: Pt to demonstrate L shoulder ER >60deg and R shoulder IR >60deg to aide with donning coat. - NOT MET  Long term goal 5: Pt to demonstrate 3rd to 4th

## 2021-02-11 ENCOUNTER — HOSPITAL ENCOUNTER (OUTPATIENT)
Dept: PHYSICAL THERAPY | Age: 61
Setting detail: THERAPIES SERIES
Discharge: HOME | End: 2021-02-11
Payer: COMMERCIAL

## 2021-02-11 PROCEDURE — 97140 MANUAL THERAPY 1/> REGIONS: CPT

## 2021-02-11 PROCEDURE — 97110 THERAPEUTIC EXERCISES: CPT

## 2021-02-11 NOTE — PROGRESS NOTES
improved ability of taking dishware out of cabinets.  - MET    Post Treatment Pain:  0/10 at rest    Time In: 1535    Time Out : 1620   Timed Code Treatment Minutes: 45 Minutes  Total Treatment Time: [address] [place], [place]     Date: 2/11/2021

## 2021-03-10 ENCOUNTER — TELEPHONE (OUTPATIENT)
Dept: FAMILY MEDICINE CLINIC | Age: 61
End: 2021-03-10

## 2021-03-10 ENCOUNTER — HOSPITAL ENCOUNTER (OUTPATIENT)
Dept: PREADMISSION TESTING | Age: 61
Setting detail: SPECIMEN
Discharge: HOME | End: 2021-03-10
Payer: COMMERCIAL

## 2021-03-10 DIAGNOSIS — Z20.822 SUSPECTED COVID-19 VIRUS INFECTION: ICD-10-CM

## 2021-03-10 DIAGNOSIS — Z20.822 EXPOSURE TO COVID-19 VIRUS: Primary | ICD-10-CM

## 2021-03-10 PROCEDURE — U0003 INFECTIOUS AGENT DETECTION BY NUCLEIC ACID (DNA OR RNA); SEVERE ACUTE RESPIRATORY SYNDROME CORONAVIRUS 2 (SARS-COV-2) (CORONAVIRUS DISEASE [COVID-19]), AMPLIFIED PROBE TECHNIQUE, MAKING USE OF HIGH THROUGHPUT TECHNOLOGIES AS DESCRIBED BY CMS-2020-01-R: HCPCS

## 2021-03-10 PROCEDURE — C9803 HOPD COVID-19 SPEC COLLECT: HCPCS

## 2021-03-10 PROCEDURE — U0005 INFEC AGEN DETEC AMPLI PROBE: HCPCS

## 2021-03-10 NOTE — TELEPHONE ENCOUNTER
Patient was exposed to covid over the weekend. His son-n-law tested positive on Monday. Derek Rachel is calling today because he started with symptoms last evening. He is c/o low grade fever 99.3-99.5 and sinus drainage. He states he usually has allergies this time of year but he doesn't want to take any chances. Can he get a covid test?  Please let patient know.     Derek Rachel [phone]    Health Maintenance   Topic Date Due    Hepatitis C screen  Never done    HIV screen  Never done    DTaP/Tdap/Td vaccine (1 - Tdap) Never done    Shingles Vaccine (1 of 2) Never done    A1C test (Diabetic or Prediabetic)  08/31/2019    Flu vaccine (1) 09/01/2020    Potassium monitoring  09/11/2021    Creatinine monitoring  09/11/2021    Lipid screen  09/11/2025    Colon cancer screen colonoscopy  07/11/2026    Hepatitis A vaccine  Aged Out    Hepatitis B vaccine  Aged Out    Hib vaccine  Aged Out    Meningococcal (ACWY) vaccine  Aged Out             (applicable per patient's age: Cancer Screenings, Depression Screening, Fall Risk Screening, Immunizations)    Hemoglobin A1C (%)   Date Value   08/31/2018 5.8   08/18/2017 5.7     LDL Cholesterol (mg/dL)   Date Value   09/11/2020 122     LDL Calculated (mg/dL)   Date Value   08/18/2017 160     AST (U/L)   Date Value   09/11/2020 16     ALT (U/L)   Date Value   09/11/2020 22     BUN (mg/dL)   Date Value   09/11/2020 16      (goal A1C is < 7)   (goal LDL is <100) need 30-50% reduction from baseline     BP Readings from Last 3 Encounters:   09/11/20 134/80   03/13/20 128/84   01/31/20 131/78    (goal /80)      All Future Testing planned in [facility]:  Lab Frequency Next Occurrence       Next Visit Date:  Future Appointments   Date Time Provider Cassandra Wan   3/12/2021  7:00 AM DO JACKIE Guthrie [facility]            Patient Active Problem List:     HTN (hypertension)     GERD (gastroesophageal reflux disease)     Seasonal allergies     Pure hypercholesterolemia     Arthritis     Environmental and seasonal allergies

## 2021-03-11 ENCOUNTER — TELEPHONE (OUTPATIENT)
Dept: FAMILY MEDICINE CLINIC | Age: 61
End: 2021-03-11

## 2021-03-11 LAB
SARS-COV-2: ABNORMAL
SARS-COV-2: DETECTED
SOURCE: ABNORMAL

## 2021-03-11 NOTE — TELEPHONE ENCOUNTER
Patient has an appt on 3/12/21. He was tested for covid on 3/10/21. He is wondering if he can make the appt a phone call? He will be out of his BP meds by Monday. Please advise.     Health Maintenance   Topic Date Due    Hepatitis C screen  Never done    HIV screen  Never done    COVID-19 Vaccine (1 of 2) Never done    DTaP/Tdap/Td vaccine (1 - Tdap) Never done    Shingles Vaccine (1 of 2) Never done    A1C test (Diabetic or Prediabetic)  08/31/2019    Flu vaccine (1) 09/01/2020    Potassium monitoring  09/11/2021    Creatinine monitoring  09/11/2021    Lipid screen  09/11/2025    Colon cancer screen colonoscopy  07/11/2026    Hepatitis A vaccine  Aged Out    Hepatitis B vaccine  Aged Out    Hib vaccine  Aged Out    Meningococcal (ACWY) vaccine  Aged Out             (applicable per patient's age: Cancer Screenings, Depression Screening, Fall Risk Screening, Immunizations)    Hemoglobin A1C (%)   Date Value   08/31/2018 5.8   08/18/2017 5.7     LDL Cholesterol (mg/dL)   Date Value   09/11/2020 122     LDL Calculated (mg/dL)   Date Value   08/18/2017 160     AST (U/L)   Date Value   09/11/2020 16     ALT (U/L)   Date Value   09/11/2020 22     BUN (mg/dL)   Date Value   09/11/2020 16      (goal A1C is < 7)   (goal LDL is <100) need 30-50% reduction from baseline     BP Readings from Last 3 Encounters:   09/11/20 134/80   03/13/20 128/84   01/31/20 131/78    (goal /80)      All Future Testing planned in [facility]:  Lab Frequency Next Occurrence   COVID-19 Once 03/10/2021       Next Visit Date:  Future Appointments   Date Time Provider Cassandra Wan   3/12/2021  7:00 AM DO JACKIE Lewis W            Patient Active Problem List:     HTN (hypertension)     GERD (gastroesophageal reflux disease)     Seasonal allergies     Pure hypercholesterolemia     Arthritis     Environmental and seasonal allergies

## 2021-03-11 NOTE — TELEPHONE ENCOUNTER
Yes, video if poss but phone ok as long as he has some home bp readings for us.  Otherwise can resched and we can still give refills

## 2021-03-12 ENCOUNTER — TELEMEDICINE (OUTPATIENT)
Dept: FAMILY MEDICINE CLINIC | Age: 61
End: 2021-03-12
Payer: COMMERCIAL

## 2021-03-12 DIAGNOSIS — I10 ESSENTIAL HYPERTENSION: ICD-10-CM

## 2021-03-12 DIAGNOSIS — U07.1 COVID-19: Primary | ICD-10-CM

## 2021-03-12 PROCEDURE — 99442 PR PHYS/QHP TELEPHONE EVALUATION 11-20 MIN: CPT | Performed by: FAMILY MEDICINE

## 2021-03-12 RX ORDER — ALBUTEROL SULFATE 90 UG/1
2 AEROSOL, METERED RESPIRATORY (INHALATION) EVERY 4 HOURS PRN
Qty: 1 INHALER | Refills: 0 | Status: CANCELLED | OUTPATIENT
Start: 2021-03-12

## 2021-03-12 RX ORDER — ASPIRIN 81 MG/1
81 TABLET ORAL DAILY
Qty: 90 TABLET | Refills: 3 | Status: CANCELLED | OUTPATIENT
Start: 2021-03-12

## 2021-03-12 RX ORDER — LOSARTAN POTASSIUM 100 MG/1
100 TABLET ORAL DAILY
Qty: 90 TABLET | Refills: 1 | Status: SHIPPED | OUTPATIENT
Start: 2021-03-12 | End: 2021-09-01 | Stop reason: SDUPTHER

## 2021-03-12 RX ORDER — HYDROCHLOROTHIAZIDE 25 MG/1
25 TABLET ORAL DAILY
Qty: 90 TABLET | Refills: 1 | Status: SHIPPED | OUTPATIENT
Start: 2021-03-12 | End: 2021-09-01 | Stop reason: SDUPTHER

## 2021-03-12 ASSESSMENT — PATIENT HEALTH QUESTIONNAIRE - PHQ9
SUM OF ALL RESPONSES TO PHQ QUESTIONS 1-9: 0
1. LITTLE INTEREST OR PLEASURE IN DOING THINGS: 0
SUM OF ALL RESPONSES TO PHQ9 QUESTIONS 1 & 2: 0
2. FEELING DOWN, DEPRESSED OR HOPELESS: 0
SUM OF ALL RESPONSES TO PHQ QUESTIONS 1-9: 0

## 2021-03-12 NOTE — PROGRESS NOTES
Renzo Gregory is a 64 y.o. male evaluated via telephone on 3/12/2021. Consent:  He and/or health care decision maker is aware that that he may receive a bill for this telephone service, depending on his insurance coverage, and has provided verbal consent to proceed: Yes      Documentation:  I communicated with the patient and/or health care decision maker about covid, htn. Details of this discussion including any medical advice provided: cont current rx, get pulse ox, activity as tolerated, ER if severe SOB or o2 <90%      I affirm this is a Patient Initiated Episode with a Patient who has not had a related appointment within my department in the past 7 days or scheduled within the next 24 hours. Patient identification was verified at the start of the visit: Yes    Total Time: minutes: 11-20 minutes    The visit was conducted pursuant to the emergency declaration under the [address] [place] [address], [address] [place] [address] authority and the [employer] Act. Patient identification was verified, and a caregiver was present when appropriate. The patient was located in a state where the provider was credentialed to provide care. Note: not billable if this call serves to triage the patient into an appointment for the relevant concern      [address], started getting sick 3 days ago with dry, nagging cough, seemed similar to previous allergy trouble. Fever the next morning on waking. Was tested that day. Has developed some body aches and diarrhea (not severe, 3-4x/day). Sore throat and aching of anterior neck. Cough has improved. Taking tylenol for fevers. On vit c, d, and zinc.     F/u HTN. 139/89, pulse 87. Feeling well prior to COVID infection. Taking meds as prescribed, no adverse effects. negro shoulder pain, dx arthritis by ortho whom he was seeing for h/o negro TKA. Went through PT which helped.  Plans to re-eval shoulders every year at time of knee checks and will see if he's developing jagged edges in the shoulders, which he didn't have at this point based on xrays. Allergic rhinitis well controlled. Has only had to use albuterol once or twice in the past several months.

## 2021-03-12 NOTE — PATIENT INSTRUCTIONS
SURVEY:    You may be receiving a survey from [employer] regarding your visit today. You may get this in the mail, through your MyChart or in your email. Please complete the survey to enable us to provide the highest quality of care to you and your family. If you cannot score us as very good ( 5 Stars) on any question, please feel free to call the office to discuss how we could have made your experience exceptional.     Thank you.     Clinical Care Team:  Dr. Alex Cárdenas,                                            [facility], [address] [place] [address] Team:  [address] [place] [address]

## 2021-03-16 ENCOUNTER — TELEPHONE (OUTPATIENT)
Dept: FAMILY MEDICINE CLINIC | Age: 61
End: 2021-03-16

## 2021-03-16 RX ORDER — DEXAMETHASONE 6 MG/1
6 TABLET ORAL 2 TIMES DAILY WITH MEALS
Qty: 10 TABLET | Refills: 0 | Status: SHIPPED | OUTPATIENT
Start: 2021-03-16 | End: 2021-03-21

## 2021-03-16 NOTE — TELEPHONE ENCOUNTER
If patient is agreeable, I would recommend he go to [facility] for antibody treatment. Meets criteria with age over 54, hypertension, and BMI over 35. There is a risk of anaphylaxis with the treatment but the risk is low. There is some evidence that the antibody treatment can help with symptoms and shorten the length of illness. If he is agreeable I will order.

## 2021-03-16 NOTE — TELEPHONE ENCOUNTER
Patient concerned with his covid symptoms - has had a fever for 7 straight days - now seems to be going to his chest - did have a pretty significant cough while I was talking with him - asking for someone to call him    Health Maintenance   Topic Date Due    Hepatitis C screen  Never done    HIV screen  Never done    COVID-19 Vaccine (1) Never done    DTaP/Tdap/Td vaccine (1 - Tdap) Never done    Shingles Vaccine (1 of 2) Never done    A1C test (Diabetic or Prediabetic)  08/31/2019    Flu vaccine (1) 09/01/2020    Potassium monitoring  09/11/2021    Creatinine monitoring  09/11/2021    Lipid screen  09/11/2025    Colon cancer screen colonoscopy  07/11/2026    Hepatitis A vaccine  Aged Out    Hepatitis B vaccine  Aged Out    Hib vaccine  Aged Out    Meningococcal (ACWY) vaccine  Aged Out             (applicable per patient's age: Cancer Screenings, Depression Screening, Fall Risk Screening, Immunizations)    Hemoglobin A1C (%)   Date Value   08/31/2018 5.8   08/18/2017 5.7     LDL Cholesterol (mg/dL)   Date Value   09/11/2020 122     LDL Calculated (mg/dL)   Date Value   08/18/2017 160     AST (U/L)   Date Value   09/11/2020 16     ALT (U/L)   Date Value   09/11/2020 22     BUN (mg/dL)   Date Value   09/11/2020 16      (goal A1C is < 7)   (goal LDL is <100) need 30-50% reduction from baseline     BP Readings from Last 3 Encounters:   09/11/20 134/80   03/13/20 128/84   01/31/20 131/78    (goal /80)      All Future Testing planned in [facility]:  Lab Frequency Next Occurrence   COVID-19 Once 03/10/2021       Next Visit Date:  No future appointments.          Patient Active Problem List:     HTN (hypertension)     GERD (gastroesophageal reflux disease)     Seasonal allergies     Pure hypercholesterolemia     Arthritis     Environmental and seasonal allergies

## 2021-03-16 NOTE — TELEPHONE ENCOUNTER
91 to 92% pulse ox. Having a cough that worsened a lot last night. Fever was 100.3 this morning. Was 101.6 with ibup/ tylenol last night. Fever never breaks completely. Has some sinus drainage, occasional diarrhea and body aches.

## 2021-03-18 ENCOUNTER — TELEPHONE (OUTPATIENT)
Dept: FAMILY MEDICINE CLINIC | Age: 61
End: 2021-03-18

## 2021-03-18 NOTE — TELEPHONE ENCOUNTER
Patient states that he got the monoclonal antibody infusion  This morning. He was doing well but is now having flushing and redness around his shoulders and face. States that he is having some swelling under his eyes. Patient advised to try benadryl and monitor sx closely. Should sx get worse or he has any concerns with breathing or swallowing he is to have someone take him to the ER immediately. Patient verbalized understanding      Last visit:  3/12/2021  Next Visit Date:  No future appointments. Medication List:  Prior to Admission medications    Medication Sig Start Date End Date Taking?  Authorizing Provider   dexamethasone (DECADRON) 6 MG tablet Take 1 tablet by mouth 2 times daily (with meals) for 5 days 3/16/21 3/21/21  Wade Smart DO   hydroCHLOROthiazide (HYDRODIURIL) 25 MG tablet Take 1 tablet by mouth daily 3/12/21   Wade Smart DO   losartan (COZAAR) 100 MG tablet Take 1 tablet by mouth daily 3/12/21   Wade Smart DO   aspirin EC 81 MG EC tablet Take 1 tablet by mouth daily 9/11/20   Wade Smart DO   Omega-3 Fatty Acids (FISH OIL) 1200 MG CPDR Take by mouth    Historical Provider, MD   GARLIC PO Take by mouth    Historical Provider, MD   Loratadine (CLARITIN PO) Take by mouth    Historical Provider, MD   montelukast (SINGULAIR) 10 MG tablet Take 1 tablet by mouth nightly 2/6/19   Ady Barbour MD   fluticasone [facility] ALLERGY RELIEF) 50 MCG/ACT nasal spray 1 spray by Nasal route daily 3/9/18   Wade Smart DO

## 2021-03-23 ENCOUNTER — TELEPHONE (OUTPATIENT)
Dept: FAMILY MEDICINE CLINIC | Age: 61
End: 2021-03-23

## 2021-03-23 NOTE — TELEPHONE ENCOUNTER
This happens to just about everybody with Covid. It is viral and does not improve with antibiotics unfortunately. May use Mucinex to help thin out secretions, make sure to continue very good water intake, and just give it time. I am glad that his fever and oxygen saturation have improved.

## 2021-03-29 ENCOUNTER — HOSPITAL ENCOUNTER (OUTPATIENT)
Dept: GENERAL RADIOLOGY | Age: 61
Discharge: HOME | End: 2021-03-31
Payer: COMMERCIAL

## 2021-03-29 ENCOUNTER — HOSPITAL ENCOUNTER (OUTPATIENT)
Age: 61
Discharge: HOME | End: 2021-03-31
Payer: COMMERCIAL

## 2021-03-29 ENCOUNTER — OFFICE VISIT (OUTPATIENT)
Dept: FAMILY MEDICINE CLINIC | Age: 61
End: 2021-03-29
Payer: COMMERCIAL

## 2021-03-29 ENCOUNTER — HOSPITAL ENCOUNTER (OUTPATIENT)
Age: 61
Discharge: HOME | End: 2021-03-29
Payer: COMMERCIAL

## 2021-03-29 VITALS
BODY MASS INDEX: 36.51 KG/M2 | HEIGHT: 70 IN | HEART RATE: 121 BPM | DIASTOLIC BLOOD PRESSURE: 84 MMHG | TEMPERATURE: 99.6 F | SYSTOLIC BLOOD PRESSURE: 130 MMHG | OXYGEN SATURATION: 99 % | WEIGHT: 255 LBS

## 2021-03-29 DIAGNOSIS — U07.1 COVID-19: ICD-10-CM

## 2021-03-29 DIAGNOSIS — U07.1 COVID-19: Primary | ICD-10-CM

## 2021-03-29 DIAGNOSIS — R07.9 RIGHT-SIDED CHEST PAIN: ICD-10-CM

## 2021-03-29 LAB
ABSOLUTE EOS #: 0.1 K/UL (ref 0–0.4)
ABSOLUTE IMMATURE GRANULOCYTE: ABNORMAL K/UL (ref 0–0.3)
ABSOLUTE LYMPH #: 1.2 K/UL (ref 1–4.8)
ABSOLUTE MONO #: 1 K/UL (ref 0–1)
ALBUMIN SERPL-MCNC: 4.1 G/DL (ref 3.5–5.2)
ALBUMIN/GLOBULIN RATIO: ABNORMAL (ref 1–2.5)
ALP BLD-CCNC: 86 U/L (ref 40–129)
ALT SERPL-CCNC: 33 U/L (ref 5–41)
ANION GAP SERPL CALCULATED.3IONS-SCNC: 12 MMOL/L (ref 9–17)
AST SERPL-CCNC: 13 U/L
BASOPHILS # BLD: 1 % (ref 0–2)
BASOPHILS ABSOLUTE: 0.1 K/UL (ref 0–0.2)
BILIRUB SERPL-MCNC: 0.63 MG/DL (ref 0.3–1.2)
BUN BLDV-MCNC: 13 MG/DL (ref 6–20)
BUN/CREAT BLD: 16 (ref 9–20)
CALCIUM SERPL-MCNC: 9.9 MG/DL (ref 8.6–10.4)
CHLORIDE BLD-SCNC: 104 MMOL/L (ref 98–107)
CO2: 24 MMOL/L (ref 20–31)
CREAT SERPL-MCNC: 0.82 MG/DL (ref 0.7–1.2)
D-DIMER QUANTITATIVE: 0.5 MG/L FEU (ref 0–0.59)
DIFFERENTIAL TYPE: YES
EOSINOPHILS RELATIVE PERCENT: 1 % (ref 0–5)
GFR AFRICAN AMERICAN: >60 ML/MIN
GFR NON-AFRICAN AMERICAN: >60 ML/MIN
GFR SERPL CREATININE-BSD FRML MDRD: ABNORMAL ML/MIN/{1.73_M2}
GFR SERPL CREATININE-BSD FRML MDRD: ABNORMAL ML/MIN/{1.73_M2}
GLUCOSE BLD-MCNC: 108 MG/DL (ref 70–99)
HCT VFR BLD CALC: 48.6 % (ref 41–53)
HEMOGLOBIN: 16.5 G/DL (ref 13.5–17.5)
IMMATURE GRANULOCYTES: ABNORMAL %
LYMPHOCYTES # BLD: 13 % (ref 13–44)
MCH RBC QN AUTO: 29.6 PG (ref 26–34)
MCHC RBC AUTO-ENTMCNC: 33.9 G/DL (ref 31–37)
MCV RBC AUTO: 87.4 FL (ref 80–100)
MONOCYTES # BLD: 11 % (ref 5–9)
NRBC AUTOMATED: ABNORMAL PER 100 WBC
PDW BLD-RTO: 13.2 % (ref 12.1–15.2)
PLATELET # BLD: 301 K/UL (ref 140–450)
PLATELET ESTIMATE: ABNORMAL
PMV BLD AUTO: ABNORMAL FL (ref 6–12)
POTASSIUM SERPL-SCNC: 4.1 MMOL/L (ref 3.7–5.3)
RBC # BLD: 5.57 M/UL (ref 4.5–5.9)
RBC # BLD: ABNORMAL 10*6/UL
SEG NEUTROPHILS: 74 % (ref 39–75)
SEGMENTED NEUTROPHILS ABSOLUTE COUNT: 7.1 K/UL (ref 2.1–6.5)
SODIUM BLD-SCNC: 140 MMOL/L (ref 135–144)
TOTAL PROTEIN: 7.1 G/DL (ref 6.4–8.3)
WBC # BLD: 9.5 K/UL (ref 3.5–11)
WBC # BLD: ABNORMAL 10*3/UL

## 2021-03-29 PROCEDURE — 99214 OFFICE O/P EST MOD 30 MIN: CPT | Performed by: FAMILY MEDICINE

## 2021-03-29 PROCEDURE — 36415 COLL VENOUS BLD VENIPUNCTURE: CPT

## 2021-03-29 PROCEDURE — 80053 COMPREHEN METABOLIC PANEL: CPT

## 2021-03-29 PROCEDURE — 85025 COMPLETE CBC W/AUTO DIFF WBC: CPT

## 2021-03-29 PROCEDURE — 71046 X-RAY EXAM CHEST 2 VIEWS: CPT

## 2021-03-29 PROCEDURE — 85379 FIBRIN DEGRADATION QUANT: CPT

## 2021-03-29 RX ORDER — ZINC GLUCONATE 50 MG
100 TABLET ORAL DAILY
COMMUNITY

## 2021-03-29 RX ORDER — LEVOFLOXACIN 500 MG/1
500 TABLET, FILM COATED ORAL DAILY
Qty: 7 TABLET | Refills: 0 | Status: SHIPPED | OUTPATIENT
Start: 2021-03-29 | End: 2021-04-05

## 2021-03-29 NOTE — PROGRESS NOTES
Name: Kodak Flores  : 1960         Chief Complaint:     Chief Complaint   Patient presents with    Sinus Problem     chest tightness, back pain, wheezing, coughing, drainage    Fever     restarted yesterday       History of Present Illness:      Kodak Flores is a 64 y.o.  male who presents with Sinus Problem (chest tightness, back pain, wheezing, coughing, drainage) and Fever (restarted yesterday)      HPI    Patient complains of recurrent fever shortly after Covid infection. His symptoms initially started on March 10. First wk of symptoms had fever 100-101 every day, copious sinus drainage. Day 8 lost taste and smell. Day 9 had antibody infusion. After arrival home was red from chest up and had some swelling around eyes, but no difficulty breathing. Took 2 benadryl doses, 50 mg each, and those symptoms had resolved the next morning and did not recur. No fever since antibody infusion. Pulse ox the first 3 days of symptoms was 91-93 and heart rate was  on pulse ox. A wk ago was able to go back to work and was just tired. That afternoon dry cough recurred. Sinus drainage continued. Next day started having terrible aching in R maxillary and temporal area. No fever. 2 days ago he was feeling quite well and even went outside and did yard work. Yesterday developed elevated temp again, 99-99.8. He took some motrin yesterday, none so far today. Heaviness in chest as well as pain in R posterior lower ribs. Wheezing. Continues to cough and feels just a little bit of congestion coming up, not enough even to spit out. No specific difficulty breathing. Mucinex-DM, claritin, vit D, vit C packs plus orange juice, zinc. Albuterol frequently. Uses steam to try to drain out his sinuses.      Medical History:     Patient Active Problem List   Diagnosis    HTN (hypertension)    GERD (gastroesophageal reflux disease)    Seasonal allergies    Pure hypercholesterolemia    Arthritis    Environmental and seasonal allergies       Medications:       Prior to Admission medications    Medication Sig Start Date End Date Taking? Authorizing Provider   levoFLOXacin (LEVAQUIN) 500 MG tablet Take 1 tablet by mouth daily for 7 days 3/29/21 4/5/21 Yes Macon January, DO   zinc gluconate 50 MG tablet Take 100 mg by mouth daily    Historical Provider, MD   vitamin D (CHOLECALCIFEROL) 25 MCG (1000 UT) TABS tablet Take 1,000 Units by mouth daily    Historical Provider, MD   ascorbic acid (VITAMIN C) 1000 MG tablet Take 1,000 mg by mouth daily    Historical Provider, MD   hydroCHLOROthiazide (HYDRODIURIL) 25 MG tablet Take 1 tablet by mouth daily 3/12/21   Teto January, DO   losartan (COZAAR) 100 MG tablet Take 1 tablet by mouth daily 3/12/21   [place] January, DO   aspirin EC 81 MG EC tablet Take 1 tablet by mouth daily 9/11/20 Macon January, DO   Omega-3 Fatty Acids (FISH OIL) 1200 MG CPDR Take by mouth    Historical Provider, MD   GARLIC PO Take by mouth    Historical Provider, MD   Loratadine (CLARITIN PO) Take by mouth    Historical Provider, MD   montelukast (SINGULAIR) 10 MG tablet Take 1 tablet by mouth nightly 2/6/19   Kaia Howard MD   fluticasone Dannial Brood ALLERGY RELIEF) 50 MCG/ACT nasal spray 1 spray by Nasal route daily 3/9/18   Tteo January, DO        Allergies:       Pneumococcal vaccines, Statins, and Tetracyclines & related    Review ofSystems:     Positive and Negative as described in HPI    Review of Systems    Physical Exam:     Vitals:  /84   Pulse 121   Temp 99.6 °F (37.6 °C)   Ht 5' 10\" (1.778 m)   Wt 255 lb (115.7 kg)   SpO2 99%   BMI 36.59 kg/m²   Physical Exam  Vitals signs and nursing note reviewed. Constitutional:       Appearance: He is well-developed.    HENT:      Right Ear: Hearing and tympanic membrane normal.      Left Ear: Hearing and tympanic membrane normal.      Nose: Nose normal.      Mouth/Throat:      Mouth: Mucous membranes are moist.      Dentition: Normal dentition. Pharynx: Oropharynx is clear. Eyes:      Conjunctiva/sclera: Conjunctivae normal.      Pupils: Pupils are equal, round, and reactive to light. Neck:      Thyroid: No thyroid mass or thyromegaly. Cardiovascular:      Rate and Rhythm: Regular rhythm. Tachycardia present. Heart sounds: S1 normal and S2 normal. No murmur. Pulmonary:      Effort: Pulmonary effort is normal.      Breath sounds: Normal breath sounds. No wheezing or rales. Lymphadenopathy:      Cervical: No cervical adenopathy. Skin:     General: Skin is warm and dry. Findings: No rash. Neurological:      Mental Status: He is alert and oriented to person, place, and time. Psychiatric:         Mood and Affect: Mood normal.         Behavior: Behavior normal. Behavior is cooperative. Data:     Lab Results   Component Value Date     03/29/2021    K 4.1 03/29/2021     03/29/2021    CO2 24 03/29/2021    BUN 13 03/29/2021    CREATININE 0.82 03/29/2021    GLUCOSE 108 03/29/2021    PROT 7.1 03/29/2021    LABALBU 4.1 03/29/2021    BILITOT 0.63 03/29/2021    ALKPHOS 86 03/29/2021    AST 13 03/29/2021    ALT 33 03/29/2021     Lab Results   Component Value Date    WBC 9.5 03/29/2021    RBC 5.57 03/29/2021    HGB 16.5 03/29/2021    HCT 48.6 03/29/2021    MCV 87.4 03/29/2021    MCH 29.6 03/29/2021    MCHC 33.9 03/29/2021    RDW 13.2 03/29/2021     03/29/2021    MPV NOT REPORTED 03/29/2021     Lab Results   Component Value Date    TSH 1.65 08/18/2017     Lab Results   Component Value Date    CHOL 198 09/11/2020    CHOL 229 08/18/2017    HDL 39 09/11/2020    PSA 2.32 09/11/2020    LABA1C 5.8 08/31/2018         Assessment & Plan:        Diagnosis Orders   1. COVID-19  XR CHEST STANDARD (2 VW)    CBC Auto Differential    Comprehensive Metabolic Panel    D-Dimer, Quantitative   2.  Right-sided chest pain  XR CHEST STANDARD (2 VW)    CBC Auto Differential    Comprehensive Metabolic Panel    D-Dimer, Quantitative   COVID-19 as described in HPI, onset of symptoms initially March 10, received antibody infusion on the 18th, had been doing better the last several days and then developed temp elevation and malaise again yesterday, right lower posterior thoracic pain, heaviness in the chest.  Suspect he may be developing a pneumonia, but differential diagnosis would also include PE. Testing ordered stat as above. After visit received chest x-ray result showing possible early infiltrate, is presently on the left rather than the right where he is feeling the heaviness. Levaquin prescribed for possible secondary bacterial pneumonia. Continue supportive care also. Follow-up if not improving. Requested Prescriptions     Signed Prescriptions Disp Refills    levoFLOXacin (LEVAQUIN) 500 MG tablet 7 tablet 0     Sig: Take 1 tablet by mouth daily for 7 days         Patient Instructions   SURVEY:    You may be receiving a survey from [employer] regarding your visit today. You may get this in the mail, through your MyChart or in your email. Please complete the survey to enable us to provide the highest quality of care to you and your family. If you cannot score us as very good ( 5 Stars) on any question, please feel free to call the office to discuss how we could have made your experience exceptional.     Thank you. Clinical Care Team:  Dr. Flores Drew, DO [facility], [address] [place] [address] Team:  Pratibha Ryan received counseling on the following healthy behaviors: medication adherence  Reviewed prior labs and health maintenance. Continue current medications, diet and exercise. Discussed use, benefit, and side effects of prescribed medications. Barriers to medication compliance addressed. Patient given educational materials - see patient instructions.     All patient questions answered. Patient voiced understanding.        Electronically signed by Venessa Mark DO on 3/31/2021 at 6:23 AM  [facility]  [address]  Dept: [phone]

## 2021-03-29 NOTE — PATIENT INSTRUCTIONS
SURVEY:    You may be receiving a survey from [employer] regarding your visit today. You may get this in the mail, through your MyChart or in your email. Please complete the survey to enable us to provide the highest quality of care to you and your family. If you cannot score us as very good ( 5 Stars) on any question, please feel free to call the office to discuss how we could have made your experience exceptional.     Thank you.     Clinical Care Team:  DO Shena Pimentel Ace, [address] [place] [address] Team:  [address] [place] [address]

## 2021-09-01 ENCOUNTER — OFFICE VISIT (OUTPATIENT)
Dept: FAMILY MEDICINE CLINIC | Age: 61
End: 2021-09-01
Payer: COMMERCIAL

## 2021-09-01 VITALS
HEIGHT: 70 IN | BODY MASS INDEX: 35.65 KG/M2 | OXYGEN SATURATION: 98 % | HEART RATE: 91 BPM | WEIGHT: 249 LBS | DIASTOLIC BLOOD PRESSURE: 78 MMHG | SYSTOLIC BLOOD PRESSURE: 138 MMHG

## 2021-09-01 DIAGNOSIS — I10 ESSENTIAL HYPERTENSION: Primary | ICD-10-CM

## 2021-09-01 DIAGNOSIS — Z12.5 SCREENING FOR PROSTATE CANCER: ICD-10-CM

## 2021-09-01 DIAGNOSIS — Z13.6 SCREENING FOR CARDIOVASCULAR CONDITION: ICD-10-CM

## 2021-09-01 DIAGNOSIS — Z11.59 ENCOUNTER FOR HEPATITIS C SCREENING TEST FOR LOW RISK PATIENT: ICD-10-CM

## 2021-09-01 PROCEDURE — 99213 OFFICE O/P EST LOW 20 MIN: CPT | Performed by: FAMILY MEDICINE

## 2021-09-01 RX ORDER — HYDROCHLOROTHIAZIDE 25 MG/1
25 TABLET ORAL DAILY
Qty: 90 TABLET | Refills: 1 | Status: SHIPPED | OUTPATIENT
Start: 2021-09-01 | End: 2022-03-16 | Stop reason: SDUPTHER

## 2021-09-01 RX ORDER — LOSARTAN POTASSIUM 100 MG/1
100 TABLET ORAL DAILY
Qty: 90 TABLET | Refills: 1 | Status: SHIPPED | OUTPATIENT
Start: 2021-09-01 | End: 2022-03-16 | Stop reason: SDUPTHER

## 2021-09-01 RX ORDER — ASPIRIN 81 MG/1
81 TABLET ORAL DAILY
Qty: 90 TABLET | Refills: 3 | Status: SHIPPED | OUTPATIENT
Start: 2021-09-01

## 2021-09-01 NOTE — PATIENT INSTRUCTIONS
SURVEY:    You may be receiving a survey from [employer] regarding your visit today. You may get this in the mail, through your MyChart or in your email. Please complete the survey to enable us to provide the highest quality of care to you and your family. If you cannot score us as very good ( 5 Stars) on any question, please feel free to call the office to discuss how we could have made your experience exceptional.     Thank you.     Clinical Care Team:  Dr. Fredy Estrada, DO [facility], [address] [place] [address] Team:  [address] [place] [address]

## 2021-09-01 NOTE — PROGRESS NOTES
Name: Steffi Osorio  : 1960         Chief Complaint:     Chief Complaint   Patient presents with    Hypertension    Gastroesophageal Reflux    Hyperlipidemia       History of Present Illness:      Steffi Osorio is a 63 y.o.  male who presents with Hypertension, Gastroesophageal Reflux, and Hyperlipidemia      HPI    F/u HTN. Feeling well, no complaints. Compliant with medications, tolerating well. Denies any chest pain, shortness of breath, loss of exercise tolerance. Working on weight loss, drinking water with cucumber infused in it, decreasing portion sizes and eliminating snacks at nighttime. Active outside. negro shoulder pain, has been told the joints are \"shot,\" did therapy which helped some but admits he doesn't keep up with same exercises at home. Still has reduction in sense of smell subsequent to COVID infection he had had in March. Improving a little bit. Medical History:     Patient Active Problem List   Diagnosis    HTN (hypertension)    GERD (gastroesophageal reflux disease)    Seasonal allergies    Pure hypercholesterolemia    Arthritis    Environmental and seasonal allergies       Medications:       Prior to Admission medications    Medication Sig Start Date End Date Taking?  Authorizing Provider   hydroCHLOROthiazide (HYDRODIURIL) 25 MG tablet Take 1 tablet by mouth daily 21  Yes Deborah Marcano DO   losartan (COZAAR) 100 MG tablet Take 1 tablet by mouth daily 21  Yes Deborah Marcano DO   aspirin EC 81 MG EC tablet Take 1 tablet by mouth daily 21  Yes Deborah Marcano DO   zinc gluconate 50 MG tablet Take 100 mg by mouth daily    Historical Provider, MD   vitamin D (CHOLECALCIFEROL) 25 MCG (1000 UT) TABS tablet Take 1,000 Units by mouth daily    Historical Provider, MD   ascorbic acid (VITAMIN C) 1000 MG tablet Take 1,000 mg by mouth daily    Historical Provider, MD   Omega-3 Fatty Acids (FISH OIL) 1200 MG CPDR Take by mouth    Historical Provider, MD GARLIC PO Take by mouth    Historical Provider, MD   Loratadine (CLARITIN PO) Take by mouth    Historical Provider, MD   montelukast (SINGULAIR) 10 MG tablet Take 1 tablet by mouth nightly  Patient not taking: Reported on 9/1/2021 2/6/19   Ilsa Aase, MD   fluticasone [facility] Stephanie ALLERGY RELIEF) 50 MCG/ACT nasal spray 1 spray by Nasal route daily 3/9/18   Shazia Gastelum,         Allergies:       Pneumococcal vaccines, Statins, and Tetracyclines & related    Physical Exam:     Vitals:  /78   Pulse 91   Ht 5' 10\" (1.778 m)   Wt 249 lb (112.9 kg)   SpO2 98%   BMI 35.73 kg/m²   Physical Exam  Vitals and nursing note reviewed. Constitutional:       General: He is not in acute distress. Appearance: He is well-developed. He is not ill-appearing. HENT:      Right Ear: Tympanic membrane and ear canal normal.      Left Ear: Tympanic membrane and ear canal normal.      Nose: Nose normal.   Eyes:      Conjunctiva/sclera: Conjunctivae normal.   Cardiovascular:      Rate and Rhythm: Normal rate and regular rhythm. Heart sounds: Normal heart sounds. Pulmonary:      Effort: Pulmonary effort is normal.      Breath sounds: Normal breath sounds. Musculoskeletal:      Cervical back: Neck supple. Lymphadenopathy:      Cervical: No cervical adenopathy. Skin:     General: Skin is warm and dry. Neurological:      Mental Status: He is alert and oriented to person, place, and time.    Psychiatric:         Judgment: Judgment normal.         Data:     Lab Results   Component Value Date     03/29/2021    K 4.1 03/29/2021     03/29/2021    CO2 24 03/29/2021    BUN 13 03/29/2021    CREATININE 0.82 03/29/2021    GLUCOSE 108 03/29/2021    PROT 7.1 03/29/2021    LABALBU 4.1 03/29/2021    BILITOT 0.63 03/29/2021    ALKPHOS 86 03/29/2021    AST 13 03/29/2021    ALT 33 03/29/2021     Lab Results   Component Value Date    WBC 9.5 03/29/2021    RBC 5.57 03/29/2021    HGB 16.5 03/29/2021    HCT 48.6

## 2022-01-19 ENCOUNTER — TELEPHONE (OUTPATIENT)
Dept: FAMILY MEDICINE CLINIC | Age: 62
End: 2022-01-19

## 2022-01-19 DIAGNOSIS — U07.1 COVID: Primary | ICD-10-CM

## 2022-01-19 RX ORDER — AZITHROMYCIN 250 MG/1
TABLET, FILM COATED ORAL
Qty: 6 TABLET | Refills: 0 | Status: SHIPPED | OUTPATIENT
Start: 2022-01-19 | End: 2022-03-16 | Stop reason: ALTCHOICE

## 2022-01-19 NOTE — TELEPHONE ENCOUNTER
Alberto Bourgeois took a home covid test and is positive for COVID. He is on day 6 with a fever. He has some questions he would like to speak with Joanna Johnson about. Health Maintenance   Topic Date Due    Hepatitis C screen  Never done    COVID-19 Vaccine (1) Never done    HIV screen  Never done    DTaP/Tdap/Td vaccine (1 - Tdap) Never done    Shingles Vaccine (1 of 2) Never done    A1C test (Diabetic or Prediabetic)  08/31/2019    Flu vaccine (1) 09/01/2021    Depression Screen  03/12/2022    Potassium monitoring  03/29/2022    Creatinine monitoring  03/29/2022    Lipid screen  09/11/2025    Colon cancer screen colonoscopy  07/11/2026    Hepatitis A vaccine  Aged Out    Hepatitis B vaccine  Aged Out    Hib vaccine  Aged Out    Meningococcal (ACWY) vaccine  Aged Out             (applicable per patient's age: Cancer Screenings, Depression Screening, Fall Risk Screening, Immunizations)    Hemoglobin A1C (%)   Date Value   08/31/2018 5.8   08/18/2017 5.7     LDL Cholesterol (mg/dL)   Date Value   09/11/2020 122     LDL Calculated (mg/dL)   Date Value   08/18/2017 160     AST (U/L)   Date Value   03/29/2021 13     ALT (U/L)   Date Value   03/29/2021 33     BUN (mg/dL)   Date Value   03/29/2021 13      (goal A1C is < 7)   (goal LDL is <100) need 30-50% reduction from baseline     BP Readings from Last 3 Encounters:   09/01/21 138/78   03/29/21 130/84   09/11/20 134/80    (goal /80)      All Future Testing planned in [facility]:  Lab Frequency Next Occurrence   COVID-19 Once 03/10/2021   Lipid Panel Once 09/02/2022   PSA screening Once 09/01/2022   Comprehensive Metabolic Panel Once 23/72/3849   Hepatitis C Antibody Once 09/01/2022       Next Visit Date:  No future appointments.          Patient Active Problem List:     HTN (hypertension)     GERD (gastroesophageal reflux disease)     Seasonal allergies     Pure hypercholesterolemia     Arthritis     Environmental and seasonal allergies

## 2022-01-19 NOTE — TELEPHONE ENCOUNTER
Not much else to do. Deep breaths. Georga Second on stomach when possible. Keep monitoring oxygen levels.

## 2022-01-19 NOTE — TELEPHONE ENCOUNTER
Doe Almazan indicated it sounded like pt was asking for antibiotic. Reasonable to try - rx sent for zpack.

## 2022-01-19 NOTE — TELEPHONE ENCOUNTER
Taking vitamins, otc meds, fluids, albuterol . No fever yesterday, temp 100.2 now. Coughing up phlegm. O2 93-94% not really SOB. using Mucinex DM and claritin.

## 2022-03-16 ENCOUNTER — OFFICE VISIT (OUTPATIENT)
Dept: FAMILY MEDICINE CLINIC | Age: 62
End: 2022-03-16
Payer: COMMERCIAL

## 2022-03-16 VITALS
DIASTOLIC BLOOD PRESSURE: 84 MMHG | OXYGEN SATURATION: 99 % | BODY MASS INDEX: 36.51 KG/M2 | HEIGHT: 70 IN | WEIGHT: 255 LBS | SYSTOLIC BLOOD PRESSURE: 136 MMHG | HEART RATE: 93 BPM

## 2022-03-16 DIAGNOSIS — M25.511 CHRONIC PAIN OF BOTH SHOULDERS: ICD-10-CM

## 2022-03-16 DIAGNOSIS — M25.512 CHRONIC PAIN OF BOTH SHOULDERS: ICD-10-CM

## 2022-03-16 DIAGNOSIS — I10 ESSENTIAL HYPERTENSION: Primary | ICD-10-CM

## 2022-03-16 DIAGNOSIS — G89.29 CHRONIC PAIN OF BOTH SHOULDERS: ICD-10-CM

## 2022-03-16 PROCEDURE — 99213 OFFICE O/P EST LOW 20 MIN: CPT | Performed by: FAMILY MEDICINE

## 2022-03-16 RX ORDER — LOSARTAN POTASSIUM 100 MG/1
100 TABLET ORAL DAILY
Qty: 90 TABLET | Refills: 1 | Status: SHIPPED | OUTPATIENT
Start: 2022-03-16 | End: 2022-09-09 | Stop reason: SDUPTHER

## 2022-03-16 RX ORDER — HYDROCHLOROTHIAZIDE 25 MG/1
25 TABLET ORAL DAILY
Qty: 90 TABLET | Refills: 1 | Status: SHIPPED | OUTPATIENT
Start: 2022-03-16 | End: 2022-09-09 | Stop reason: SDUPTHER

## 2022-03-16 RX ORDER — MELOXICAM 15 MG/1
15 TABLET ORAL DAILY
Qty: 90 TABLET | Refills: 1 | Status: SHIPPED | OUTPATIENT
Start: 2022-03-16 | End: 2022-07-18 | Stop reason: ALTCHOICE

## 2022-03-16 SDOH — ECONOMIC STABILITY: FOOD INSECURITY: WITHIN THE PAST 12 MONTHS, YOU WORRIED THAT YOUR FOOD WOULD RUN OUT BEFORE YOU GOT MONEY TO BUY MORE.: NEVER TRUE

## 2022-03-16 SDOH — ECONOMIC STABILITY: FOOD INSECURITY: WITHIN THE PAST 12 MONTHS, THE FOOD YOU BOUGHT JUST DIDN'T LAST AND YOU DIDN'T HAVE MONEY TO GET MORE.: NEVER TRUE

## 2022-03-16 ASSESSMENT — PATIENT HEALTH QUESTIONNAIRE - PHQ9
SUM OF ALL RESPONSES TO PHQ QUESTIONS 1-9: 0
SUM OF ALL RESPONSES TO PHQ QUESTIONS 1-9: 0
SUM OF ALL RESPONSES TO PHQ9 QUESTIONS 1 & 2: 0
2. FEELING DOWN, DEPRESSED OR HOPELESS: 0
1. LITTLE INTEREST OR PLEASURE IN DOING THINGS: 0
SUM OF ALL RESPONSES TO PHQ QUESTIONS 1-9: 0
SUM OF ALL RESPONSES TO PHQ QUESTIONS 1-9: 0

## 2022-03-16 ASSESSMENT — SOCIAL DETERMINANTS OF HEALTH (SDOH): HOW HARD IS IT FOR YOU TO PAY FOR THE VERY BASICS LIKE FOOD, HOUSING, MEDICAL CARE, AND HEATING?: NOT HARD AT ALL

## 2022-03-16 NOTE — PROGRESS NOTES
Name: Berna Burnett  : 1960         Chief Complaint:     Chief Complaint   Patient presents with    Hypertension       History of Present Illness:      Berna Burnett is a 63 y.o.  male who presents with Hypertension      HPI    Follow-up hypertension. Patient has been doing well, taking meds as directed without adverse effect. negro knee replacements doing well, saw ortho recently in [facility]. negro shoulder arthritis and decreased ROM, was recommended to restart PT and meloxicam but orders were not placed. 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 Patient had a second case of Covid in January which was much milder than his first, just had cold symptoms for about 2 days. Medical History:     Patient Active Problem List   Diagnosis    HTN (hypertension)    GERD (gastroesophageal reflux disease)    Seasonal allergies    Pure hypercholesterolemia    Arthritis    Environmental and seasonal allergies       Medications:       Prior to Admission medications    Medication Sig Start Date End Date Taking?  Authorizing Provider   losartan (COZAAR) 100 MG tablet Take 1 tablet by mouth daily 3/16/22  Yes Odell Bay DO   hydroCHLOROthiazide (HYDRODIURIL) 25 MG tablet Take 1 tablet by mouth daily 3/16/22  Yes Odell Bay DO   meloxicam (MOBIC) 15 MG tablet Take 1 tablet by mouth daily 3/16/22  Yes Odell Bay DO   aspirin EC 81 MG EC tablet Take 1 tablet by mouth daily 21  Yes Odell Bay DO   zinc gluconate 50 MG tablet Take 100 mg by mouth daily   Yes Historical Provider, MD   vitamin D (CHOLECALCIFEROL) 25 MCG (1000 UT) TABS tablet Take 1,000 Units by mouth daily   Yes Historical Provider, MD   ascorbic acid (VITAMIN C) 1000 MG tablet Take 1,000 mg by mouth daily   Yes Historical Provider, MD   Omega-3 Fatty Acids (FISH OIL) 1200 MG CPDR Take by mouth   Yes Historical Provider, MD   GARLIC PO Take by mouth   Yes Historical Provider, MD   Loratadine (CLARITIN PO) Take by mouth   Yes Historical Provider, MD   montelukast (SINGULAIR) 10 MG tablet Take 1 tablet by mouth nightly 2/6/19  Yes Destini Chan MD   fluticasone [facility] ALLERGY RELIEF) 50 MCG/ACT nasal spray 1 spray by Nasal route daily 3/9/18  Yes Juan Carlos Livingston DO        Allergies:       Pneumococcal vaccines, Statins, and Tetracyclines & related    Physical Exam:     Vitals:  /84   Pulse 93   Ht 5' 10\" (1.778 m)   Wt 255 lb (115.7 kg)   SpO2 99%   BMI 36.59 kg/m²   Physical Exam  Constitutional:       Appearance: Normal appearance. He is well-developed. He is not ill-appearing. Cardiovascular:      Rate and Rhythm: Normal rate and regular rhythm. Heart sounds: Normal heart sounds. Pulmonary:      Effort: Pulmonary effort is normal.      Breath sounds: Normal breath sounds. Psychiatric:         Mood and Affect: Mood normal.         Behavior: Behavior normal.         Data:     Lab Results   Component Value Date     03/29/2021    K 4.1 03/29/2021     03/29/2021    CO2 24 03/29/2021    BUN 13 03/29/2021    CREATININE 0.82 03/29/2021    GLUCOSE 108 03/29/2021    PROT 7.1 03/29/2021    LABALBU 4.1 03/29/2021    BILITOT 0.63 03/29/2021    ALKPHOS 86 03/29/2021    AST 13 03/29/2021    ALT 33 03/29/2021     Lab Results   Component Value Date    WBC 9.5 03/29/2021    RBC 5.57 03/29/2021    HGB 16.5 03/29/2021    HCT 48.6 03/29/2021    MCV 87.4 03/29/2021    MCH 29.6 03/29/2021    MCHC 33.9 03/29/2021    RDW 13.2 03/29/2021     03/29/2021    MPV NOT REPORTED 03/29/2021     Lab Results   Component Value Date    TSH 1.65 08/18/2017     Lab Results   Component Value Date    CHOL 198 09/11/2020    CHOL 229 08/18/2017    HDL 39 09/11/2020    PSA 2.32 09/11/2020    LABA1C 5.8 08/31/2018         Assessment & Plan:        Diagnosis Orders   1. Essential hypertension     2.  Chronic pain of both shoulders  [facility] - [facility]     Hypertension controlled, continue current Rx. Update labs at retail lab in [facility]. Chronic bilateral shoulder pain, records not available but per patient sounds as though he has cuff arthropathy. Order placed for PT and meloxicam ordered. Requested Prescriptions     Signed Prescriptions Disp Refills    losartan (COZAAR) 100 MG tablet 90 tablet 1     Sig: Take 1 tablet by mouth daily    hydroCHLOROthiazide (HYDRODIURIL) 25 MG tablet 90 tablet 1     Sig: Take 1 tablet by mouth daily    meloxicam (MOBIC) 15 MG tablet 90 tablet 1     Sig: Take 1 tablet by mouth daily         Patient Instructions   SURVEY:    You may be receiving a survey from [employer] regarding your visit today. You may get this in the mail, through your MyChart or in your email. Please complete the survey to enable us to provide the highest quality of care to you and your family. If you cannot score us as very good ( 5 Stars) on any question, please feel free to call the office to discuss how we could have made your experience exceptional.     Thank you. Clinical Care Team:  [facility] [address] [place] [address] Team:  Phil Drake received counseling on the following healthy behaviors: medication adherence  Reviewed prior labs and health maintenance. Continue current medications, diet and exercise. Discussed use, benefit, and side effects of prescribed medications. Barriers to medication compliance addressed. Patient given educational materials - see patient instructions. All patient questions answered.   Patient voiced understanding.     signed by Ruthie Cerrato DO on 3/16/2022 at 7:32 AM  [place] [phone]  Dept: [phone]

## 2022-03-16 NOTE — PATIENT INSTRUCTIONS
SURVEY:    You may be receiving a survey from [employer] regarding your visit today. You may get this in the mail, through your MyChart or in your email. Please complete the survey to enable us to provide the highest quality of care to you and your family. If you cannot score us as very good ( 5 Stars) on any question, please feel free to call the office to discuss how we could have made your experience exceptional.     Thank you.     Clinical Care Team:  Dr. Renetta Lopez, DO Nandini Simms, [address] [place] [address] Team:  [address] [place] [address]

## 2022-03-29 ENCOUNTER — HOSPITAL ENCOUNTER (OUTPATIENT)
Dept: PHYSICAL THERAPY | Age: 62
Setting detail: THERAPIES SERIES
Discharge: HOME | End: 2022-03-29
Payer: COMMERCIAL

## 2022-03-29 PROCEDURE — 97110 THERAPEUTIC EXERCISES: CPT

## 2022-03-29 PROCEDURE — 97162 PT EVAL MOD COMPLEX 30 MIN: CPT

## 2022-03-29 NOTE — PLAN OF CARE
[facility] DILLAN TRUJILLO       Phone: [phone]   Date: 3/29/2022                      Outpatient Physical Therapy  Fax: [phone]    ACCT #: [de-identified]                     Plan of Care  [facility]#: [id number]  Patient: Ligia Gutierrez  : 1960    Referring Practitioner: Dr. Luz Santos    Referral Date : 22    Diagnosis: Chronic Pain B/L Shoulders  Onset Date: 22  Treatment Diagnosis: B/L Shoulder pain    Assessment  Body structures, Functions, Activity limitations: Decreased functional mobility ,Decreased ADL status,Decreased ROM,Decreased strength,Decreased endurance,Decreased high-level IADLs,Increased pain,Decreased posture  Assessment: Pt educated on extensive HEP as pt wishes to complete HEP vs traditional PT due to Insurance concerns. Pt has had previous PT on B/L Shoulders and is familar with some of HEP from prior sessions. Pt will be placed on hold x2 wks to ensure understanding and if needed follow up visit for clarification/education. Pt demonstrates good form and understanding of HEP this date. Prognosis: Good    Treatment Plan   Days: 1 times per week Weeks: 2 weeks Total # of Visits Approved: 2    Patient Education/HEP and Therapeutic Exercise     Goals  Short term goals  Time Frame for Short term goals: 2 visits  Short term goal 1: Pt to be independent and compliant with HEP for ROM, posture and strength B/L shoulders  Short term goal 2: Pt to report no increased pain >6/10 x1wk        Dalila Francois PT   Date: 3/29/2022    ______________________________________ Date: 3/29/2022  Physician Signature  By signing above or cosigning electronically, I have reviewed this Plan of Care and certify a need for medically necessary rehabilitation services.

## 2022-03-29 NOTE — PROGRESS NOTES
Phone: [phone] [place]         Fax: [phone]                      Outpatient Physical Therapy                                                                      Evaluation    Date: 3/29/2022  Patient: Dinh Ocasio  : 1960  ACCT #: [de-identified]    Referring Practitioner: Dr. Dionna Simms    Referral Date : 22    Diagnosis: Chronic Pain B/L Shoulders    Treatment Diagnosis: B/L Shoulder pain  Onset Date: 22  Total # of Visits Approved: 2 Per Physician Order  Total # of Visits to Date: 1  No Show: 0  Canceled Appointment: 0     Subjective  Additional Pertinent Hx: Pt presents with chronic B/L Shoulder pain. Pt reports he would like to complete 1 visit then issue HEP due to insurance plan concerns. Pt notes in the winter month ROM and strength decrease and pain increases. Pt reports only pain is with sleeping(3/10 at worst) and with overhead reach. Meloxicam has sig helped with pain level. Overhead reach pain can reach 6/10. Pt reports Dr. Vicky Taylor does not feel pt is a canidate for shoulder sx at this time. Pt reports his L is worst than the right. Pt reports he still has his Tband and pulleys at home and would like an HEP due to poor insurance coverage. Xrays 4each shoulder negative for spurs.   Pain Screening  Patient Currently in Pain: Denies  Pain Assessment  Pain Assessment: 0-10    Objective  Strength RUE  R Shoulder Flexion: 4/5  R Shoulder Extension: 4+/5  R Shoulder ABduction: 4-/5  R Shoulder Internal Rotation: 4+/5  R Shoulder External Rotation: 4/5  R Shoulder Horizontal ABduction: 3+/5  Strength LUE  L Shoulder Flexion: 4/5  L Shoulder Extension: 4+/5  L Shoulder ABduction: 3+/5  L Shoulder Internal Rotation: 4+/5  L Shoulder External Rotation: 3+/5  L Shoulder Horizontal ABduction: 3+/5  L Elbow Flexion: 5/5  L Elbow Extension: 5/5    AROM RUE (degrees)  R Shoulder Flexion 0-180: 145deg  R Shoulder ABduction 0-180: 120deg  AROM LUE (degrees)  L Shoulder Flexion 0-180: 110deg  L Shoulder ABduction 0-180: 85deg     PROM RUE (degrees)  R Shoulder ABduction 0-180: 100deg  R Shoulder Int Rotation  0-70: @45degABD=35deg  R Shoulder Ext Rotation  0-90: @45degABD=50deg  PROM LUE (degrees)  L Shoulder ABduction 0-140: 92deg  L Shoulder Int Rotation  0-70: @45degABD= 35deg  L Shoulder Ext Rotation  0-90: @45degABD= 22deg     Assessment  Body structures, Functions, Activity limitations: Decreased functional mobility ,Decreased ADL status,Decreased ROM,Decreased strength,Decreased endurance,Decreased high-level IADLs,Increased pain,Decreased posture  Assessment: Pt educated on extensive HEP as pt wishes to complete HEP vs traditional PT due to Insurance concerns. Pt has had previous PT on B/L Shoulders and is familar with some of HEP from prior sessions. Pt will be placed on hold x2 wks to ensure understanding and if needed follow up visit for clarification/education. Pt demonstrates good form and understanding of HEP this date. Prognosis: Good  Decision Making: Medium Complexity  Exam: [id number]    Clinical Presentation:  Stable/Uncomplicated  The Following Comorbities will impact the patients progression and Plan of Care:   Cardiac Disease/Pacemaker, Previous Orthopedic Injury/Surgery and Osteoarthritis B/L Shoulders       Activity Tolerance: Patient Tolerated treatment well    Education: POC; HEP: Access Code: [facility]  URL: [email or web address]  Date: 2022  Prepared by: Svitlana Michelle    Exercises  Standing Shoulder Internal Rotation Stretch with Towel - 2 x daily - 7 x weekly - 1 sets - 5 reps - 10 hold  Standing Shoulder Abduction AAROM with Dowel - 2 x daily - 7 x weekly - 1 sets - 5 reps - 10 hold  Standing Shoulder Internal Rotation AAROM with Dowel - 2 x daily - 7 x weekly - 1 sets - 5 reps - 10 hold  Standing Shoulder Extension with Dowel - 2 x daily - 7 x weekly - 1 sets - 5 reps - 10 hold  Supine Shoulder Flexion with Dowel - 2 x daily - 7 x weekly - 1 sets - 5 reps - 10 hold  Supine Shoulder External Rotation with Dowel - 2 x daily - 7 x weekly - 1 sets - 5 reps - 10 hold  Doorway Pec Stretch at 60 Degrees Abduction with Arm Straight - 2 x daily - 7 x weekly - 1 sets - 3 reps - 20 hold  Single Arm Doorway Pec Stretch at 60 Elevation - 2 x daily - 7 x weekly - 1 sets - 3 reps - 20 hold  Scapular Retraction with Resistance - 2 x daily - 7 x weekly - 2 sets - 10 reps  Shoulder extension with resistance - Neutral - 2 x daily - 3 x weekly - 2 sets - 10 reps  Shoulder External Rotation Reactive Isometrics - 2 x daily - 7 x weekly - 2 sets - 10 reps  Prone Shoulder Horizontal Abduction on Swiss Ball - 2 x daily - 7 x weekly - 2 sets - 10 reps  Standing Shoulder Inferior Glide with Towel Roll - 2 x daily - 7 x weekly - 3 sets - 10 reps   Issued TBANDs for HEP also  Barriers to Learning: None    Goals  Short term goals  Time Frame for Short term goals: 2 visits  Short term goal 1: Pt to be independent and compliant with HEP for ROM, posture and strength B/L shoulders  Short term goal 2: Pt to report no increased pain >6/10 x1wk    Patient's Goal:  Patient goals : Be able to improve ROM and strength of his shoulders to improve ADLs such as putting on a coat    Timed Code Treatment Minutes: 15 Minutes  Total Treatment Time: 70  Time In: 1540  Time Out: 710 [address], PT Date: 3/29/2022

## 2022-06-08 ENCOUNTER — OFFICE VISIT (OUTPATIENT)
Dept: FAMILY MEDICINE CLINIC | Age: 62
End: 2022-06-08
Payer: COMMERCIAL

## 2022-06-08 VITALS
DIASTOLIC BLOOD PRESSURE: 82 MMHG | HEIGHT: 70 IN | HEART RATE: 102 BPM | WEIGHT: 251 LBS | OXYGEN SATURATION: 98 % | BODY MASS INDEX: 35.93 KG/M2 | SYSTOLIC BLOOD PRESSURE: 136 MMHG

## 2022-06-08 DIAGNOSIS — M54.16 ACUTE LUMBAR RADICULOPATHY: Primary | ICD-10-CM

## 2022-06-08 PROCEDURE — 99213 OFFICE O/P EST LOW 20 MIN: CPT | Performed by: FAMILY MEDICINE

## 2022-06-08 RX ORDER — PREDNISONE 20 MG/1
40 TABLET ORAL DAILY
Qty: 10 TABLET | Refills: 0 | Status: SHIPPED | OUTPATIENT
Start: 2022-06-08 | End: 2022-06-13

## 2022-06-08 NOTE — PROGRESS NOTES
Name: Adolph Rico  : 1960         Chief Complaint:     Chief Complaint   Patient presents with    Back Pain     left lower back pain, radiates down left buttock, hip and into left knee. saw chiropractor and has been doing home exercises with no relief. History of Present Illness:      Adolph Rico is a 63 y.o.  male who presents with Back Pain (left lower back pain, radiates down left buttock, hip and into left knee. saw chiropractor and has been doing home exercises with no relief. )      HPI    2 wks of L sided pain, indicates pain near L SI, comes down lateral thigh, settles quite a bit in the L knee (which has been replaced), very defined area. Had been taking mobic only on weekends but when this started he started taking mobic every day. This did not help. Added ES tylenol which did take the edge off a little bit. Has seen chiro Dr Amy Resendiz 3x without help. He recommended figure 4 stretch but when pt started doing that he did develop groin pain which he hadn't had prior to that. Pain not bothering him much overnight but is worse if lying on R side, seems better if lying on L side. Notices pain with twisting if bearing weight on LLE but not on R. Sometimes with weightbearing seems like the LLE wants to buckle. Couple mos ago had same trouble but on the R side, lasted 2-2.5 wks, resolved with 1 chiro tx    Medical History:     Patient Active Problem List   Diagnosis    HTN (hypertension)    GERD (gastroesophageal reflux disease)    Seasonal allergies    Pure hypercholesterolemia    Arthritis    Environmental and seasonal allergies       Medications:       Prior to Admission medications    Medication Sig Start Date End Date Taking?  Authorizing Provider   predniSONE (DELTASONE) 20 MG tablet Take 2 tablets by mouth daily for 5 days 22 Yes Chyrl Brisker, DO   losartan (COZAAR) 100 MG tablet Take 1 tablet by mouth daily 3/16/22   Chyrl Brisker, DO   hydroCHLOROthiazide (HYDRODIURIL) 25 MG tablet Take 1 tablet by mouth daily 3/16/22   Nicole Flores DO   meloxicam (MOBIC) 15 MG tablet Take 1 tablet by mouth daily 3/16/22   Nicole Flores DO   aspirin EC 81 MG EC tablet Take 1 tablet by mouth daily 9/1/21   Nicole Flores DO   zinc gluconate 50 MG tablet Take 100 mg by mouth daily    Historical Provider, MD   vitamin D (CHOLECALCIFEROL) 25 MCG (1000 UT) TABS tablet Take 1,000 Units by mouth daily    Historical Provider, MD   ascorbic acid (VITAMIN C) 1000 MG tablet Take 1,000 mg by mouth daily    Historical Provider, MD   Omega-3 Fatty Acids (FISH OIL) 1200 MG CPDR Take by mouth    Historical Provider, MD   GARLIC PO Take by mouth    Historical Provider, MD   Loratadine (CLARITIN PO) Take by mouth    Historical Provider, MD   montelukast (SINGULAIR) 10 MG tablet Take 1 tablet by mouth nightly 2/6/19   Dorothy Thakur MD   fluticasone [facility] ALLERGY RELIEF) 50 MCG/ACT nasal spray 1 spray by Nasal route daily 3/9/18   Nicole Flores DO        Allergies:       Pneumococcal vaccines, Statins, and Tetracyclines & related    Physical Exam:     Vitals:  /82   Pulse (!) 102   Ht 5' 10\" (1.778 m)   Wt 251 lb (113.9 kg)   SpO2 98%   BMI 36.01 kg/m²   Physical Exam  Vitals and nursing note reviewed. Constitutional:       General: He is not in acute distress. Appearance: He is well-developed. Pulmonary:      Effort: Pulmonary effort is normal.   Musculoskeletal:      Comments: Spinal curves within normal limits. No tenderness over either PSIS. Pelvis and medial malleoli level. Neg SLR. L hip mildly decreased ROM. Pain with passive internal rotation of hip. 5/5 strength negro lower ext. Skin:     General: Skin is warm and dry. Neurological:      Mental Status: He is alert and oriented to person, place, and time.    Psychiatric:         Judgment: Judgment normal.         Data:     Lab Results   Component Value Date     03/29/2021    K 4.1 03/29/2021  03/29/2021    CO2 24 03/29/2021    BUN 13 03/29/2021    CREATININE 0.82 03/29/2021    GLUCOSE 108 03/29/2021    PROT 7.1 03/29/2021    LABALBU 4.1 03/29/2021    BILITOT 0.63 03/29/2021    ALKPHOS 86 03/29/2021    AST 13 03/29/2021    ALT 33 03/29/2021     Lab Results   Component Value Date    WBC 9.5 03/29/2021    RBC 5.57 03/29/2021    HGB 16.5 03/29/2021    HCT 48.6 03/29/2021    MCV 87.4 03/29/2021    MCH 29.6 03/29/2021    MCHC 33.9 03/29/2021    RDW 13.2 03/29/2021     03/29/2021    MPV NOT REPORTED 03/29/2021     Lab Results   Component Value Date    TSH 1.65 08/18/2017     Lab Results   Component Value Date    CHOL 198 09/11/2020    CHOL 229 08/18/2017    HDL 39 09/11/2020    PSA 2.32 09/11/2020    LABA1C 5.8 08/31/2018     11/15/18 lumbar xr     Moderate multilevel diffuse degenerative change with a mild convex right 14    degree thoracolumbar scoliosis. 11/15/18 xr R hip  FINDINGS: There is mild degenerative change at the hips bilaterally symmetric. No focal lesion otherwise about the right hip or the pelvis.       Early degenerative change at the SI joints, not unusual for age. Assessment & Plan:        Diagnosis Orders   1. Acute lumbar radiculopathy       Symptoms c/w lumbar radic, clay as he has h/o same on R side in the past - likely has DDD causing foraminal stenosis. Pt said chiro had indicated possible ITB syndrome but with burning character of pain, originating in SI area, I believe problem is lumbar radiculopathy. Prednisone and exercises as below. Call if not improving. Requested Prescriptions     Signed Prescriptions Disp Refills    predniSONE (DELTASONE) 20 MG tablet 10 tablet 0     Sig: Take 2 tablets by mouth daily for 5 days         Patient Instructions     Patient Education        Low Back Pain: Exercises  Introduction  Here are some examples of exercises for you to try. The exercises may be suggested for a condition or for rehabilitation.  Start each exercise slowly. Ease off the exercises if you start to have pain. You will be told when to start these exercises and which ones will work bestfor you. How to do the exercises  Press-up    1. Lie on your stomach, supporting your body with your forearms. 2. Press your elbows down into the floor to raise your upper back. As you do this, relax your stomach muscles and allow your back to arch without using your back muscles. As your press up, do not let your hips or pelvis come off the floor. 3. Hold for 15 to 30 seconds, then relax. 4. Repeat 2 to 4 times. Alternate arm and leg (bird dog) exercise    Do this exercise slowly. Try to keep your body straight at all times, and donot let one hip drop lower than the other. 1. Start on the floor, on your hands and knees. 2. Tighten your belly muscles. 3. Raise one leg off the floor, and hold it straight out behind you. Be careful not to let your hip drop down, because that will twist your trunk. 4. Hold for about 6 seconds, then lower your leg and switch to the other leg. 5. Repeat 8 to 12 times on each leg. 6. Over time, work up to holding for 10 to 30 seconds each time. 7. If you feel stable and secure with your leg raised, try raising the opposite arm straight out in front of you at the same time. Knee-to-chest exercise    1. Lie on your back with your knees bent and your feet flat on the floor. 2. Bring one knee to your chest, keeping the other foot flat on the floor (or keeping the other leg straight, whichever feels better on your lower back). 3. Keep your lower back pressed to the floor. Hold for at least 15 to 30 seconds. 4. Relax, and lower the knee to the starting position. 5. Repeat with the other leg. Repeat 2 to 4 times with each leg. 6. To get more stretch, put your other leg flat on the floor while pulling your knee to your chest.  Curl-ups    1. Lie on the floor on your back with your knees bent at a 90-degree angle.  Your feet should be flat on the floor, about 12 inches from your buttocks. 2. Cross your arms over your chest. If this bothers your neck, try putting your hands behind your neck (not your head), with your elbows spread apart. 3. Slowly tighten your belly muscles and raise your shoulder blades off the floor. 4. Keep your head in line with your body, and do not press your chin to your chest.  5. Hold this position for 1 or 2 seconds, then slowly lower yourself back down to the floor. 6. Repeat 8 to 12 times. Pelvic tilt exercise    1. Lie on your back with your knees bent. 2. \"Brace\" your stomach. This means to tighten your muscles by pulling in and imagining your belly button moving toward your spine. You should feel like your back is pressing to the floor and your hips and pelvis are rocking back. 3. Hold for about 6 seconds while you breathe smoothly. 4. Repeat 8 to 12 times. Heel dig bridging    1. Lie on your back with both knees bent and your ankles bent so that only your heels are digging into the floor. Your knees should be bent about 90 degrees. 2. Then push your heels into the floor, squeeze your buttocks, and lift your hips off the floor until your shoulders, hips, and knees are all in a straight line. 3. Hold for about 6 seconds as you continue to breathe normally, and then slowly lower your hips back down to the floor and rest for up to 10 seconds. 4. Do 8 to 12 repetitions. Hamstring stretch in doorway    1. Lie on your back in a doorway, with one leg through the open door. 2. Slide your leg up the wall to straighten your knee. You should feel a gentle stretch down the back of your leg. 3. Hold the stretch for at least 15 to 30 seconds. Do not arch your back, point your toes, or bend either knee. Keep one heel touching the floor and the other heel touching the wall. 4. Repeat with your other leg. 5. Do 2 to 4 times for each leg. Hip flexor stretch    1.  Kneel on the floor with one knee bent and one leg behind you. Place your forward knee over your foot. Keep your other knee touching the floor. 2. Slowly push your hips forward until you feel a stretch in the upper thigh of your rear leg. 3. Hold the stretch for at least 15 to 30 seconds. Repeat with your other leg. 4. Do 2 to 4 times on each side. Wall sit    1. Stand with your back 10 to 12 inches away from a wall. 2. Lean into the wall until your back is flat against it. 3. Slowly slide down until your knees are slightly bent, pressing your lower back into the wall. 4. Hold for about 6 seconds, then slide back up the wall. 5. Repeat 8 to 12 times. Follow-up care is a key part of your treatment and safety. Be sure to make and go to all appointments, and call your doctor if you are having problems. It's also a good idea to know your test results and keep alist of the medicines you take. Where can you learn more? Go to https://LabMinds.Pidgon. org and sign in to your Sionex account. Enter I961 in the [employer] box to learn more about \"Low Back Pain: Exercises. \"     If you do not have an account, please click on the \"Sign Up Now\" link. Current as of: July 1, 2021               Content Version: 13.2  © 2006-2022 Healthwise, Incorporated. Care instructions adapted under license by [facility] ([facility]). If you have questions about a medical condition or this instruction, always ask your healthcare professional. [place] [address] any warranty or liability for your use of this information.                signed by Jaleel Burrell DO on 6/8/2022 at 10:58 PM  [address] [address]  Dept: [phone]

## 2022-06-08 NOTE — PATIENT INSTRUCTIONS
Patient Education        Low Back Pain: Exercises  Introduction  Here are some examples of exercises for you to try. The exercises may be suggested for a condition or for rehabilitation. Start each exercise slowly. Ease off the exercises if you start to have pain. You will be told when to start these exercises and which ones will work bestfor you. How to do the exercises  Press-up    1. Lie on your stomach, supporting your body with your forearms. 2. Press your elbows down into the floor to raise your upper back. As you do this, relax your stomach muscles and allow your back to arch without using your back muscles. As your press up, do not let your hips or pelvis come off the floor. 3. Hold for 15 to 30 seconds, then relax. 4. Repeat 2 to 4 times. Alternate arm and leg (bird dog) exercise    Do this exercise slowly. Try to keep your body straight at all times, and donot let one hip drop lower than the other. 1. Start on the floor, on your hands and knees. 2. Tighten your belly muscles. 3. Raise one leg off the floor, and hold it straight out behind you. Be careful not to let your hip drop down, because that will twist your trunk. 4. Hold for about 6 seconds, then lower your leg and switch to the other leg. 5. Repeat 8 to 12 times on each leg. 6. Over time, work up to holding for 10 to 30 seconds each time. 7. If you feel stable and secure with your leg raised, try raising the opposite arm straight out in front of you at the same time. Knee-to-chest exercise    1. Lie on your back with your knees bent and your feet flat on the floor. 2. Bring one knee to your chest, keeping the other foot flat on the floor (or keeping the other leg straight, whichever feels better on your lower back). 3. Keep your lower back pressed to the floor. Hold for at least 15 to 30 seconds. 4. Relax, and lower the knee to the starting position. 5. Repeat with the other leg. Repeat 2 to 4 times with each leg.   6. To get more stretch, put your other leg flat on the floor while pulling your knee to your chest.  Curl-ups    1. Lie on the floor on your back with your knees bent at a 90-degree angle. Your feet should be flat on the floor, about 12 inches from your buttocks. 2. Cross your arms over your chest. If this bothers your neck, try putting your hands behind your neck (not your head), with your elbows spread apart. 3. Slowly tighten your belly muscles and raise your shoulder blades off the floor. 4. Keep your head in line with your body, and do not press your chin to your chest.  5. Hold this position for 1 or 2 seconds, then slowly lower yourself back down to the floor. 6. Repeat 8 to 12 times. Pelvic tilt exercise    1. Lie on your back with your knees bent. 2. \"Brace\" your stomach. This means to tighten your muscles by pulling in and imagining your belly button moving toward your spine. You should feel like your back is pressing to the floor and your hips and pelvis are rocking back. 3. Hold for about 6 seconds while you breathe smoothly. 4. Repeat 8 to 12 times. Heel dig bridging    1. Lie on your back with both knees bent and your ankles bent so that only your heels are digging into the floor. Your knees should be bent about 90 degrees. 2. Then push your heels into the floor, squeeze your buttocks, and lift your hips off the floor until your shoulders, hips, and knees are all in a straight line. 3. Hold for about 6 seconds as you continue to breathe normally, and then slowly lower your hips back down to the floor and rest for up to 10 seconds. 4. Do 8 to 12 repetitions. Hamstring stretch in doorway    1. Lie on your back in a doorway, with one leg through the open door. 2. Slide your leg up the wall to straighten your knee. You should feel a gentle stretch down the back of your leg. 3. Hold the stretch for at least 15 to 30 seconds. Do not arch your back, point your toes, or bend either knee.  Keep one heel touching the floor and the other heel touching the wall. 4. Repeat with your other leg. 5. Do 2 to 4 times for each leg. Hip flexor stretch    1. Kneel on the floor with one knee bent and one leg behind you. Place your forward knee over your foot. Keep your other knee touching the floor. 2. Slowly push your hips forward until you feel a stretch in the upper thigh of your rear leg. 3. Hold the stretch for at least 15 to 30 seconds. Repeat with your other leg. 4. Do 2 to 4 times on each side. Wall sit    1. Stand with your back 10 to 12 inches away from a wall. 2. Lean into the wall until your back is flat against it. 3. Slowly slide down until your knees are slightly bent, pressing your lower back into the wall. 4. Hold for about 6 seconds, then slide back up the wall. 5. Repeat 8 to 12 times. Follow-up care is a key part of your treatment and safety. Be sure to make and go to all appointments, and call your doctor if you are having problems. It's also a good idea to know your test results and keep alist of the medicines you take. Where can you learn more? Go to https://Beamz Interactive.Symvato. org and sign in to your hike account. Enter I634 in the [employer] box to learn more about \"Low Back Pain: Exercises. \"     If you do not have an account, please click on the \"Sign Up Now\" link. Current as of: July 1, 2021               Content Version: 13.2  © 2006-2022 Healthwise, Incorporated. Care instructions adapted under license by [facility] ([facility]). If you have questions about a medical condition or this instruction, always ask your healthcare professional. [place] [address] any warranty or liability for your use of this information.

## 2022-06-14 ENCOUNTER — HOSPITAL ENCOUNTER (OUTPATIENT)
Age: 62
Discharge: HOME | End: 2022-06-16
Payer: COMMERCIAL

## 2022-06-14 ENCOUNTER — HOSPITAL ENCOUNTER (OUTPATIENT)
Dept: GENERAL RADIOLOGY | Age: 62
Discharge: HOME | End: 2022-06-16
Payer: COMMERCIAL

## 2022-06-14 ENCOUNTER — OFFICE VISIT (OUTPATIENT)
Dept: FAMILY MEDICINE CLINIC | Age: 62
End: 2022-06-14
Payer: COMMERCIAL

## 2022-06-14 VITALS — DIASTOLIC BLOOD PRESSURE: 86 MMHG | SYSTOLIC BLOOD PRESSURE: 136 MMHG | OXYGEN SATURATION: 98 % | HEART RATE: 89 BPM

## 2022-06-14 DIAGNOSIS — M25.552 LEFT HIP PAIN: ICD-10-CM

## 2022-06-14 DIAGNOSIS — M54.16 LEFT LUMBAR RADICULOPATHY: Primary | ICD-10-CM

## 2022-06-14 DIAGNOSIS — M54.16 LEFT LUMBAR RADICULOPATHY: ICD-10-CM

## 2022-06-14 PROCEDURE — 73502 X-RAY EXAM HIP UNI 2-3 VIEWS: CPT

## 2022-06-14 PROCEDURE — 72110 X-RAY EXAM L-2 SPINE 4/>VWS: CPT

## 2022-06-14 PROCEDURE — 99213 OFFICE O/P EST LOW 20 MIN: CPT | Performed by: FAMILY MEDICINE

## 2022-06-14 RX ORDER — GABAPENTIN 300 MG/1
300 CAPSULE ORAL 2 TIMES DAILY
Qty: 60 CAPSULE | Refills: 0 | Status: SHIPPED | OUTPATIENT
Start: 2022-06-14 | End: 2022-09-09 | Stop reason: ALTCHOICE

## 2022-06-14 NOTE — PROGRESS NOTES
Name: Hemant Fountain  : 1960         Chief Complaint:     Chief Complaint   Patient presents with    Lower Back Pain     left. prednisone did not help. tried massage, chiropractor and stretches       History of Present Illness:      Hemant Fountain is a 63 y.o.  male who presents with Lower Back Pain (left. prednisone did not help. tried massage, chiropractor and stretches)      HPI    Patient complains of severe ongoing left lower extremity pain. I had seen him for this 6 days ago. Took prednisone which didn't help at all. chiro Fri and then massage Sat. She found an area that was markedly tender, rubbed out and now it's bruised. Pain persists. Took meloxicam and tylenol this morning. Pain has now started radiating distal to the knee. What helps most is ice, applies large ice pack to anterior thigh and knee, sometimes SI area also. With sitting the buttock pain stops but thigh pain continues. No weakness, numbness, difficulty ambulating, changes in urination or stool. Medical History:     Patient Active Problem List   Diagnosis    HTN (hypertension)    GERD (gastroesophageal reflux disease)    Seasonal allergies    Pure hypercholesterolemia    Arthritis    Environmental and seasonal allergies       Medications:       Prior to Admission medications    Medication Sig Start Date End Date Taking? Authorizing Provider   gabapentin (NEURONTIN) 300 MG capsule Take 1 capsule by mouth 2 times daily for 30 days.  Intended supply: 30 days 22 Yes Zhang Hadley DO   losartan (COZAAR) 100 MG tablet Take 1 tablet by mouth daily 3/16/22   Zhang Hadley DO   hydroCHLOROthiazide (HYDRODIURIL) 25 MG tablet Take 1 tablet by mouth daily 3/16/22   Zhang Hadley DO   meloxicam (MOBIC) 15 MG tablet Take 1 tablet by mouth daily 3/16/22   Zhang Hadley DO   aspirin EC 81 MG EC tablet Take 1 tablet by mouth daily 21   Zhang Hadley DO   zinc gluconate 50 MG tablet Take 100 mg by mouth daily    Historical Provider, MD   vitamin D (CHOLECALCIFEROL) 25 MCG (1000 UT) TABS tablet Take 1,000 Units by mouth daily    Historical Provider, MD   ascorbic acid (VITAMIN C) 1000 MG tablet Take 1,000 mg by mouth daily    Historical Provider, MD   Omega-3 Fatty Acids (FISH OIL) 1200 MG CPDR Take by mouth    Historical Provider, MD   GARLIC PO Take by mouth    Historical Provider, MD   Loratadine (CLARITIN PO) Take by mouth    Historical Provider, MD   montelukast (SINGULAIR) 10 MG tablet Take 1 tablet by mouth nightly 2/6/19   Odesas Abdullahi MD   fluticasone [facility] ALLERGY RELIEF) 50 MCG/ACT nasal spray 1 spray by Nasal route daily 3/9/18   Mary Nash DO        Allergies:       Pneumococcal vaccines, Statins, and Tetracyclines & related    Physical Exam:     Vitals:  /86   Pulse 89   SpO2 98%   Physical Exam  Vitals and nursing note reviewed. Constitutional:       General: He is not in acute distress. Appearance: He is well-developed. Pulmonary:      Effort: Pulmonary effort is normal.   Musculoskeletal:      Comments: No tenderness and indicated site of left buttock. Pain elicited with passive motion of the left hip in internal rotation. Negative straight leg raise. Antalgic gait. Straightening of lumbar lordosis. Skin:     General: Skin is warm and dry. Comments: No bruising visible to me on left buttock in area indicated   Neurological:      Mental Status: He is alert and oriented to person, place, and time.    Psychiatric:         Judgment: Judgment normal.         Data:     Lab Results   Component Value Date     03/29/2021    K 4.1 03/29/2021     03/29/2021    CO2 24 03/29/2021    BUN 13 03/29/2021    CREATININE 0.82 03/29/2021    GLUCOSE 108 03/29/2021    PROT 7.1 03/29/2021    LABALBU 4.1 03/29/2021    BILITOT 0.63 03/29/2021    ALKPHOS 86 03/29/2021    AST 13 03/29/2021    ALT 33 03/29/2021     Lab Results   Component Value Date    WBC 9.5 03/29/2021 RBC 5.57 03/29/2021    HGB 16.5 03/29/2021    HCT 48.6 03/29/2021    MCV 87.4 03/29/2021    MCH 29.6 03/29/2021    MCHC 33.9 03/29/2021    RDW 13.2 03/29/2021     03/29/2021    MPV NOT REPORTED 03/29/2021     Lab Results   Component Value Date    TSH 1.65 08/18/2017     Lab Results   Component Value Date    CHOL 198 09/11/2020    CHOL 229 08/18/2017    HDL 39 09/11/2020    PSA 2.32 09/11/2020    LABA1C 5.8 08/31/2018         Assessment & Plan:        Diagnosis Orders   1. Left lumbar radiculopathy  XR LUMBAR SPINE (MIN 4 VIEWS)   2. Left hip pain  XR HIP LEFT (2-3 VIEWS)     Patient does have localized pain in the left buttock near the SI area and has developed some groin pain also with weightbearing and twisting, but most of his pain is clearly radicular in nature and at this point does radiate distal to the knee. L4 and 5 distribution. Starting gabapentin. X-rays. If not improving he will need MRI. Take Mobic every day. Requested Prescriptions     Signed Prescriptions Disp Refills    gabapentin (NEURONTIN) 300 MG capsule 60 capsule 0     Sig: Take 1 capsule by mouth 2 times daily for 30 days.  Intended supply: 30 days         There are no Patient Instructions on file for this visit.      signed by Nicole Flores DO on 6/19/2022 at 2:27 PM  [place] [id number]  Dept: [phone]

## 2022-06-28 ENCOUNTER — TELEPHONE (OUTPATIENT)
Dept: FAMILY MEDICINE CLINIC | Age: 62
End: 2022-06-28

## 2022-06-28 DIAGNOSIS — M54.16 LEFT LUMBAR RADICULOPATHY: Primary | ICD-10-CM

## 2022-06-28 NOTE — TELEPHONE ENCOUNTER
Patient called and his back pain is not completely better. He would like to take the next step which he thinks is physical therapy. Please advise. Patient last seen 6/14/22. Health Maintenance   Topic Date Due    COVID-19 Vaccine (1) Never done    HIV screen  Never done    Hepatitis C screen  Never done    DTaP/Tdap/Td vaccine (1 - Tdap) Never done    Shingles vaccine (1 of 2) Never done    A1C test (Diabetic or Prediabetic)  08/31/2019    Prostate Specific Antigen (PSA) Screening or Monitoring  09/11/2021    Flu vaccine (Season Ended) 03/16/2023 (Originally 9/1/2022)    Depression Screen  03/16/2023    Lipids  09/11/2025    Colorectal Cancer Screen  07/11/2026    Hepatitis A vaccine  Aged Out    Hepatitis B vaccine  Aged Out    Hib vaccine  Aged Out    Meningococcal (ACWY) vaccine  Aged Out             (applicable per patient's age: Cancer Screenings, Depression Screening, Fall Risk Screening, Immunizations)    Hemoglobin A1C (%)   Date Value   08/31/2018 5.8   08/18/2017 5.7     LDL Cholesterol (mg/dL)   Date Value   09/11/2020 122     LDL Calculated (mg/dL)   Date Value   08/18/2017 160     AST (U/L)   Date Value   03/29/2021 13     ALT (U/L)   Date Value   03/29/2021 33     BUN (mg/dL)   Date Value   03/29/2021 13      (goal A1C is < 7)   (goal LDL is <100) need 30-50% reduction from baseline     BP Readings from Last 3 Encounters:   06/14/22 136/86   06/08/22 136/82   03/16/22 136/84    (goal /80)      All Future Testing planned in [facility]:  Lab Frequency Next Occurrence   Lipid Panel Once 09/02/2022   PSA screening Once 09/01/2022   Comprehensive Metabolic Panel Once 20/98/2956   Hepatitis C Antibody Once 09/01/2022       Next Visit Date:  No future appointments.          Patient Active Problem List:     HTN (hypertension)     GERD (gastroesophageal reflux disease)     Seasonal allergies     Pure hypercholesterolemia     Arthritis     Environmental and seasonal allergies

## 2022-07-01 ENCOUNTER — TELEPHONE (OUTPATIENT)
Dept: FAMILY MEDICINE CLINIC | Age: 62
End: 2022-07-01

## 2022-07-01 DIAGNOSIS — M54.16 LEFT LUMBAR RADICULOPATHY: Primary | ICD-10-CM

## 2022-07-01 DIAGNOSIS — M25.552 LEFT HIP PAIN: ICD-10-CM

## 2022-07-01 RX ORDER — HYDROCODONE BITARTRATE AND ACETAMINOPHEN 5; 325 MG/1; MG/1
1 TABLET ORAL EVERY 6 HOURS PRN
Qty: 28 TABLET | Refills: 0 | Status: SHIPPED | OUTPATIENT
Start: 2022-07-01 | End: 2022-07-08

## 2022-07-01 NOTE — TELEPHONE ENCOUNTER
Pt called stating that he is having pain in his knee and left butt check, been to the Chiropractor 9 x and a massage therapist 2 x, icing and taking Tylenol  With no improvement. Pt is taking gabapentin and Mobic, he is now using a cane to walk. He is scheduled for PT on 07/08/22 and have him on a waiting list to get in sooner. Is there any other medication pt could take to help?  Please advise

## 2022-07-01 NOTE — TELEPHONE ENCOUNTER
Advised pt of provider's notes, pt voiced understanding and stated he will try the PT first before the MRI he is hoping PT will help him

## 2022-07-01 NOTE — TELEPHONE ENCOUNTER
norco sent to use prn. Narcotic so may cause drowsiness and constipation. I would recommend we also go ahead and try to get MRI approved if he's agreeable.

## 2022-07-05 ENCOUNTER — TELEPHONE (OUTPATIENT)
Dept: FAMILY MEDICINE CLINIC | Age: 62
End: 2022-07-05

## 2022-07-05 DIAGNOSIS — M54.17 LUMBOSACRAL RADICULOPATHY AT L5: Primary | ICD-10-CM

## 2022-07-05 NOTE — TELEPHONE ENCOUNTER
Pt called in asking if there was anyway he can get an MRI scheduled soon.  He doesn't have his first session of PT until this Friday 7/8, and his pain has not gotten any better

## 2022-07-07 ENCOUNTER — HOSPITAL ENCOUNTER (OUTPATIENT)
Dept: PHYSICAL THERAPY | Age: 62
Setting detail: THERAPIES SERIES
Discharge: HOME | End: 2022-07-07
Payer: COMMERCIAL

## 2022-07-07 PROCEDURE — 97162 PT EVAL MOD COMPLEX 30 MIN: CPT

## 2022-07-07 ASSESSMENT — PAIN SCALES - GENERAL: PAINLEVEL_OUTOF10: 3

## 2022-07-07 NOTE — PROGRESS NOTES
Phone: [phone] [facility]         Fax: [phone]                      Outpatient Physical Therapy                                                                      Evaluation    Date: 2022  Patient: Vj Sullivan  : 1960  ACCT #: [de-identified]    Referring Provider (secondary): Dr. Faustino Braun    Diagnosis: Left lumbar radiculopathy    Treatment Diagnosis: Left lumbar pain with radiculopathy  Onset Date: 22  PT Insurance Information: [employer]    Per Physician Order  Total # of Visits to Date: 1  No Show: 0  Canceled Appointment: 0     Subjective     Additional Pertinent Hx: 2 month onset of lumbar pain with left LE radiculopathy extending to foot. Radicular pain is intermittent however patient notes lateral to medial knee jt pain which is constant. Lumbar/ left SI pain is also constant. MRI to be scheduled. X-rays reveal mild scoliosis and DDD of lumbar region. He also reports pain extending into the groin region. Patient ambulating with cane for support due to weakness and pain. Oswestry = 21/45. 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  Strength LLE  Strength LLE: Exception  Comment: Discomfort with resisted hip flexion  L Hip Flexion: 3/5  L Hip ABduction: 3/5  L Knee Flexion: 3/5  L Knee Extension: 4/5                PROM LLE (degrees)  LLE General PROM: Limited hip IR to neutral with anterior groin pain  PROM RLE (degrees)  RLE General PROM: Limited hip IR to 15 deg                 WB Status: Ambulating with cane due to left LE pain and instability/weakness. Assessment  Assessment: Patient presents with left lumbar pain and left LE radiculopathy as well as left groin pain and limited hip mobility. His symptoms are suggestive of lumbar DDD with nerve impingment producing radicular symptoms however he may also have left hip DJD issues and advanced arthritis affecting his hip mobility and pain. Plan to progress with exercise for stretching and strengthening of the left hip and trunk as well as pelvic traction for lumbar decompression.     Therapy Prognosis: Good    Clinical Presentation:  Evolving  The Following Comorbities will impact the patients progression and Plan of Care:   Previous Orthopedic Injury/Surgery          Medium Complexity    Education: PT POC;  HEP of left hip figure 4 and piriformis stretching;  Benefits of traction        Learning  Does the patient/guardian have any barriers to learning?: No barriers  Will there be a co-learner?: No  What is the preferred language of the patient/guardian?: English  Is an  required?: No    Goals  Short Term Goals  Time Frame for Short term goals: 6 visits  Short term goal 1: Educate on home program of hip stretching and hip/trunk stabilization ex    Long Term Goals  Time Frame for Long term goals : 12 visits  Long term goal 1: Decrease left LE radicular symptoms by > 75%  Long term goal 2: Decrease left lumbar/gluteal pain to < 3/10 with daily activities and ambulation  Long term goal 3: Improve left hip mobility primarily with IR to 20 deg to reduce stress on lumbar/pelvic regions due to compensation    Patient's Goal:    Decrease left back and hip pain to function normally    Timed Code Treatment Minutes: 0 Minutes  Total Treatment Time: 60     Time In: 0143  Time Out: 0533    SALLY SHAW, PT Date: 7/7/2022

## 2022-07-07 NOTE — PLAN OF CARE
[facility]       Phone: [phone]   Date: 2022                      Outpatient Physical Therapy  Fax: [phone]    ACCT #: [de-identified]                     Plan of Care  [facility]#: [id number]  Patient: Eitan Smith  : 1960    Referring Provider: Dr. Alcira Tucker    Diagnosis: Left lumbar radiculopathy  Onset Date: 22  Treatment Diagnosis: Left lumbar pain with radiculopathy    Assessment: Patient presents with left lumbar pain and left LE radiculopathy as well as left groin pain and limited hip mobility. His symptoms are suggestive of lumbar DDD with nerve impingment producing radicular symptoms however he may also have left hip DJD issues and advanced arthritis affecting his hip mobility and pain. Plan to progress with exercise for stretching and strengthening of the left hip and trunk as well as pelvic traction for lumbar decompression  Therapy Prognosis: Good    Treatment Plan :   Days: 2-3  times per week Weeks: 8 weeks      Patient Education/HEP, Therapeutic Exercise, Manual Therapy, Traction and Dry Needling     Goals  Time Frame for Short term goals: 6 visits  Short term goal 1: Educate on home program of hip stretching and hip/trunk stabilization ex    Time Frame for Long term goals : 12 visits  Long term goal 1: Decrease left LE radicular symptoms by > 75%  Long term goal 2: Decrease left lumbar/gluteal pain to < 3/10 with daily activities and ambulation  Long term goal 3: Improve left hip mobility primarily with IR to 20 deg to reduce stress on lumbar/pelvic regions due to compensation     SOFIE LEWIS, PT   Date: 2022    ______________________________________ Date: 2022  Physician Signature  By signing above or cosigning electronically, I have reviewed this Plan of Care and certify a need for medically necessary rehabilitation services.

## 2022-07-08 ENCOUNTER — HOSPITAL ENCOUNTER (OUTPATIENT)
Dept: PHYSICAL THERAPY | Age: 62
Setting detail: THERAPIES SERIES
Discharge: HOME | End: 2022-07-08
Payer: COMMERCIAL

## 2022-07-08 PROCEDURE — 97140 MANUAL THERAPY 1/> REGIONS: CPT

## 2022-07-08 PROCEDURE — 97012 MECHANICAL TRACTION THERAPY: CPT

## 2022-07-08 ASSESSMENT — PAIN SCALES - GENERAL: PAINLEVEL_OUTOF10: 3

## 2022-07-08 NOTE — PROGRESS NOTES
compensation    Post Treatment Pain:  2-3/10    Time In: 1335    Time Out : 1415   Timed Code Treatment Minutes: 15 Minutes  Total Treatment Time: 36 Minutes    To [place]     Date: 7/8/2022

## 2022-07-12 ENCOUNTER — HOSPITAL ENCOUNTER (OUTPATIENT)
Dept: PHYSICAL THERAPY | Age: 62
Setting detail: THERAPIES SERIES
Discharge: HOME | End: 2022-07-12
Payer: COMMERCIAL

## 2022-07-12 PROCEDURE — 97140 MANUAL THERAPY 1/> REGIONS: CPT

## 2022-07-12 PROCEDURE — 97012 MECHANICAL TRACTION THERAPY: CPT

## 2022-07-12 PROCEDURE — 97110 THERAPEUTIC EXERCISES: CPT

## 2022-07-12 ASSESSMENT — PAIN SCALES - GENERAL: PAINLEVEL_OUTOF10: 6

## 2022-07-13 ENCOUNTER — HOSPITAL ENCOUNTER (OUTPATIENT)
Dept: PHYSICAL THERAPY | Age: 62
Setting detail: THERAPIES SERIES
Discharge: HOME | End: 2022-07-13
Payer: COMMERCIAL

## 2022-07-13 PROCEDURE — 97110 THERAPEUTIC EXERCISES: CPT

## 2022-07-13 PROCEDURE — 97012 MECHANICAL TRACTION THERAPY: CPT

## 2022-07-13 NOTE — PROGRESS NOTES
Time Out : 0805        Timed Code Treatment Minutes: 22 Minutes  Total Treatment Time: 35 Minutes    Shayna Costa   PTA  Date: 7/13/2022

## 2022-07-15 ENCOUNTER — HOSPITAL ENCOUNTER (OUTPATIENT)
Dept: PHYSICAL THERAPY | Age: 62
Setting detail: THERAPIES SERIES
Discharge: HOME | End: 2022-07-15
Payer: COMMERCIAL

## 2022-07-15 PROCEDURE — 97140 MANUAL THERAPY 1/> REGIONS: CPT

## 2022-07-15 PROCEDURE — 97012 MECHANICAL TRACTION THERAPY: CPT

## 2022-07-15 ASSESSMENT — PAIN SCALES - GENERAL: PAINLEVEL_OUTOF10: 5

## 2022-07-15 NOTE — PROGRESS NOTES
Phone: Leela Barth      Fax: [phone]                            Outpatient Physical Therapy                                                                            Daily Note    Date: 7/15/2022  Patient Name: Eitan Smith        MRN: [id number]   ACCT#:  [de-identified]  : 1960  (63 y.o.)    Referring Provider (secondary): Dr. Alcira Tucker         Diagnosis: Left lumbar radiculopathy  Treatment Diagnosis: Left lumbar pain with radiculopathy    Onset Date: 22  PT Insurance Information: BCBS  Total # of Visits Approved: 12 Per Physician Order  Total # of Visits to Date: 5  No Show: 0  Canceled Appointment: 0  Plan of Care/Certification Expiration Date: 22    Pre-Treatment Pain:  5/10     Assessment  Assessment: Pt reports minimal achiness in low back after last visit. Yesterday morning after pt did his HEP he had increased pain , pain in knee and groin /10. Increased difficulty lifting left LE into car, over threshold etc.  Pt notes pain now across joint line of knee. Ed  pt on sidelying over pillow  to decrease radicular symptoms. May modify to manual therapy next vist on back. Performed ex for flexibility , manual for hip mobility and traction for decompression.     Plan  Continue with current plan of care    Exercises/Modalities/Manual:  See DocFlow Sheet            Goals  (Total # of Visits to Date: 5)   Short Term Goals  Time Frame for Short term goals: 6 visits  Short term goal 1: Educate on home program of hip stretching and hip/trunk stabilization ex    Long Term Goals  Time Frame for Long term goals : 12 visits  Long term goal 1: Decrease left LE radicular symptoms by > 75%  Long term goal 2: Decrease left lumbar/gluteal pain to < 3/10 with daily activities and ambulation  Long term goal 3: Improve left hip mobility primarily with IR to 20 deg to reduce stress on lumbar/pelvic regions due to compensation    Post Treatment Pain:  5/10    Time In: 0730

## 2022-07-18 ENCOUNTER — OFFICE VISIT (OUTPATIENT)
Dept: FAMILY MEDICINE CLINIC | Age: 62
End: 2022-07-18
Payer: COMMERCIAL

## 2022-07-18 VITALS
HEART RATE: 74 BPM | DIASTOLIC BLOOD PRESSURE: 82 MMHG | SYSTOLIC BLOOD PRESSURE: 138 MMHG | OXYGEN SATURATION: 98 % | TEMPERATURE: 98.7 F

## 2022-07-18 DIAGNOSIS — M25.552 LEFT HIP PAIN: ICD-10-CM

## 2022-07-18 DIAGNOSIS — M54.17 LUMBOSACRAL RADICULOPATHY AT L5: Primary | ICD-10-CM

## 2022-07-18 DIAGNOSIS — M54.16 LEFT LUMBAR RADICULOPATHY: ICD-10-CM

## 2022-07-18 PROCEDURE — 99213 OFFICE O/P EST LOW 20 MIN: CPT | Performed by: NURSE PRACTITIONER

## 2022-07-18 RX ORDER — DICLOFENAC SODIUM 75 MG/1
75 TABLET, DELAYED RELEASE ORAL 2 TIMES DAILY
Qty: 60 TABLET | Refills: 2 | Status: SHIPPED | OUTPATIENT
Start: 2022-07-18 | End: 2022-10-16

## 2022-07-18 ASSESSMENT — ENCOUNTER SYMPTOMS
SHORTNESS OF BREATH: 0
NAUSEA: 0
DIARRHEA: 0
COUGH: 0
VOMITING: 0

## 2022-07-18 NOTE — PROGRESS NOTES
[facility]  Rehab and Wellness    Date: 2022  Patient Name: Kristian Buckley        : 1960     Pt phoned stating he had severe increased pain after last session. He saw Chiropractor later that day . Chiropractor notes LLD with possible SI inflammation and possible bursitis. Pt notes sig improvement after Chiropractic care. Pt plans to contact Dr office regarding pursuing MRI and plans to follow up with Chiropractor. Pt requests to hold therapy until Friday, will call if he decides not to attend next visit.        Allison Costa PTA Date: 2022

## 2022-07-18 NOTE — PATIENT INSTRUCTIONS
SURVEY:    You may be receiving a survey from [employer] regarding your visit today. Please complete the survey to enable us to provide the highest quality of care to you and your family. If you cannot score us a very good (5 Stars) on any question, please call the office to discuss how we could have made your experience a very good one. Thank you.     Clinical Care Team: TARA Muñiz-JOSE Jeter LPN    Clerical Team: Celena Ramires

## 2022-07-18 NOTE — PROGRESS NOTES
Heart Attack Father        Review of Systems:         Review of Systems   Constitutional:  Negative for chills and fever. Respiratory:  Negative for cough and shortness of breath. Cardiovascular:  Negative for chest pain and palpitations. Gastrointestinal:  Negative for diarrhea, nausea and vomiting. Musculoskeletal:  Positive for arthralgias and gait problem. Neurological:  Negative for dizziness, seizures and headaches. Physical Exam:     Vitals:  /82   Pulse 74   Temp 98.7 °F (37.1 °C) (Oral)   SpO2 98%       Physical Exam  Vitals and nursing note reviewed. Constitutional:       General: He is not in acute distress. Appearance: He is well-developed. Cardiovascular:      Rate and Rhythm: Normal rate and regular rhythm. Heart sounds: S1 normal and S2 normal.   Pulmonary:      Effort: Pulmonary effort is normal. No respiratory distress. Breath sounds: Normal breath sounds. Abdominal:      General: Bowel sounds are normal.      Palpations: Abdomen is soft. Tenderness: There is no abdominal tenderness. Musculoskeletal:      Comments: No tenderness and indicated site of left buttock. Pain elicited with passive motion of the left hip in internal rotation. Negative straight leg raise. Antalgic gait. Straightening of lumbar lordosis. Skin:     General: Skin is warm and dry. Comments: No bruising visible to me on left buttock in area indicated   Neurological:      Mental Status: He is alert and oriented to person, place, and time. Psychiatric:         Behavior: Behavior is cooperative.          Judgment: Judgment normal.             Data:     Lab Results   Component Value Date/Time     03/29/2021 12:21 PM    K 4.1 03/29/2021 12:21 PM     03/29/2021 12:21 PM    CO2 24 03/29/2021 12:21 PM    BUN 13 03/29/2021 12:21 PM    CREATININE 0.82 03/29/2021 12:21 PM    GLUCOSE 108 03/29/2021 12:21 PM    PROT 7.1 03/29/2021 12:21 PM    LABALBU 4.1 03/29/2021 12:21 PM    BILITOT 0.63 03/29/2021 12:21 PM    ALKPHOS 86 03/29/2021 12:21 PM    AST 13 03/29/2021 12:21 PM    ALT 33 03/29/2021 12:21 PM     Lab Results   Component Value Date/Time    WBC 9.5 03/29/2021 12:21 PM    RBC 5.57 03/29/2021 12:21 PM    HGB 16.5 03/29/2021 12:21 PM    HCT 48.6 03/29/2021 12:21 PM    MCV 87.4 03/29/2021 12:21 PM    MCH 29.6 03/29/2021 12:21 PM    MCHC 33.9 03/29/2021 12:21 PM    RDW 13.2 03/29/2021 12:21 PM     03/29/2021 12:21 PM    MPV NOT REPORTED 03/29/2021 12:21 PM     Lab Results   Component Value Date/Time    TSH 1.65 08/18/2017 12:00 AM     Lab Results   Component Value Date/Time    CHOL 198 09/11/2020 07:19 AM    CHOL 229 08/18/2017 12:00 AM    HDL 39 09/11/2020 07:19 AM    PSA 2.32 09/11/2020 07:19 AM    LABA1C 5.8 08/31/2018 07:22 AM          Assessment & Plan        Diagnosis Orders   1. Lumbosacral radiculopathy at L5        2. Left lumbar radiculopathy        3. Left hip pain          Will change to diclofenac. Pt will call insurance company and make sure MRI is approved prior to scheduling. Continue gabapentin. Patient verbalizes understanding and agreement with plan. All questions answered. If symptoms do not resolve or worsen, return to office. Completed Refills   Requested Prescriptions     Signed Prescriptions Disp Refills    diclofenac (VOLTAREN) 75 MG EC tablet 60 tablet 2     Sig: Take 1 tablet by mouth in the morning and 1 tablet before bedtime. No follow-ups on file. Orders Placed This Encounter   Medications    diclofenac (VOLTAREN) 75 MG EC tablet     Sig: Take 1 tablet by mouth in the morning and 1 tablet before bedtime. Dispense:  60 tablet     Refill:  2     No orders of the defined types were placed in this encounter. Patient Instructions   SURVEY:    You may be receiving a survey from [employer] regarding your visit today.     Please complete the survey to enable us to provide the highest quality of care to you and your family. If you cannot score us a very good (5 Stars) on any question, please call the office to discuss how we could have made your experience a very good one. Thank you. Clinical Care Team: REMY Mario LPN    Clerical Team: Paul Toussaint   Electronically signed by TARA Mario CNP on 7/18/2022 at 5:11 PM           Completed Refills   Requested Prescriptions     Signed Prescriptions Disp Refills    diclofenac (VOLTAREN) 75 MG EC tablet 60 tablet 2     Sig: Take 1 tablet by mouth in the morning and 1 tablet before bedtime.

## 2022-07-19 ENCOUNTER — HOSPITAL ENCOUNTER (OUTPATIENT)
Dept: PHYSICAL THERAPY | Age: 62
Setting detail: THERAPIES SERIES
End: 2022-07-19
Payer: COMMERCIAL

## 2022-07-19 ENCOUNTER — TELEPHONE (OUTPATIENT)
Dept: FAMILY MEDICINE CLINIC | Age: 62
End: 2022-07-19

## 2022-07-19 NOTE — TELEPHONE ENCOUNTER
Patient called to let Michael Pritchett know that his insurance [employer] has nothing on file for prior auth on MRI. MRI was ordered on 7/5/22.       Health Maintenance   Topic Date Due    COVID-19 Vaccine (1) Never done    HIV screen  Never done    Hepatitis C screen  Never done    DTaP/Tdap/Td vaccine (1 - Tdap) Never done    Shingles vaccine (1 of 2) Never done    A1C test (Diabetic or Prediabetic)  08/31/2019    Prostate Specific Antigen (PSA) Screening or Monitoring  09/11/2021    Flu vaccine (1) 09/01/2022    Depression Screen  03/16/2023    Lipids  09/11/2025    Colorectal Cancer Screen  07/11/2026    Hepatitis A vaccine  Aged Out    Hepatitis B vaccine  Aged Out    Hib vaccine  Aged Out    Meningococcal (ACWY) vaccine  Aged Out             (applicable per patient's age: Cancer Screenings, Depression Screening, Fall Risk Screening, Immunizations)    Hemoglobin A1C (%)   Date Value   08/31/2018 5.8   08/18/2017 5.7     LDL Cholesterol (mg/dL)   Date Value   09/11/2020 122     LDL Calculated (mg/dL)   Date Value   08/18/2017 160     AST (U/L)   Date Value   03/29/2021 13     ALT (U/L)   Date Value   03/29/2021 33     BUN (mg/dL)   Date Value   03/29/2021 13      (goal A1C is < 7)   (goal LDL is <100) need 30-50% reduction from baseline     BP Readings from Last 3 Encounters:   07/18/22 138/82   06/14/22 136/86   06/08/22 136/82    (goal /80)      All Future Testing planned in [facility]:  Lab Frequency Next Occurrence   Lipid Panel Once 09/02/2022   PSA screening Once 09/01/2022   Comprehensive Metabolic Panel Once 17/24/0732   Hepatitis C Antibody Once 09/01/2022   MRI LUMBAR SPINE WO CONTRAST Once 07/05/2022       Next Visit Date:  Future Appointments   Date Time Provider Cassandra Wan   7/22/2022  8:00 AM Della Granados, PT [facility] PT [facility]            Patient Active Problem List:     HTN (hypertension)     GERD (gastroesophageal reflux disease)     Seasonal allergies     Pure hypercholesterolemia Arthritis     Environmental and seasonal allergies

## 2022-07-19 NOTE — TELEPHONE ENCOUNTER
Patient was contacted to schedule on 7/6/22. Patient had stated that he wanted to wait until after PT. Pre cert isn't sent until appt is scheduled.  Message left for patient and number given to schedule

## 2022-07-21 ENCOUNTER — APPOINTMENT (OUTPATIENT)
Dept: PHYSICAL THERAPY | Age: 62
End: 2022-07-21
Payer: COMMERCIAL

## 2022-07-22 ENCOUNTER — HOSPITAL ENCOUNTER (OUTPATIENT)
Dept: PHYSICAL THERAPY | Age: 62
Setting detail: THERAPIES SERIES
Discharge: HOME | End: 2022-07-22
Payer: COMMERCIAL

## 2022-07-22 PROCEDURE — 97140 MANUAL THERAPY 1/> REGIONS: CPT

## 2022-07-22 PROCEDURE — 97110 THERAPEUTIC EXERCISES: CPT

## 2022-07-22 NOTE — PROGRESS NOTES
activities and ambulation  Long term goal 3: Improve left hip mobility primarily with IR to 20 deg to reduce stress on lumbar/pelvic regions due to compensation    Post Treatment Pain:  4/10    Time In: 0800    Time Out : 0845        Timed Code Treatment Minutes: 30 Minutes  Total Treatment Time: Rosalee Matt 40, PT     Date: 7/22/2022

## 2022-07-26 ENCOUNTER — HOSPITAL ENCOUNTER (OUTPATIENT)
Dept: PHYSICAL THERAPY | Age: 62
Setting detail: THERAPIES SERIES
Discharge: HOME | End: 2022-07-26
Payer: COMMERCIAL

## 2022-07-26 PROCEDURE — 97140 MANUAL THERAPY 1/> REGIONS: CPT

## 2022-07-26 NOTE — PROGRESS NOTES
goal 1: Decrease left LE radicular symptoms by > 75%  Long term goal 2: Decrease left lumbar/gluteal pain to < 3/10 with daily activities and ambulation  Long term goal 3: Improve left hip mobility primarily with IR to 20 deg to reduce stress on lumbar/pelvic regions due to compensation    Post Treatment Pain:  3/10    Time In: 0820    Time Out : 0900        Timed Code Treatment Minutes: 25 Minutes  Total Treatment Time: 36 [id number] [facility] Marcelino, PT     Date: 7/26/2022

## 2022-07-27 ENCOUNTER — TELEPHONE (OUTPATIENT)
Dept: FAMILY MEDICINE CLINIC | Age: 62
End: 2022-07-27

## 2022-07-27 NOTE — TELEPHONE ENCOUNTER
Patient has had no improvement or change in sx with PT. Has done PT, chiropractors, traction, etc and still using a cane. Patient states that he was told by  [employer] to go ahead and get the MRI even though it was denied. Patient states that [employer] told him that insurance may approve it at a later date.  Patient canceled MRI until an approval can be determined

## 2022-07-27 NOTE — TELEPHONE ENCOUNTER
Please get update on his symptoms and we can decide whether to try to get it approved or give things more time with exercises and using the heel lift that physical therapy gave him.  I think the only way I can probably get it approved as if we can say that he has gotten worse since starting physical therapy

## 2022-07-27 NOTE — TELEPHONE ENCOUNTER
Patient states [employer] called and his MRI was denied. Patient states he needs a peer to peer. Patient is schedule for tomorrow for MRI. Patient would like Ulcar[employer] to reach out to him.     Health Maintenance   Topic Date Due    COVID-19 Vaccine (1) Never done    HIV screen  Never done    Hepatitis C screen  Never done    DTaP/Tdap/Td vaccine (1 - Tdap) Never done    Shingles vaccine (1 of 2) Never done    A1C test (Diabetic or Prediabetic)  08/31/2019    Prostate Specific Antigen (PSA) Screening or Monitoring  09/11/2021    Flu vaccine (1) 09/01/2022    Depression Screen  03/16/2023    Lipids  09/11/2025    Colorectal Cancer Screen  07/11/2026    Hepatitis A vaccine  Aged Out    Hepatitis B vaccine  Aged Out    Hib vaccine  Aged Out    Meningococcal (ACWY) vaccine  Aged Out             (applicable per patient's age: Cancer Screenings, Depression Screening, Fall Risk Screening, Immunizations)    Hemoglobin A1C (%)   Date Value   08/31/2018 5.8   08/18/2017 5.7     LDL Cholesterol (mg/dL)   Date Value   09/11/2020 122     LDL Calculated (mg/dL)   Date Value   08/18/2017 160     AST (U/L)   Date Value   03/29/2021 13     ALT (U/L)   Date Value   03/29/2021 33     BUN (mg/dL)   Date Value   03/29/2021 13      (goal A1C is < 7)   (goal LDL is <100) need 30-50% reduction from baseline     BP Readings from Last 3 Encounters:   07/18/22 138/82   06/14/22 136/86   06/08/22 136/82    (goal /80)      All Future Testing planned in [facility]:  Lab Frequency Next Occurrence   Lipid Panel Once 09/02/2022   PSA screening Once 09/01/2022   Comprehensive Metabolic Panel Once 12/31/1800   Hepatitis C Antibody Once 09/01/2022   MRI LUMBAR SPINE WO CONTRAST Once 07/05/2022       Next Visit Date:  Future Appointments   Date Time Provider Cassandra Wan   7/28/2022  1:00 PM [facility] MRI [facility] MRI [facility] Rad            Patient Active Problem List:     HTN (hypertension)     GERD (gastroesophageal reflux disease)     Seasonal allergies     Pure hypercholesterolemia     Arthritis     Environmental and seasonal allergies

## 2022-07-27 NOTE — TELEPHONE ENCOUNTER
Recommend he continue PT for couple more weeks and then I can reorder. Usually failing 4 weeks of PT gets an MRI covered.

## 2022-07-29 ENCOUNTER — HOSPITAL ENCOUNTER (OUTPATIENT)
Dept: PHYSICAL THERAPY | Age: 62
Setting detail: THERAPIES SERIES
Discharge: HOME | End: 2022-07-29
Payer: COMMERCIAL

## 2022-07-29 PROCEDURE — 97110 THERAPEUTIC EXERCISES: CPT

## 2022-07-29 PROCEDURE — 97140 MANUAL THERAPY 1/> REGIONS: CPT

## 2022-07-29 ASSESSMENT — PAIN SCALES - GENERAL: PAINLEVEL_OUTOF10: 3

## 2022-07-29 NOTE — PROGRESS NOTES
Phone: Leela Barth      Fax: [phone]                            Outpatient Physical Therapy                                                                            Daily Note    Date: 2022  Patient Name: Akua Kinney        MRN: [id number]   ACCT#:  [de-identified]  : 1960  (63 y.o.)    Referring Provider (secondary): Dr. Laura Venegas         Diagnosis: Left lumbar radiculopathy  Treatment Diagnosis: Left lumbar pain with radiculopathy    Onset Date: 22  PT Insurance Information: BCBS  Total # of Visits Approved: 12 Per Physician Order  Total # of Visits to Date: 8  No Show: 0  Canceled Appointment: 0  Plan of Care/Certification Expiration Date: 22    Pre-Treatment Pain:  3/10     Assessment  Assessment: MRI delayed d/t needing more therapy before getting approval.Pain fluctuates 3-7/10. Knee is constant, groin and lateral hip are intermittent. Pain is \"burning\" Good ji to heel lift. No sig LLD. Performed manual therapy of hip mobs and Hip PROM for flexibility. Will monitor.     Plan  Continue with current plan of care    Exercises/Modalities/Manual:  See DocFlow Sheet              Goals  (Total # of Visits to Date: 8)   Short Term Goals  Time Frame for Short term goals: 6 visits  Short term goal 1: Educate on home program of hip stretching and hip/trunk stabilization ex    Long Term Goals  Time Frame for Long term goals : 12 visits  Long term goal 1: Decrease left LE radicular symptoms by > 75%  Long term goal 2: Decrease left lumbar/gluteal pain to < 3/10 with daily activities and ambulation  Long term goal 3: Improve left hip mobility primarily with IR to 20 deg to reduce stress on lumbar/pelvic regions due to compensation    Post Treatment Pain:  3/10    Time In: 0950  Time Out : 1025        Timed Code Treatment Minutes: 35 Minutes  Total Treatment Time: 35 Minutes    Luisito Costa PTA Date: 2022  3

## 2022-08-01 ENCOUNTER — HOSPITAL ENCOUNTER (OUTPATIENT)
Dept: PHYSICAL THERAPY | Age: 62
Setting detail: THERAPIES SERIES
Discharge: HOME | End: 2022-08-01
Payer: COMMERCIAL

## 2022-08-01 PROCEDURE — 97110 THERAPEUTIC EXERCISES: CPT

## 2022-08-01 PROCEDURE — 97140 MANUAL THERAPY 1/> REGIONS: CPT

## 2022-08-01 NOTE — PROGRESS NOTES
Phone: [address]th      Fax: [phone]                            Outpatient Physical Therapy                                                                            Daily Note    Date: 2022  Patient Name: Justus Yang        MRN: [id number]   ACCT#:  [de-identified]  : 1960  (63 y.o.)    Referring Provider (secondary): Dr. Micha Banda         Diagnosis: Left lumbar radiculopathy  Treatment Diagnosis: Left lumbar pain with radiculopathy    Onset Date: 22  PT Insurance Information: BCBS  Total # of Visits Approved: 12 Per Physician Order  Total # of Visits to Date: 9  No Show: 0  Canceled Appointment: 0  Plan of Care/Certification Expiration Date: 22    Pre-Treatment Pain:  3/10     Assessment  Assessment: Pt saw the chiropractor friday. He felt good Saturday. He did a lot of walking on Saturday, had increased knee swelling that evening. Pt reports he does get relief from sessions temporarily. Performed manual therapy for hip mobility/distraction and PROM stretching of hip. Pain 2/10 after session. Pt to see hip and knee ortho on Friday.     Plan  Continue with current plan of care    Exercises/Modalities/Manual:  See DocFlow Sheet              Goals  (Total # of Visits to Date: 5)   Short Term Goals  Time Frame for Short term goals: 6 visits  Short term goal 1: Educate on home program of hip stretching and hip/trunk stabilization ex    Long Term Goals  Time Frame for Long term goals : 12 visits  Long term goal 1: Decrease left LE radicular symptoms by > 75%  Long term goal 2: Decrease left lumbar/gluteal pain to < 3/10 with daily activities and ambulation  Long term goal 3: Improve left hip mobility primarily with IR to 20 deg to reduce stress on lumbar/pelvic regions due to compensation    Post Treatment Pain:  210    Time In: 1032    Time Out : 1102        Timed Code Treatment Minutes: 30 Minutes  Total Treatment Time: 35 Minutes    Patricio Costa PTA Date: 8/1/2022

## 2022-08-03 ENCOUNTER — HOSPITAL ENCOUNTER (OUTPATIENT)
Dept: PHYSICAL THERAPY | Age: 62
Setting detail: THERAPIES SERIES
Discharge: HOME | End: 2022-08-03
Payer: COMMERCIAL

## 2022-08-03 PROCEDURE — 97140 MANUAL THERAPY 1/> REGIONS: CPT

## 2022-08-03 PROCEDURE — 97110 THERAPEUTIC EXERCISES: CPT

## 2022-08-03 ASSESSMENT — PAIN SCALES - GENERAL: PAINLEVEL_OUTOF10: 6

## 2022-08-03 NOTE — PROGRESS NOTES
Phone: Leela Barth      Fax: [phone]                            Outpatient Physical Therapy                                                                            Daily Note    Date: 8/3/2022  Patient Name: Margaret Alvarez        MRN: [id number]   ACCT#:  [de-identified]  : 1960  (63 y.o.)    Referring Provider (secondary): Dr. Mathieu Herndon         Diagnosis: Left lumbar radiculopathy  Treatment Diagnosis: Left lumbar pain with radiculopathy    Onset Date: 22  PT Insurance Information: BCBS  Total # of Visits Approved: 12 Per Physician Order  Total # of Visits to Date: 10  No Show: 0  Canceled Appointment: 0  Plan of Care/Certification Expiration Date: 22    Pre-Treatment Pain:  6/10     Assessment  Assessment: Pt reports he twisted on his left LE  when letting his puppy outside  with increased groin and lateral knee pain. Performed manual therapy/ex for  hip mobility/knee hip flexibility. Pt notes improved hip flexion after session as he was having trouble marching left LE prior. Reassess next visit for ortho appt on friday.     Plan  Continue with current plan of care    Exercises/Modalities/Manual:  See DocFlow Sheet            Goals  (Total # of Visits to Date: 8)   Short Term Goals  Time Frame for Short term goals: 6 visits  Short term goal 1: Educate on home program of hip stretching and hip/trunk stabilization ex    Long Term Goals  Time Frame for Long term goals : 12 visits  Long term goal 1: Decrease left LE radicular symptoms by > 75%  Long term goal 2: Decrease left lumbar/gluteal pain to < 3/10 with daily activities and ambulation  Long term goal 3: Improve left hip mobility primarily with IR to 20 deg to reduce stress on lumbar/pelvic regions due to compensation    Post Treatment Pain:  5/10    Time In: 730    Time Out : 805        Timed Code Treatment Minutes: 35 Minutes  Total Treatment Time: 35 Minutes    Lanie Costa PTA   Date: 8/3/2022

## 2022-08-04 ENCOUNTER — HOSPITAL ENCOUNTER (OUTPATIENT)
Dept: PHYSICAL THERAPY | Age: 62
Setting detail: THERAPIES SERIES
Discharge: HOME | End: 2022-08-04
Payer: COMMERCIAL

## 2022-08-04 PROCEDURE — 97140 MANUAL THERAPY 1/> REGIONS: CPT

## 2022-08-04 NOTE — PROGRESS NOTES
Phone: Leela Barth            Fax: [phone]                             Outpatient Physical Therapy                                                                      Progress Report    Date: 2022   MR[facility]: [id number]    [facility]  Patient: Kamron Palafox  : 1960  Referring Provider : Dr. Garner ([place])         Diagnosis: Left lumbar radiculopathy    Consulting Physician (orthopedics):   Dr. Sue Paz    Treatment Diagnosis: Left lumbar pain with radiculopathy      Assessment: Patient has completed 11 treatment sessions due to left lumbar/SI and hip pain with radicular left lateral knee jt pain. His pain fluctuates from 0-1/10 at rest/sitting up to 6-8/10 with standing and walking or twisting. He ambulates with a cane due to instability of left LE;  his gait is deviated with decreased stride length on LLE. Strength hip flexion 3+/5;  knee extension 4+/5;  abudction 3/5. DTR's 1+ L4 bilaterally and S1 2+ bilaterally. Treatment has consisted of pelvic traction(3 visits);  manual therapy; and education on home exercises. He has shown mild reduction in symptoms from initial evaluation however continues to be limited functionally by left LE pain and instability.    Plan to hold further treatment as patient to ortho consult 22      Plan:   Hold pending MD assessment    Goals  Time Frame for Short term goals: 6 visits  Short term goal 1: Educate on home program of hip stretching and hip/trunk stabilization ex    Time Frame for Long term goals : 12 visits  Long term goal 1: Decrease left LE radicular symptoms by > 75%- NOT MET  Long term goal 2: Decrease left lumbar/gluteal pain to < 3/10 with daily activities and ambulation- NOT MET  Long term goal 3: Improve left hip mobility primarily with IR to 20 deg to reduce stress on lumbar/pelvic regions due to compensation- MET    SOFIE LEWIS, PT    Date: 2022

## 2022-08-04 NOTE — PROGRESS NOTES
Phone: Leela Barth      Fax: [phone]                            Outpatient Physical Therapy                                                                            Daily Note    Date: 2022  Patient Name: Justus Yang        MRN: [id number]   ACCT#:  [de-identified]  : 1960  (63 y.o.)    Referring Provider (secondary): Dr. Micha Banda         Diagnosis: Left lumbar radiculopathy  Treatment Diagnosis: Left lumbar pain with radiculopathy    Onset Date: 22  PT Insurance Information: BCBS  Total # of Visits Approved: 12 Per Physician Order  Total # of Visits to Date: 11  No Show: 0  Canceled Appointment: 0  Plan of Care/Certification Expiration Date: 22    Pre-Treatment Pain:  3/10     Assessment  Assessment: Patient has completed 11 treatment sessions due to left lumbar/SI and hip pain with radicular left lateral knee jt pain. His pain fluctuates from 0-1/10 at rest/sitting up to 6-8/10 with standing and walking or twisting. He ambulates with a cane due to instability of left LE;  his gait is deviated with decreased stride length on LLE. Strength hip flexion 3+/5;  knee extension 4+/5. DTR's 1+ L4 bilaterally and S1 2+ bilaterally. Treatment has consisted of pelvic traction(3 visits);  manual therapy; and education on home exercises. He has shown mild reduction in symptoms from initial evaluation however continues to be limited functionally by left LE pain and instability.   Plan to hold further treatment as patient to ortho consult 22    Plan      Exercises/Modalities/Manual:  See DocFlow Sheet    Education:           Goals  (Total # of Visits to Date: 6)   Short Term Goals  Time Frame for Short term goals: 6 visits  Short term goal 1: Educate on home program of hip stretching and hip/trunk stabilization ex    Long Term Goals  Time Frame for Long term goals : 12 visits  Long term goal 1: Decrease left LE radicular symptoms by > 75%- NOT MET  Long term goal 2: Decrease left lumbar/gluteal pain to < 3/10 with daily activities and ambulation- NOT MET  Long term goal 3: Improve left hip mobility primarily with IR to 20 deg to reduce stress on lumbar/pelvic regions due to compensation- MET    Post Treatment Pain:  2/10    Time In: 0900    Time Out : 0935        Timed Code Treatment Minutes: 35 Minutes  Total Treatment Time: [address] [facility] [address], PT     Date: 8/4/2022

## 2022-08-10 ENCOUNTER — APPOINTMENT (OUTPATIENT)
Dept: PHYSICAL THERAPY | Age: 62
End: 2022-08-10
Payer: COMMERCIAL

## 2022-08-18 NOTE — DISCHARGE SUMMARY
Phone: [address] Barth             Fax: [phone]                            Outpatient Physical Therapy                                                                    Discharge Summary    Patient: Debora Freed  : 1960  ACCT #: [de-identified]   Referring Provider: Dr. Mandi Fritz      Diagnosis: Left lumbar radiculopathy    Date Treatment Initiated: 22  Date of Last Treatment: 22    PT Visit Information  Onset Date: 22  PT Insurance Information: BCBS  Total # of Visits Approved: 12  Total # of Visits to Date: 11  Plan of Care/Certification Expiration Date: 22  No Show: 0  Canceled Appointment: 0    Frequency/Duration  Days: 2 times per week  Weeks: 8 weeks    Treatment Received  Patient Education/HEP, Therapeutic Exercise, Manual Therapy: Mobilization/Manipulation, and Traction         Assessment  Assessment: Patient has completed 11 treatment sessions due to left lumbar/SI and hip pain with radicular left lateral knee jt pain. His pain fluctuates from 0-1/10 at rest/sitting up to 6-8/10 with standing and walking or twisting. He ambulates with a cane due to instability of left LE;  his gait is deviated with decreased stride length on LLE. Strength hip flexion 3+/5;  knee extension 4+/5. DTR's 1+ L4 bilaterally and S1 2+ bilaterally. Treatment has consisted of pelvic traction(3 visits);  manual therapy; and education on home exercises. He has shown mild reduction in symptoms from initial evaluation however continues to be limited functionally by left LE pain and instability. Plan to discharge further treatment as patient to ortho consult 22. Add:   Patient contacted this department stating he was going to receive injection to left hip. Per MD, to discharge further PT. Reason for Discharge  Completion of Prescribed visits and Patient Self Discharge    Comments:   Thank you for this referral      SOFIE LEWIS, PT  Date: 2022

## 2022-09-09 ENCOUNTER — OFFICE VISIT (OUTPATIENT)
Dept: FAMILY MEDICINE CLINIC | Age: 62
End: 2022-09-09
Payer: COMMERCIAL

## 2022-09-09 VITALS
OXYGEN SATURATION: 99 % | SYSTOLIC BLOOD PRESSURE: 160 MMHG | DIASTOLIC BLOOD PRESSURE: 92 MMHG | HEIGHT: 70 IN | HEART RATE: 94 BPM | BODY MASS INDEX: 34.93 KG/M2 | WEIGHT: 244 LBS

## 2022-09-09 DIAGNOSIS — M25.552 LEFT HIP PAIN: ICD-10-CM

## 2022-09-09 DIAGNOSIS — L57.0 ACTINIC KERATOSIS OF RIGHT CHEEK: ICD-10-CM

## 2022-09-09 DIAGNOSIS — E78.00 PURE HYPERCHOLESTEROLEMIA: ICD-10-CM

## 2022-09-09 DIAGNOSIS — Z12.5 SCREENING FOR PROSTATE CANCER: ICD-10-CM

## 2022-09-09 DIAGNOSIS — I10 ESSENTIAL HYPERTENSION: Primary | ICD-10-CM

## 2022-09-09 DIAGNOSIS — R73.01 IFG (IMPAIRED FASTING GLUCOSE): ICD-10-CM

## 2022-09-09 PROCEDURE — 17000 DESTRUCT PREMALG LESION: CPT | Performed by: FAMILY MEDICINE

## 2022-09-09 PROCEDURE — 99214 OFFICE O/P EST MOD 30 MIN: CPT | Performed by: FAMILY MEDICINE

## 2022-09-09 RX ORDER — HYDROCHLOROTHIAZIDE 25 MG/1
25 TABLET ORAL DAILY
Qty: 90 TABLET | Refills: 1 | Status: SHIPPED | OUTPATIENT
Start: 2022-09-09

## 2022-09-09 RX ORDER — LOSARTAN POTASSIUM 100 MG/1
100 TABLET ORAL DAILY
Qty: 90 TABLET | Refills: 1 | Status: SHIPPED | OUTPATIENT
Start: 2022-09-09

## 2022-09-09 ASSESSMENT — PATIENT HEALTH QUESTIONNAIRE - PHQ9
SUM OF ALL RESPONSES TO PHQ QUESTIONS 1-9: 0
2. FEELING DOWN, DEPRESSED OR HOPELESS: 0
1. LITTLE INTEREST OR PLEASURE IN DOING THINGS: 0
SUM OF ALL RESPONSES TO PHQ9 QUESTIONS 1 & 2: 0

## 2022-09-09 NOTE — PROGRESS NOTES
Name: Kamron Palafox  : 1960         Chief Complaint:     Chief Complaint   Patient presents with    Hypertension    Hyperlipidemia    Gastroesophageal Reflux    Arthritis     Had steroid injection yesterday, having hip replacement in November       History of Present Illness:      Kamron Palafox is a 63 y.o.  male who presents with Hypertension, Hyperlipidemia, Gastroesophageal Reflux, and Arthritis (Had steroid injection yesterday, having hip replacement in November)      HPI    L hip and thigh pain, has seen ortho and 8/10 had US-guided L hip injection and then kept 2-wk log of pain in various areas. Strength and stability of L knee, which is s/p TKA, improved but pain in the knee didn't. So yesterday had US-guided L knee injection on the thought that he could be having pain in patella (still his own) or scar tissue in the knee. Again keeping pain log and will f/u in 2 wks. Scheduled for L CAIO 11/15. Will have pre-op eval towards end of October. HTN - BP went up for a couple days after previous injection also. Taking rx as directed and feeling well. Pink spot R cheek which has been present for a number of mos, stays present and rough. Medical History:     Patient Active Problem List   Diagnosis    HTN (hypertension)    GERD (gastroesophageal reflux disease)    Seasonal allergies    Pure hypercholesterolemia    Arthritis    Environmental and seasonal allergies       Medications:       Prior to Admission medications    Medication Sig Start Date End Date Taking? Authorizing Provider   losartan (COZAAR) 100 MG tablet Take 1 tablet by mouth daily 22  Yes Verlon Sprinkles, DO   hydroCHLOROthiazide (HYDRODIURIL) 25 MG tablet Take 1 tablet by mouth daily 22  Yes Verlon Sprinkles, DO   diclofenac (VOLTAREN) 75 MG EC tablet Take 1 tablet by mouth in the morning and 1 tablet before bedtime.  7/18/22 10/16/22 Yes Sarah Avendano, TARA - CNP   aspirin EC 81 MG EC tablet Take 1 tablet by mouth daily 9/1/21  Yes Ev Mota DO   zinc gluconate 50 MG tablet Take 100 mg by mouth daily   Yes Historical Provider, MD   vitamin D (CHOLECALCIFEROL) 25 MCG (1000 UT) TABS tablet Take 1,000 Units by mouth daily   Yes Historical Provider, MD   ascorbic acid (VITAMIN C) 1000 MG tablet Take 1,000 mg by mouth daily   Yes Historical Provider, MD   Omega-3 Fatty Acids (FISH OIL) 1200 MG CPDR Take by mouth   Yes Historical Provider, MD   GARLIC PO Take by mouth   Yes Historical Provider, MD   Loratadine (CLARITIN PO) Take by mouth   Yes Historical Provider, MD   fluticasone Jagruti Makos ALLERGY RELIEF) 50 MCG/ACT nasal spray 1 spray by Nasal route daily 3/9/18  Yes Ev Mota DO        Allergies:       Pneumococcal vaccines, Statins, and Tetracyclines & related    Physical Exam:     Vitals:  BP (!) 160/92   Pulse 94   Ht 5' 10\" (1.778 m)   Wt 244 lb (110.7 kg)   SpO2 99%   BMI 35.01 kg/m²   Physical Exam  Vitals and nursing note reviewed. Constitutional:       General: He is not in acute distress. Appearance: He is well-developed. HENT:      Right Ear: Tympanic membrane and ear canal normal.      Left Ear: Tympanic membrane and ear canal normal.      Nose: Nose normal.      Mouth/Throat:      Mouth: Mucous membranes are moist.      Pharynx: Oropharynx is clear. Eyes:      Conjunctiva/sclera: Conjunctivae normal.   Cardiovascular:      Rate and Rhythm: Normal rate and regular rhythm. Heart sounds: Normal heart sounds. Pulmonary:      Effort: Pulmonary effort is normal.      Breath sounds: Normal breath sounds. Musculoskeletal:      Cervical back: Neck supple. Lymphadenopathy:      Cervical: No cervical adenopathy. Skin:     General: Skin is warm and dry. Comments: R cheek pink macule, slightly rough, just under 1 cm diameter. Tx with cryo for 3 freeze-thaw cycles, tolerated well. Neurological:      Mental Status: He is alert and oriented to person, place, and time.    Psychiatric: Mood and Affect: Mood normal.         Behavior: Behavior normal.       Data:     Lab Results   Component Value Date/Time     03/29/2021 12:21 PM    K 4.1 03/29/2021 12:21 PM     03/29/2021 12:21 PM    CO2 24 03/29/2021 12:21 PM    BUN 13 03/29/2021 12:21 PM    CREATININE 0.82 03/29/2021 12:21 PM    GLUCOSE 108 03/29/2021 12:21 PM    PROT 7.1 03/29/2021 12:21 PM    LABALBU 4.1 03/29/2021 12:21 PM    BILITOT 0.63 03/29/2021 12:21 PM    ALKPHOS 86 03/29/2021 12:21 PM    AST 13 03/29/2021 12:21 PM    ALT 33 03/29/2021 12:21 PM     Lab Results   Component Value Date/Time    WBC 9.5 03/29/2021 12:21 PM    RBC 5.57 03/29/2021 12:21 PM    HGB 16.5 03/29/2021 12:21 PM    HCT 48.6 03/29/2021 12:21 PM    MCV 87.4 03/29/2021 12:21 PM    MCH 29.6 03/29/2021 12:21 PM    MCHC 33.9 03/29/2021 12:21 PM    RDW 13.2 03/29/2021 12:21 PM     03/29/2021 12:21 PM    MPV NOT REPORTED 03/29/2021 12:21 PM     Lab Results   Component Value Date/Time    TSH 1.65 08/18/2017 12:00 AM     Lab Results   Component Value Date/Time    CHOL 198 09/11/2020 07:19 AM    CHOL 229 08/18/2017 12:00 AM    HDL 39 09/11/2020 07:19 AM    PSA 2.32 09/11/2020 07:19 AM    LABA1C 5.8 08/31/2018 07:22 AM         Assessment & Plan:        Diagnosis Orders   1. Essential hypertension        2. Left hip pain        3. Actinic keratosis of right cheek  69332 - AR DESTRUC PREMALIGNANT, FIRST LESION      4. Pure hypercholesterolemia  Lipid Panel      5. Screening for prostate cancer  PSA Screening      6. IFG (impaired fasting glucose)  Hemoglobin A1C        HTN mildly uncontrolled r/t steroid admin. Cont same rx and monitor over next few days. Chronic L hip pain, does have severe OA, also has significant pain in thigh and anterior knee, some of which did not respond to hip injection. However, injection did help with most of his pain. Discussed poss diagnoses, further eval (lumbar MRI). Do agree with proceeding with CAIO.   RYNE curtis with cryo, f/u prn  HLD but doesn't tolerate statins. Update labs.      Requested Prescriptions     Signed Prescriptions Disp Refills    losartan (COZAAR) 100 MG tablet 90 tablet 1     Sig: Take 1 tablet by mouth daily    hydroCHLOROthiazide (HYDRODIURIL) 25 MG tablet 90 tablet 1     Sig: Take 1 tablet by mouth daily         There are no Patient Instructions on file for this visit.      signed by Leda March DO on 9/12/2022 at 10:47 PM  [address]  Dept: [phone]

## 2022-11-17 ENCOUNTER — HOSPITAL ENCOUNTER (OUTPATIENT)
Dept: PHYSICAL THERAPY | Age: 62
Setting detail: THERAPIES SERIES
Discharge: HOME | End: 2022-11-17
Payer: COMMERCIAL

## 2022-11-17 PROCEDURE — 97162 PT EVAL MOD COMPLEX 30 MIN: CPT

## 2022-11-17 ASSESSMENT — PAIN SCALES - GENERAL: PAINLEVEL_OUTOF10: 2

## 2022-11-17 NOTE — PLAN OF CARE
[facility] DILLAN TRUJILLO       Phone: [phone]   Date: 2022                      Outpatient Physical Therapy  Fax: [phone]    ACCT #: [de-identified]                     Plan of Care  [facility]#: [id number]  Patient: Katalina Alejandro  : 1960    Referring Provider : Dr. Violeta Milan    Diagnosis: Left THR  Onset Date: 11/15/22  Treatment Diagnosis: Left  hip weakness    Assessment: Patient underwent elective left THR 11/15/22;  currently he is ambulating with wh walker WBAT however demonstrates mild inward rotation of left hip due to weakness. Plan to progress with strengthening and ambulation activities to improve functional mobility  Therapy Prognosis: Good    Treatment Plan :   Days: 2 times per week Weeks: 8 weeks      Patient Education/HEP, Therapeutic Exercise, Manual Therapy, Gait Training, HP/CP, and Electrical Stimulation     Goals  Time Frame for Short Term Goals: 3 visits  Short Term Goal 1: Educate/upgrade home program for left hip/LE strengthening and proper gait pattern    Time Frame for Long Term Goals : 16 visits  Long Term Goal 1: Ambulate without device in home;  cane in community as needed  Long Term Goal 2: Left hip abduction strength 4+/5 to ambulate without deviation due to hip weakness  Long Term Goal 3: Left knee extension strength 4+/5 to complete reciprical stair amb  Long Term Goal 4: Subjective improvement in functional mobility with LEFS > 50/80     SOFIE LEWIS, HELGA   Date: 2022    ______________________________________ Date: 2022  Physician Signature  By signing above or cosigning electronically, I have reviewed this Plan of Care and certify a need for medically necessary rehabilitation services.

## 2022-11-17 NOTE — THERAPY EVALUATION
Phone: [phone]         Fax: [phone]                      Outpatient Physical Therapy                                                                      Evaluation    Date: 2022  Patient: Chanelle Christianson  : 1960  ACCT #: [de-identified]    Referring Provider : Dr. Estephanie Gordillo    Diagnosis: Left THR    Treatment Diagnosis: Left  hip weakness  Onset Date: 11/15/22  PT Insurance Information: [place] [employer]    Per Physician Order  Total # of Visits to Date: 1  No Show: 0  Canceled Appointment: 0     Subjective     Additional Pertinent Hx: Underwent elective left THR on 11/15/22. Returned home same day. Using wh walker WBAT. Anterior approach for surgery. Currently taking pain meds on consistent basis;  wearing compression pumps. Return MD appt in 6 weeks. LEFS = . Patient with right hip pain with tentative right THR in January/3 months post.    PMHX  includes left TKR ;  right TKR ;  lumbar pain  Pain Assessment  Pain Level: 2     IADL History  Active : Yes (currently not driving following recent surgery)  Occupation: Retired  Leisure & Hobbies: Normally active with daily household tasks    Objective  Vision  Vision: Within Functional Limits  Hearing  Hearing: Within functional limits  Observation/Palpation  Posture: Fair  Observation: Healing incisional area anterior left hip     Strength LLE  L Hip Flexion: 3/5  L Hip ABduction: 3-/5  L Knee Flexion: 3/5  L Knee Extension: 4/5  AROM LLE (degrees)  LLE AROM : WFL  LLE General AROM: Active assisted mobility functional       AROM LLE (degrees)  LLE AROM : WFL  LLE General AROM: Active assisted mobility functional                          WB Status: Ambulating with wh walker WBAT LLE;  exhibits mild IR of left hip during gait due to weakness.   Educated on step through pattern        Balance  Sitting - Static: Good  Sitting - Dynamic: Good  Standing - Static: Good    Assessment: Patient underwent elective left THR 11/15/22;  currently he is ambulating with wh walker WBAT however demonstrates mild inward rotation of left hip due to weakness. Plan to progress with strengthening and ambulation activities to improve functional mobility  Therapy Prognosis: Good    Clinical Presentation:  Evolving  The Following Comorbities will impact the patients progression and Plan of Care:   Previous Orthopedic Injury/Surgery          Medium Complexity    Education: Educated on PT POC;  reviewed home exercises and activity level.    Issued written HEP Access Code [email or web address]        Learning  Does the patient/guardian have any barriers to learning?: No barriers  Will there be a co-learner?: No  What is the preferred language of the patient/guardian?: English  Is an  required?: No    Goals  Short Term Goals  Time Frame for Short Term Goals: 3 visits  Short Term Goal 1: Educate/upgrade home program for left hip/LE strengthening and proper gait pattern    Long Term Goals  Time Frame for Long Term Goals : 16 visits  Long Term Goal 1: Ambulate without device in home;  cane in community as needed  Long Term Goal 2: Left hip abduction strength 4+/5 to ambulate without deviation due to hip weakness  Long Term Goal 3: Left knee extension strength 4+/5 to complete reciprical stair amb  Long Term Goal 4: Subjective improvement in functional mobility with LEFS > 50/80    Patient's Goal:   Get back to walking and activity without pain     Total Time:  50 minutes   Timed Treatment:  0 minutes     Time In: 1000  Time Out: [address], PT Date: 11/17/2022

## 2022-11-21 ENCOUNTER — HOSPITAL ENCOUNTER (OUTPATIENT)
Dept: PHYSICAL THERAPY | Age: 62
Setting detail: THERAPIES SERIES
Discharge: HOME | End: 2022-11-21
Payer: COMMERCIAL

## 2022-11-21 PROCEDURE — 97110 THERAPEUTIC EXERCISES: CPT

## 2022-11-21 ASSESSMENT — PAIN SCALES - GENERAL: PAINLEVEL_OUTOF10: 2

## 2022-11-21 NOTE — PROGRESS NOTES
Physical Therapy  Phone: Leela Barth      Fax: [phone]                            Outpatient Physical Therapy                                                                            Daily Note    Date: 2022  Patient Name: Carlitos Rodas        MRN: [id number]   ACCT#:  [de-identified]  : 1960  (63 y.o.)    Referring Provider (secondary): Dr. Subhash Glover         Diagnosis: Left THR  Treatment Diagnosis: Left  hip weakness    Onset Date: 11/15/22  PT Insurance Information: [employer]    Per Physician Order  Total # of Visits to Date: 2  No Show: 0  Canceled Appointment: 0  Plan of Care/Certification Expiration Date: 23    Pre-Treatment Pain:  2/10     Assessment  Assessment: Patient rates pain today as 2/10. Primarily complains of \"burning \" sensation in L hip. Also complains of edema but reports using ice at home. Patient ambulates into department with straight cane. States he began using it yesterday. Compliant with current HEP and denies any concerns or questions. Completes exercise as outlined above with good tolerance.     Plan  Continue with current plan of care    Exercises/Modalities/Manual:  See DocFlow Sheet    Education:           Goals  (Total # of Visits to Date: 2)   Short Term Goals  Time Frame for Short Term Goals: 3 visits  Short Term Goal 1: Educate/upgrade home program for left hip/LE strengthening and proper gait pattern    Long Term Goals  Time Frame for Long Term Goals : 16 visits  Long Term Goal 1: Ambulate without device in home;  cane in community as needed  Long Term Goal 2: Left hip abduction strength 4+/5 to ambulate without deviation due to hip weakness  Long Term Goal 3: Left knee extension strength 4+/5 to complete reciprical stair amb  Long Term Goal 4: Subjective improvement in functional mobility with LEFS > 50/80    Post Treatment Pain:  210    Time In: 0830    Time Out : 900        Timed Code Treatment Minutes: 30 Minutes    Total Treatment Time: 30 Minutes    Lisette Prior     Date: 11/21/2022

## 2022-11-25 ENCOUNTER — HOSPITAL ENCOUNTER (OUTPATIENT)
Dept: PHYSICAL THERAPY | Age: 62
Setting detail: THERAPIES SERIES
Discharge: HOME | End: 2022-11-25
Payer: COMMERCIAL

## 2022-11-25 PROCEDURE — 97110 THERAPEUTIC EXERCISES: CPT

## 2022-11-25 ASSESSMENT — PAIN SCALES - GENERAL: PAINLEVEL_OUTOF10: 1

## 2022-11-25 NOTE — PROGRESS NOTES
Phone: Leela Barth      Fax: [phone]                            Outpatient Physical Therapy                                                                            Daily Note    Date: 2022  Patient Name: Augusta Hitchcock        MRN: [id number]   ACCT#:  [de-identified]  : 1960  (63 y.o.)    Referring Provider : Dr. Nathan Rae         Diagnosis: Left THR  Treatment Diagnosis: Left  hip weakness    Onset Date: 11/15/22  PT Insurance Information: [employer]ii Chavez    Per Physician Order  Total # of Visits to Date: 3  No Show: 0  Canceled Appointment: 0  Plan of Care/Certification Expiration Date: 23    Pre-Treatment Pain:  1/10     Assessment  Assessment: Patient reports left hip pain 1/10;  right hip pain 4/10. Ambulating with cane with mild gait deviation;  less left LE IR noted however exhibits trunk deviation to right. Educated on upright posture and full knee extension /terminal ext. Good tolerance to ex; Compliant with home clamshells with difficulty. Progress with strengthening. Issued orange tband for standing hip abd/toe taps and tband resisted walking.   Will pursue [employer] authorization    Plan  Continue with current plan of care    Exercises/Modalities/Manual:  See DocFlow Sheet    Education: Orange tband walking          Goals  (Total # of Visits to Date: 3)   Short Term Goals  Time Frame for Short Term Goals: 3 visits  Short Term Goal 1: Educate/upgrade home program for left hip/LE strengthening and proper gait pattern- MET    Long Term Goals  Time Frame for Long Term Goals : 16 visits  Long Term Goal 1: Ambulate without device in home;  cane in community as needed  Long Term Goal 2: Left hip abduction strength 4+/5 to ambulate without deviation due to hip weakness  Long Term Goal 3: Left knee extension strength 4+/5 to complete reciprical stair amb  Long Term Goal 4: Subjective improvement in functional mobility with LEFS > 50/80    Post Treatment Pain:  1/10    Time In: 1300    Time Out : 1340        Timed Code Treatment Minutes: 35 Minutes    Total Time:   36 Minutes    SOFIE LEWIS, PT     Date: 11/25/2022

## 2022-11-28 ENCOUNTER — HOSPITAL ENCOUNTER (OUTPATIENT)
Dept: PHYSICAL THERAPY | Age: 62
Setting detail: THERAPIES SERIES
Discharge: HOME | End: 2022-11-28
Payer: COMMERCIAL

## 2022-11-28 PROCEDURE — 97110 THERAPEUTIC EXERCISES: CPT

## 2022-11-30 ENCOUNTER — HOSPITAL ENCOUNTER (OUTPATIENT)
Dept: PHYSICAL THERAPY | Age: 62
Setting detail: THERAPIES SERIES
Discharge: HOME | End: 2022-11-30
Payer: COMMERCIAL

## 2022-11-30 PROCEDURE — 97110 THERAPEUTIC EXERCISES: CPT

## 2022-11-30 ASSESSMENT — PAIN SCALES - GENERAL: PAINLEVEL_OUTOF10: 0

## 2022-11-30 NOTE — PROGRESS NOTES
Physical Therapy  Phone: Leela Barth      Fax: [phone]                            Outpatient Physical Therapy                                                                            Daily Note    Date: 2022  Patient Name: Pema Chaves        MRN: [id number]   ACCT#:  [de-identified]  : 1960  (63 y.o.)    Referring Provider (secondary): Dr. Marialuisa Collins         Diagnosis: Left THR  Treatment Diagnosis: Left  hip weakness    Onset Date: 11/15/22  PT Insurance Information: [employer]    Per Physician Order  Total # of Visits to Date: 5  No Show: 0  Canceled Appointment: 0  Plan of Care/Certification Expiration Date: 23    Pre-Treatment Pain:  0/10     Assessment  Assessment: Patient denies pain in L hip but notes pain is 3-4/10 in R hip. Continues to switch hands for cane due to pain in R hip. Completes exercises as outlined above with good tolerance. Per insurance coverage - patient has 20 total visits of PT through calendar year. Patient had utilized 11 sessions prior to undergoing recent THR and at present has 4 visits remaining through end of 2022. Patient has insurance authorization until 22 and then will require prior authorization for remaining 4 visits.   Plan  Continue with current plan of care    Exercises/Modalities/Manual:  See DocFlow Sheet    Education:           Goals  (Total # of Visits to Date: 5)   Short Term Goals  Time Frame for Short Term Goals: 3 visits  Short Term Goal 1: Educate/upgrade home program for left hip/LE strengthening and proper gait pattern- MET    Long Term Goals  Time Frame for Long Term Goals : 16 visits  Long Term Goal 1: Ambulate without device in home;  cane in community as needed  Long Term Goal 2: Left hip abduction strength 4+/5 to ambulate without deviation due to hip weakness  Long Term Goal 3: Left knee extension strength 4+/5 to complete reciprical stair amb  Long Term Goal 4: Subjective improvement in functional mobility with LEFS > 50/80    Post Treatment Pain:  1/10    Time In: 0915    Time Out : 0950        Timed Code Treatment Minutes: 35 Minutes  Total Treatment Time: 35 Minutes    Radha Acevedo     Date: 11/30/2022

## 2022-12-05 ENCOUNTER — HOSPITAL ENCOUNTER (OUTPATIENT)
Dept: PHYSICAL THERAPY | Age: 62
Setting detail: THERAPIES SERIES
Discharge: HOME | End: 2022-12-05
Payer: COMMERCIAL

## 2022-12-05 PROCEDURE — 97110 THERAPEUTIC EXERCISES: CPT

## 2022-12-05 PROCEDURE — 97112 NEUROMUSCULAR REEDUCATION: CPT

## 2022-12-05 NOTE — PROGRESS NOTES
Phone: Leela Barth      Fax: [phone]                            Outpatient Physical Therapy                                                                            Daily Note    Date: 2022  Patient Name: Augusta Hitchcock        MRN: [id number]   ACCT#:  [de-identified]  : 1960  (63 y.o.)    Referring Provider (secondary): Dr. Nathan Rae         Diagnosis: Left THR  Treatment Diagnosis: Left  hip weakness    Onset Date: 11/15/22  PT Insurance Information: [employer]    Per Physician Order  Total # of Visits to Date: 6  No Show: 0  Canceled Appointment: 0  Plan of Care/Certification Expiration Date: 23    Pre-Treatment Pain:  0/10     Assessment  Assessment: Pt  denies pain in left hip. Notes after ex's pain may go to .5/10. Primary c/o pain in right hip. Pt is using s-cane in community and home primarily for right LE. Pt now driving and workin 1/2 days with no difficulty. Pt has experienced a pop in left hip 3x since having replacement  when pulling his sock off from behind when seated, has not happened in last week and is longer removing his sock in this position. Progressed ex with proprioception on aeromat, step up and wall slide with good ji. May add ball rolls next visit.     Plan  Continue with current plan of care    Exercises/Modalities/Manual:  See DocFlow Sheet              Goals  (Total # of Visits to Date: 6)   Short Term Goals  Time Frame for Short Term Goals: 3 visits  Short Term Goal 1: Educate/upgrade home program for left hip/LE strengthening and proper gait pattern- MET    Long Term Goals  Time Frame for Long Term Goals : 16 visits  Long Term Goal 1: Ambulate without device in home;  cane in community as needed  Long Term Goal 2: Left hip abduction strength 4+/5 to ambulate without deviation due to hip weakness  Long Term Goal 3: Left knee extension strength 4+/5 to complete reciprical stair amb  Long Term Goal 4: Subjective improvement in functional mobility with LEFS > 50/80    Post Treatment Pain:  0/10    Time In: 0735    Time Out : 0810        Timed Code Treatment Minutes: 35 Minutes  Total Treatment Time: 35 Minutes    Shawn Costa PTA   Date: 12/5/2022

## 2022-12-07 ENCOUNTER — APPOINTMENT (OUTPATIENT)
Dept: PHYSICAL THERAPY | Age: 62
End: 2022-12-07
Payer: COMMERCIAL

## 2022-12-12 ENCOUNTER — HOSPITAL ENCOUNTER (OUTPATIENT)
Dept: PHYSICAL THERAPY | Age: 62
Setting detail: THERAPIES SERIES
Discharge: HOME | End: 2022-12-12
Payer: COMMERCIAL

## 2022-12-12 PROCEDURE — 97110 THERAPEUTIC EXERCISES: CPT

## 2022-12-12 NOTE — PROGRESS NOTES
Phone: [address]      Fax: [phone]                            Outpatient Physical Therapy                                                                            Daily Note    Date: 2022  Patient Name: Mi Sim        MRN: [id number]   ACCT#:  [de-identified]  : 1960  (63 y.o.)    Referring Provider (secondary): Dr. Desire Duran         Diagnosis: Left THR  Treatment Diagnosis: Left  hip weakness    Onset Date: 11/15/22  PT Insurance Information: [employer] Hatch    Per Physician Order  Total # of Visits to Date: 7  No Show: 0  Canceled Appointment: 0  Plan of Care/Certification Expiration Date: 23    Pre-Treatment Pain:  0/10     Assessment  Assessment: Pt reports his hip popped again yesterday when he  bent over to  an object from the ground level. No pain with pop. Pain 0/10 left LE. Primary pain in right , having R CAIO in 6 weeks. Pt able to perform steps reciprically. Hip abduction strength 4+/5 R with MMT. Issued LEFS to return for DC next visit.     Plan  Continue with current plan of care    Exercises/Modalities/Manual:  See DocFlow Sheet            Goals  (Total # of Visits to Date: 7)   Short Term Goals  Time Frame for Short Term Goals: 3 visits  Short Term Goal 1: Educate/upgrade home program for left hip/LE strengthening and proper gait pattern- MET    Long Term Goals  Time Frame for Long Term Goals : 16 visits  Long Term Goal 1: Ambulate without device in home;  cane in community as needed-not met d/t Right hip  Long Term Goal 2: Left hip abduction strength 4+/5 to ambulate without deviation due to hip weakness-met  Long Term Goal 3: Left knee extension strength 4+/5 to complete reciprical stair amb-met  Long Term Goal 4: Subjective improvement in functional mobility with LEFS > 50/80    Post Treatment Pain:  0/10    Time In: 0945    Time Out : 1015        Timed Code Treatment Minutes: 30 Minutes  Total Treatment Time: 30 Minutes    Dyan Vicente Julissa  PTA   Date: 12/12/2022

## 2022-12-21 ENCOUNTER — HOSPITAL ENCOUNTER (OUTPATIENT)
Dept: PHYSICAL THERAPY | Age: 62
Setting detail: THERAPIES SERIES
Discharge: HOME | End: 2022-12-21
Payer: COMMERCIAL

## 2022-12-21 PROCEDURE — 97110 THERAPEUTIC EXERCISES: CPT

## 2022-12-21 NOTE — DISCHARGE SUMMARY
Phone: [address]             Fax: [phone]                            Outpatient Physical Therapy                                                                    Discharge Summary    Patient: Oneil Gomes  : 1960  ACCT #: [de-identified]   Referring Provider : Dr. Alda Saleh      Diagnosis: Left THR    Date Treatment Initiated: 22  Date of Last Treatment: 22    PT Visit Information  Onset Date: 11/15/22  PT Insurance Information: [employer]  Total # of Visits to Date: 8  Plan of Care/Certification Expiration Date: 23  No Show: 0  Canceled Appointment: 0  Referring Provider (secondary): Dr. Alda Saleh    Treatment Received:   Patient Education/HEP, Therapeutic Exercise, and Gait Training    Assessment: Patient has completed 8 treatment sessions consisting of left hip strengthening following a recent left THR. He has progressed very well and subjectively notes no left hip pain. Strength surrounding left hip graded 4+-5/5. He is ambulating using a cane in community primarily due to progressive right hip pain. Patient is scheduled to undergo right THR 23. Based on his present status of left hip, plan to discharge with home program.   Goals essentially met for left hip strength and mobility and residual gait or activity limitations are due to right hip pain and dysfunction     Reason for Discharge:   Goals Met and Optimal Function Achieved    Comments:   Thank you for this referral      SOFIE LEWIS, PT  Date: 2022

## 2022-12-21 NOTE — PROGRESS NOTES
Phone: Leela Barth      Fax: [phone]                            Outpatient Physical Therapy                                                                            Daily Note    Date: 2022  Patient Name: Edward Paniagua        MRN: [id number]   ACCT#:  [de-identified]  : 1960  (63 y.o.)    Referring Provider: Dr. Helen Edward         Diagnosis: Left THR  Treatment Diagnosis: Left  hip weakness    Onset Date: 11/15/22  PT Insurance Information: [employer]    Per Physician Order  Total # of Visits to Date: 8  No Show: 0  Canceled Appointment: 0  Plan of Care/Certification Expiration Date: 23    Pre-Treatment Pain:  0/10 Left hip   (right hip 6-7/10)     Assessment  Assessment: Patient has completed 8 treatment sessions consisting of left hip strengthening following a recent left THR. He has progressed very well and subjectively notes no left hip pain. Strength surrounding left hip graded 4+-5/5. He is ambulating using a cane in community primarily due to progressive right hip pain. Patient is scheduled to undergo right THR 23.    Based on his present status of left hip, plan to discharge with home program.   Goals essentially met for left hip strength and mobility and residual gait or activity limitations are due to right hip pain and dysfunction    Plan  Discharge    Exercises/Modalities/Manual:  See DocFlow Sheet    Education: Reviewed home exercises          Goals  (Total # of Visits to Date: 8)   Short Term Goals  Time Frame for Short Term Goals: 3 visits  Short Term Goal 1: Educate/upgrade home program for left hip/LE strengthening and proper gait pattern- MET    Long Term Goals  Time Frame for Long Term Goals : 16 visits  Long Term Goal 1: Ambulate without device in home;  cane in community as needed-not met d/t Right hip  Long Term Goal 2: Left hip abduction strength 4+/5 to ambulate without deviation due to hip weakness-met  Long Term Goal 3: Left knee extension strength 4+/5 to complete reciprical stair amb-met  Long Term Goal 4: Subjective improvement in functional mobility with LEFS > 50/80- NOT MET    Post Treatment Pain:  0/10    Time In: 0730    Time Out : 0800        Timed Code Treatment Minutes: 30 Minutes  Total Treatment Time: 30 Minutes    YTPTBFS GSDTM, PT     Date: 12/21/2022

## 2022-12-27 ENCOUNTER — OFFICE VISIT (OUTPATIENT)
Dept: FAMILY MEDICINE CLINIC | Age: 62
End: 2022-12-27
Payer: COMMERCIAL

## 2022-12-27 VITALS
HEART RATE: 95 BPM | HEIGHT: 70 IN | DIASTOLIC BLOOD PRESSURE: 98 MMHG | WEIGHT: 248 LBS | OXYGEN SATURATION: 98 % | BODY MASS INDEX: 35.5 KG/M2 | SYSTOLIC BLOOD PRESSURE: 160 MMHG

## 2022-12-27 DIAGNOSIS — I10 ESSENTIAL HYPERTENSION: ICD-10-CM

## 2022-12-27 DIAGNOSIS — R50.9 ACUTE FEBRILE ILLNESS: Primary | ICD-10-CM

## 2022-12-27 DIAGNOSIS — R25.1 TREMOR: ICD-10-CM

## 2022-12-27 LAB
INFLUENZA A ANTIGEN, POC: NEGATIVE
INFLUENZA B ANTIGEN, POC: NEGATIVE
LOT NUMBER POC: NORMAL
SARS-COV-2 RNA POC - COV: NORMAL
VALID INTERNAL CONTROL, POC: NORMAL
VENDOR AND KIT NAME POC: NORMAL

## 2022-12-27 PROCEDURE — 3078F DIAST BP <80 MM HG: CPT | Performed by: FAMILY MEDICINE

## 2022-12-27 PROCEDURE — 99214 OFFICE O/P EST MOD 30 MIN: CPT | Performed by: FAMILY MEDICINE

## 2022-12-27 PROCEDURE — 3074F SYST BP LT 130 MM HG: CPT | Performed by: FAMILY MEDICINE

## 2022-12-27 RX ORDER — TRAMADOL HYDROCHLORIDE 50 MG/1
TABLET ORAL
COMMUNITY
Start: 2022-11-15

## 2022-12-27 RX ORDER — METOPROLOL SUCCINATE 25 MG/1
25 TABLET, EXTENDED RELEASE ORAL DAILY
Qty: 30 TABLET | Refills: 3 | Status: SHIPPED | OUTPATIENT
Start: 2022-12-27

## 2022-12-27 RX ORDER — AMOXICILLIN AND CLAVULANATE POTASSIUM 875; 125 MG/1; MG/1
1 TABLET, FILM COATED ORAL 2 TIMES DAILY
Qty: 20 TABLET | Refills: 0 | Status: SHIPPED | OUTPATIENT
Start: 2022-12-27 | End: 2023-01-06

## 2022-12-27 RX ORDER — CELECOXIB 200 MG/1
CAPSULE ORAL
COMMUNITY
Start: 2022-12-17

## 2022-12-27 NOTE — PROGRESS NOTES
Name: Noe Horton  : 1960         Chief Complaint:     Chief Complaint   Patient presents with    URI     Sinus pressure, drainage, sore throat, cough, fever. 4 days. History of Present Illness:      Noe Horton is a 63 y.o.  male who presents with URI (Sinus pressure, drainage, sore throat, cough, fever. 4 days. )      HPI    Pt c/o upper resp symptoms, onset 4 days ago. Many sick contacts recently incl people dx with sinus infection, bronchitis. He's been coughing and blowing from nose green mucus. No body aches. Mild chest discomfort r/t coughing. Yesterday started with elevated temp, highest 100. 2. has been taking mucinex, claritin. At times has had some chest tightness, not tight enough to where he feels he needs albuterol. Has  inhaler at home. 6 wks s/p L CAIO and has been doing very well, happy with outcome. Still ambulating with cane actually because of R hip pain with ambulation. The R sided pain had started in approx October, didn't respond at all to intraarticular injection, scheduled for R CAIO in 2 wks. Medical History:     Patient Active Problem List   Diagnosis    HTN (hypertension)    GERD (gastroesophageal reflux disease)    Seasonal allergies    Pure hypercholesterolemia    Arthritis    Environmental and seasonal allergies       Medications:       Prior to Admission medications    Medication Sig Start Date End Date Taking?  Authorizing Provider   amoxicillin-clavulanate (AUGMENTIN) 875-125 MG per tablet Take 1 tablet by mouth 2 times daily for 10 days 22 Yes Radha Lara, DO   metoprolol succinate (TOPROL XL) 25 MG extended release tablet Take 1 tablet by mouth daily 22  Yes Radha Lara,    traMADol (ULTRAM) 50 MG tablet TAKE 1-2 TABLETS BY MOUTH EVERY 6 HOURS AS NEEDED FOR PAIN 11/15/22   Historical Provider, MD   celecoxib (CELEBREX) 200 MG capsule TAKE 1 CAPSULE BY MOUTH EVERY DAY 22   Historical Provider, MD   losartan (COZAAR) 100 MG tablet Take 1 tablet by mouth daily 9/9/22   Jazmin Claudetle, DO   hydroCHLOROthiazide (HYDRODIURIL) 25 MG tablet Take 1 tablet by mouth daily 9/9/22   Jazmin Arcetle, DO   aspirin EC 81 MG EC tablet Take 1 tablet by mouth daily 9/1/21   Love Claudetle, DO   zinc gluconate 50 MG tablet Take 100 mg by mouth daily    Historical Provider, MD   vitamin D (CHOLECALCIFEROL) 25 MCG (1000 UT) TABS tablet Take 1,000 Units by mouth daily    Historical Provider, MD   ascorbic acid (VITAMIN C) 1000 MG tablet Take 1,000 mg by mouth daily    Historical Provider, MD   Omega-3 Fatty Acids (FISH OIL) 1200 MG CPDR Take by mouth    Historical Provider, MD   GARLIC PO Take by mouth    Historical Provider, MD   Loratadine (CLARITIN PO) Take by mouth    Historical Provider, MD   fluticasone Matilda Few ALLERGY RELIEF) 50 MCG/ACT nasal spray 1 spray by Nasal route daily 3/9/18   Jazmin Luciano, DO        Allergies:       Pneumococcal vaccines, Statins, and Tetracyclines & related    Physical Exam:     Vitals:  BP (!) 160/98   Pulse 95   Ht 5' 10\" (1.778 m)   Wt 248 lb (112.5 kg)   SpO2 98%   BMI 35.58 kg/m²   Physical Exam  Vitals and nursing note reviewed. Constitutional:       General: He is not in acute distress. Appearance: He is well-developed. HENT:      Right Ear: Tympanic membrane and ear canal normal.      Left Ear: Tympanic membrane and ear canal normal.      Nose: Nose normal.      Mouth/Throat:      Mouth: Mucous membranes are moist.      Pharynx: Oropharynx is clear. Eyes:      Conjunctiva/sclera: Conjunctivae normal.   Cardiovascular:      Rate and Rhythm: Normal rate and regular rhythm. Heart sounds: Normal heart sounds. Pulmonary:      Effort: Pulmonary effort is normal.      Breath sounds: Normal breath sounds. Musculoskeletal:      Cervical back: Neck supple. Lymphadenopathy:      Cervical: No cervical adenopathy. Skin:     General: Skin is warm and dry.    Neurological: advised to pay attention to it next time he drinks alcohol. Requested Prescriptions     Signed Prescriptions Disp Refills    amoxicillin-clavulanate (AUGMENTIN) 875-125 MG per tablet 20 tablet 0     Sig: Take 1 tablet by mouth 2 times daily for 10 days    metoprolol succinate (TOPROL XL) 25 MG extended release tablet 30 tablet 3     Sig: Take 1 tablet by mouth daily         Patient Instructions   SURVEY:    You may be receiving a survey from [employer] regarding your visit today. You may get this in the mail, through your MyChart or in your email. Please complete the survey to enable us to provide the highest quality of care to you and your family. If you cannot score us as very good ( 5 Stars) on any question, please feel free to call the office to discuss how we could have made your experience exceptional.     Thank you.     Clinical Care Team:  DO Sarah Aguila [facility], [address] [place] [address] Team:  Kalyn Mejia      signed by Rhett Bryan DO on 12/27/2022 at 11:47 PM  [address]  Dept: [phone]

## 2022-12-27 NOTE — PATIENT INSTRUCTIONS
SURVEY:    You may be receiving a survey from [employer] regarding your visit today. You may get this in the mail, through your MyChart or in your email. Please complete the survey to enable us to provide the highest quality of care to you and your family. If you cannot score us as very good ( 5 Stars) on any question, please feel free to call the office to discuss how we could have made your experience exceptional.     Thank you.     Clinical Care Team:  Dr. Brigida Jaime, DO Maribel Mahmood, [address] [place] [address] Team:  [address] [place] [address]

## 2023-01-26 ENCOUNTER — HOSPITAL ENCOUNTER (OUTPATIENT)
Dept: PHYSICAL THERAPY | Age: 63
Setting detail: THERAPIES SERIES
Discharge: HOME | End: 2023-01-26
Payer: COMMERCIAL

## 2023-01-26 PROCEDURE — 97162 PT EVAL MOD COMPLEX 30 MIN: CPT

## 2023-01-26 ASSESSMENT — PAIN SCALES - GENERAL: PAINLEVEL_OUTOF10: 2

## 2023-01-26 NOTE — PLAN OF CARE
[facility] DILLAN TRUJILLO       Phone: [phone]   Date: 2023                      Outpatient Physical Therapy  Fax: [phone]    ACCT #: [de-identified]                     Plan of Care  [facility]#: [id number]  Patient: Celestino Ellis  : 1960    Referring Provider : Dr. Antonette Ocasio    Diagnosis: Right THR  Onset Date: 23  Treatment Diagnosis: Right hip weakness    Assessment  Assessment: Patient underwent elective right THR(anterior approach) 23;  currently he is ambulating with wh walker. Patient was educated on home exercise program and activity level along with ice/elevation. Plan to progress with strengthening and gait activities  Therapy Prognosis: Good    Treatment Plan :   Days: 2 times per week   8 weeks      Patient Education/HEP, Therapeutic Exercise, and Gait Training     Goals  Time Frame for Short Term Goals: 5 visits  Short Term Goal 1: Educate/upgrade home program for right hip/LE strengthening     Time Frame for Long Term Goals : 12 visits  Long Term Goal 1: Ambulate in home without device;  cane in community as needed  Long Term Goal 2: Right hip abduction strength 4+/5 to ambulate without deviation due to hip weakness  Long Term Goal 3: Right knee extension strength 4+/5 to complete reciprical stair amb  Long Term Goal 4: Subjective improvement in functional mobility with LEFS > 50/80     SOFIE LEWIS PT   Date: 2023    ______________________________________ Date: 2023  Physician Signature  By signing above or cosigning electronically, I have reviewed this Plan of Care and certify a need for medically necessary rehabilitation services.
none

## 2023-01-26 NOTE — THERAPY EVALUATION
Phone: [phone]         Fax: [phone]                      Outpatient Physical Therapy                                                                      Evaluation    Date: 2023  Patient: Shaila Glynn  : 1960  ACCT #: [de-identified]    Referring Provider : Dr. Conner Caro    Diagnosis: Right THR    Treatment Diagnosis: Right hip weakness  Onset Date: 23  PT Insurance Information: [employer]    Per Physician Order  Total # of Visits to Date: 1  No Show: 0  Canceled Appointment: 0     Subjective     Additional Pertinent Hx: Underwent elective right THR 23 -anterior approach. discharged home same day. Has LE compression devices post op. Taking prescribed pain medications consistenly;  currently rates pain 1-2/10 at rest which increased to 5/10 with transfers in/out of car, steps. Subjectively notes decreased leg length. Follow up MD appt in 7 weeks/. LEFS = 24. PMHX includes recent left THR,  past B TKR,  HTN  Pain Assessment  Pain Level: 2     IADL History  Active : Yes (currently not driving following recent surgery)  Occupation: Retired  Leisure & Hobbies: Normally active with daily household tasks    Objective  Vision  Vision: Within Functional Limits  Hearing  Hearing: Within functional limits        Strength RLE  Strength RLE: Exception  R Hip Flexion: 3/5  R Hip ABduction: 3-/5  R Knee Flexion: 4/5  R Knee Extension: 3+/5  AROM RLE (degrees)  RLE General AROM: Active flexion to 90 deg when sitting; Active assisted hip IR/ER not tested following recent surgery. Right knee flex/ext WFL       AROM RLE (degrees)  RLE General AROM: Active flexion to 90 deg when sitting; Active assisted hip IR/ER not tested following recent surgery.   Right knee flex/ext WFL         Ambulating independently with wh walker                          Balance  Sitting - Static: Good  Sitting - Dynamic: Good  Standing - Static: Good    Assessment  Assessment: Patient underwent elective right THR(anterior approach) 1/24/23;  currently he is ambulating with wh walker. Patient was educated on home exercise program and activity level along with ice/elevation.   Plan to progress with strengthening and gait activities  Therapy Prognosis: Good    Clinical Presentation:  Evolving  The Following Comorbities will impact the patients progression and Plan of Care:   Previous Orthopedic Injury/Surgery          Medium Complexity    Education: PT POC;   reviewed home exercise and activity level along with ice/elevation        Learning  Does the patient/guardian have any barriers to learning?: No barriers  Will there be a co-learner?: No  What is the preferred language of the patient/guardian?: English  Is an  required?: No    Goals  Short Term Goals  Time Frame for Short Term Goals: 5 visits  Short Term Goal 1: Educate/upgrade home program for right hip/LE strengthening and proper gait pattern    Long Term Goals  Time Frame for Long Term Goals : 12 visits  Long Term Goal 1: Ambulate in home without device;  cane in community as needed  Long Term Goal 2: Right hip abduction strength 4+/5 to ambulate without deviation due to hip weakness  Long Term Goal 3: Right knee extension strength 4+/5 to complete reciprical stair amb  Long Term Goal 4: Subjective improvement in functional mobility with LEFS > 50/80    Patient's Goal:   Get back to walking normal    Timed Code Treatment Minutes: 0 Minutes   Total Time:  45 mintures  Time In: 1010  Time Out: 120 [address], PT Date: 1/26/2023

## 2023-01-31 ENCOUNTER — HOSPITAL ENCOUNTER (OUTPATIENT)
Dept: PHYSICAL THERAPY | Age: 63
Setting detail: THERAPIES SERIES
Discharge: HOME | End: 2023-01-31
Payer: COMMERCIAL

## 2023-01-31 PROCEDURE — 97110 THERAPEUTIC EXERCISES: CPT

## 2023-01-31 ASSESSMENT — PAIN SCALES - GENERAL: PAINLEVEL_OUTOF10: 2

## 2023-01-31 NOTE — PROGRESS NOTES
Phone: Leela Christine Lary      Fax: [phone]                            Outpatient Physical Therapy                                                                            Daily Note    Date: 2023  Patient Name: Rolanda Alvarado        MRN: [id number]   ACCT#:  [de-identified]  : 1960  (63 y.o.)    Referring Provider (secondary): Dr. Ayanna Rogel         Diagnosis: Right THR  Treatment Diagnosis: Right hip weakness    Onset Date: 23  PT Insurance Information: [facility]     Per Physician Order  Total # of Visits to Date: 2  No Show: 0  Canceled Appointment: 0  Plan of Care/Certification Expiration Date: 23    Pre-Treatment Pain:  2/10     Assessment  Assessment: Pt with good ji to ther ex this date. Pt amb with SC with minimal deviation, slight decreased step length but no lateral lean noted. Pt with no increased pain post exercise, just muscle fatigue will monitor ji.     Plan  Continue with current plan of care    Exercises/Modalities/Manual:  See DocFlow Sheet    Education: Ice and meds if needed post session          Goals  (Total # of Visits to Date: 2)   Short Term Goals  Time Frame for Short Term Goals: 5 visits  Short Term Goal 1: Educate/upgrade home program for right hip/LE strengthening and proper gait pattern    Long Term Goals  Time Frame for Long Term Goals : 12 visits  Long Term Goal 1: Ambulate in home without device;  cane in community as needed  Long Term Goal 2: Right hip abduction strength 4+/5 to ambulate without deviation due to hip weakness  Long Term Goal 3: Right knee extension strength 4+/5 to complete reciprical stair amb  Long Term Goal 4: Subjective improvement in functional mobility with LEFS > 50/80    Post Treatment Pain:  2/10    Time In: 0850  Time Out: 0921  Timed Code Treatment Minutes: 31 Minutes  Total Treatment Time: [address] [facility], PT     Date: 2023

## 2023-02-02 ENCOUNTER — HOSPITAL ENCOUNTER (OUTPATIENT)
Dept: PHYSICAL THERAPY | Age: 63
Setting detail: THERAPIES SERIES
Discharge: HOME | End: 2023-02-02
Payer: COMMERCIAL

## 2023-02-02 PROCEDURE — 97110 THERAPEUTIC EXERCISES: CPT

## 2023-02-02 ASSESSMENT — PAIN SCALES - GENERAL: PAINLEVEL_OUTOF10: 2

## 2023-02-02 NOTE — PROGRESS NOTES
Phone: Leela Barth           Fax: [phone]                            Outpatient Physical Therapy                                                                            Daily Note    Date: 2023  Patient Name: Thien Alex        MRN: [id number]   ACCT#:  [de-identified]  : 1960  (63 y.o.)    Referring Provider (secondary): Dr. Ann Jefferson         Diagnosis: Right THR  Treatment Diagnosis: Right hip weakness    Onset Date: 23  PT Insurance Information: Debora Rivera    Per Physician Order  Total # of Visits to Date: 3  No Show: 0  Canceled Appointment: 0  Plan of Care/Certification Expiration Date: 23    Pre-Treatment Pain:  2/10     Assessment  Assessment: Pt rates his pain a 2/10 today experiencing most of his pain when getting into and out of the car. Pt reports only taking his pain medication before therapy. Pt ambulates into therapy with standard cane and has compression pumps on both legs. Incorporated tband walking to improve gait devations. Pt completes exercises as listed with good overall tolerance. Pt reports being \"a little extra sore\" after last session about 1-2 hours after leaving. Pt rates his pain a 1-2/10 leaving this session.     Plan  Continue with current plan of care    Exercises/Modalities/Manual:  See DocFlow Sheet        Goals  (Total # of Visits to Date: 3)   Short Term Goals -   Short Term Goals  Time Frame for Short Term Goals: 5 visits  Short Term Goal 1: Educate/upgrade home program for right hip/LE strengthening and proper gait pattern - met    Long Term Goals -   Long Term Goals  Time Frame for Long Term Goals : 12 visits  Long Term Goal 1: Ambulate in home without device;  cane in community as needed  Long Term Goal 2: Right hip abduction strength 4+/5 to ambulate without deviation due to hip weakness  Long Term Goal 3: Right knee extension strength 4+/5 to complete reciprical stair amb  Long Term Goal 4: Subjective improvement in functional mobility with LEFS > 50/80    Post Treatment Pain:  1-2/10    Time In: 1245    Time Out : 1315        Timed Code Treatment Minutes: 30 Minutes  Total Treatment Time: 30 Minutes    Skylerriam JEREMY Bone/Directly Supervised by Lucas Chilel PTA     Date: 2/2/2023

## 2023-02-06 ENCOUNTER — HOSPITAL ENCOUNTER (OUTPATIENT)
Dept: PHYSICAL THERAPY | Age: 63
Setting detail: THERAPIES SERIES
Discharge: HOME | End: 2023-02-06
Payer: COMMERCIAL

## 2023-02-06 PROCEDURE — 97110 THERAPEUTIC EXERCISES: CPT

## 2023-02-06 NOTE — PROGRESS NOTES
Phone: [address]           Fax: [phone]                            Outpatient Physical Therapy                                                                            Daily Note    Date: 2023  Patient Name: Emani Starr        MRN: [id number]   ACCT#:  [de-identified]  : 1960  (63 y.o.)    Referring Provider (secondary): Dr. Taina Gerber         Diagnosis: Right THR  Treatment Diagnosis: Right hip weakness    Onset Date: 23  PT Insurance Information: Abena Loo    Per Physician Order  Total # of Visits to Date: 4  No Show: 0  Canceled Appointment: 0  Plan of Care/Certification Expiration Date: 23    Pre-Treatment Pain:  0/10     Assessment  Assessment: Pt arrives to therapy today and denies having any pain. Pt noticed less swelling in his R knee but still has significant painful bruising. Pt reports he was able to walk reciprocally on the stairs yesterday with \"a little bark. \" Pt is scheduled to remove is compression cuffs tomorrow at 9 am. Incorporated hurdles, ball rolls, step ups, and added yellow band to side stepping to improve muscle strength and edurance, balance, and to improve gait deviations. Also increased shuttle S/L to 6c to improve muscle strength. Pt reports minimal pain in hip completing exercises, but notes 2/10 pain in R knee performing step ups saying that is the highest his pain was the entire treatment. Plan to continue per POC to improve strength and reduce pain. Pt does not use AD during session, and reports not using a device unless using the stairs. May increase B shuttle to 7c next 1-2 sessions. May incorporate standing hip ADB amd extension to improve pt's hip strength next 1-2 sessions. May also try SLS to challenge balance next 1-2 sessions.     Plan  Continue with current plan of care    Exercises/Modalities/Manual:  See DocFlow Sheet      Goals  (Total # of Visits to Date: 4)   Short Term Goals -   Short Term Goals  Time Frame for Short Term Goals: 5 visits  Short Term Goal 1: Educate/upgrade home program for right hip/LE strengthening and proper gait pattern - met    Long Term Goals -   Long Term Goals  Time Frame for Long Term Goals : 12 visits  Long Term Goal 1: Ambulate in home without device;  cane in community as needed  Long Term Goal 2: Right hip abduction strength 4+/5 to ambulate without deviation due to hip weakness  Long Term Goal 3: Right knee extension strength 4+/5 to complete reciprical stair amb  Long Term Goal 4: Subjective improvement in functional mobility with LEFS > 50/80    Post Treatment Pain:  2/10    Time In: 0816    Time Out : 0846        Timed Code Treatment Minutes: 30 Minutes  Total Treatment Time: 30 Minutes    JEREMY Diamond/Directly Supervised by Mona De Los Santos PTA     Date: 2/6/2023

## 2023-02-09 ENCOUNTER — HOSPITAL ENCOUNTER (OUTPATIENT)
Dept: PHYSICAL THERAPY | Age: 63
Setting detail: THERAPIES SERIES
Discharge: HOME | End: 2023-02-09
Payer: COMMERCIAL

## 2023-02-09 PROCEDURE — 97110 THERAPEUTIC EXERCISES: CPT

## 2023-02-09 NOTE — PROGRESS NOTES
Phone: Leela Barth           Fax: [phone]                            Outpatient Physical Therapy                                                                            Daily Note    Date: 2023  Patient Name: Lucy Drummond        MRN: [id number]   ACCT#:  [de-identified]  : 1960  (63 y.o.)    Referring Provider (secondary): Dr. Victoria Carmichael         Diagnosis: Right THR  Treatment Diagnosis: Right hip weakness    Onset Date: 23  PT Insurance Information: X[employer] Orozco    Per Physician Order  Total # of Visits to Date: 5  No Show: 0  Canceled Appointment: 0  Plan of Care/Certification Expiration Date: 23    Pre-Treatment Pain:  0/10     Assessment  Assessment: Pt denies having any pain arriving to therapy today. Pt reports being able to walk reciprocally up and down stairs, resumed driving, and only uses the cane as a precaution. Incorporated hip hiking(attempted unable to perform without significant compensation), lateral step downs since pt was unable to perform hip hiking correctly, and green Tband walking to improve muscle strength to decrease gait deviations. Increased to orange tband for side stepping to improve muscle strength. Pt reports 2/10 pain for soreness in his hip and 3/10 pain in knee/thigh. Plan to continue per POC to improve pt's gait deviations, reduce pain, and increase muscle strength.     Plan  Continue with current plan of care    Exercises/Modalities/Manual:  See DocFlow Sheet        Goals  (Total # of Visits to Date: 5)   Short Term Goals -   Short Term Goals  Time Frame for Short Term Goals: 5 visits  Short Term Goal 1: Educate/upgrade home program for right hip/LE strengthening and proper gait pattern - met    Long Term Goals -   Long Term Goals  Time Frame for Long Term Goals : 12 visits  Long Term Goal 1: Ambulate in home without device;  cane in community as needed - met  Long Term Goal 2: Right hip abduction strength 4+/5 to ambulate without deviation due to hip weakness  Long Term Goal 3: Right knee extension strength 4+/5 to complete reciprical stair amb  Long Term Goal 4: Subjective improvement in functional mobility with LEFS > 50/80    Post Treatment Pain:  2/10    Time In: 0816    Time Out : 0848        Timed Code Treatment Minutes: 32 Minutes  Total Treatment Time: 32 Minutes    Tova Merlin, SPTA/Directly Supervised by Hernandez Hook, PTA     Date: 2/9/2023

## 2023-02-13 ENCOUNTER — HOSPITAL ENCOUNTER (OUTPATIENT)
Dept: PHYSICAL THERAPY | Age: 63
Setting detail: THERAPIES SERIES
Discharge: HOME | End: 2023-02-13
Payer: COMMERCIAL

## 2023-02-13 PROCEDURE — 97110 THERAPEUTIC EXERCISES: CPT

## 2023-02-13 ASSESSMENT — PAIN SCALES - GENERAL: PAINLEVEL_OUTOF10: 1

## 2023-02-13 NOTE — PROGRESS NOTES
Phone: Leela Barth      Fax: [phone]                            Outpatient Physical Therapy                                                                            Daily Note    Date: 2023  Patient Name: Uzma Verma        MRN: [id number]   ACCT#:  [de-identified]  : 1960  (63 y.o.)    Referring Provider (secondary): Dr. Armin Brown         Diagnosis: Right THR  Treatment Diagnosis: Right hip weakness    Onset Date: 23  PT Insurance Information: Bryan Beltran    Per Physician Order  Total # of Visits to Date: 6  No Show: 0  Canceled Appointment: 0  Plan of Care/Certification Expiration Date: 23    Pre-Treatment Pain:  1/10     Assessment  Assessment: Ambulated to department without device;  states he goes primarily throughout day without device but uses cane when pain or fatigue increases. Most difficulty with ascending steps. Completed right lateral step downs with fatigue and weakness. Issued green tband for home sidestepping and tband walking. Plan to continue x 2 visits and then tentatively discharge with HEP to save insurance days.     Plan  Continue with current plan of care    Exercises/Modalities/Manual:  See DocFlow Sheet    Education: Issued green tband for sidestepping and tband walking          Goals  (Total # of Visits to Date: 6)   Short Term Goals  Time Frame for Short Term Goals: 5 visits  Short Term Goal 1: Educate/upgrade home program for right hip/LE strengthening and proper gait pattern - met    Long Term Goals  Time Frame for Long Term Goals : 12 visits  Long Term Goal 1: Ambulate in home without device;  cane in community as needed - met  Long Term Goal 2: Right hip abduction strength 4+/5 to ambulate without deviation due to hip weakness  Long Term Goal 3: Right knee extension strength 4+/5 to complete reciprical stair amb  Long Term Goal 4: Subjective improvement in functional mobility with LEFS > 50/80    Post Treatment Pain: 1/10    Time In: 0815    Time Out : 5868        Timed Code Treatment Minutes: 35 Minutes  Total Treatment Time: 36 Minutes    SOFIE LEWIS, PT     Date: 2/13/2023

## 2023-02-16 ENCOUNTER — HOSPITAL ENCOUNTER (OUTPATIENT)
Dept: PHYSICAL THERAPY | Age: 63
Setting detail: THERAPIES SERIES
Discharge: HOME | End: 2023-02-16
Payer: COMMERCIAL

## 2023-02-16 PROCEDURE — 97110 THERAPEUTIC EXERCISES: CPT

## 2023-02-16 NOTE — PROGRESS NOTES
Phone: [address] [facility]           Fax: [phone]                            Outpatient Physical Therapy                                                                            Daily Note    Date: 2023  Patient Name: Lindsay Alicea        MRN: [id number]   ACCT#:  [de-identified]  : 1960  (63 y.o.)    Referring Provider (secondary): Dr. George Barber         Diagnosis: Right THR  Treatment Diagnosis: Right hip weakness    Onset Date: 23  PT Insurance Information: Jose Cruz Goode    Per Physician Order  Total # of Visits to Date: 7  No Show: 0  Canceled Appointment: 0  Plan of Care/Certification Expiration Date: 23    Pre-Treatment Pain:  0/10     Assessment  Assessment: Pt denies having any pain when arriving to treatment, but says his pain was about a 5-6/10 the night after last session. Pt thinks it was caused by the lateral step downs. Completed exercises as listed with good overall tolerance. Attempted hip hiking, but pt was unable to perform d/t weakness. Plan to complete session as listed next visit, and possibly discharge. Pt reports 1.5/10 pain when leaving session.     Plan  Continue with current plan of care    Exercises/Modalities/Manual:  See DocFlow Sheet      Goals  (Total # of Visits to Date: 7)   Short Term Goals -   Short Term Goals  Time Frame for Short Term Goals: 5 visits  Short Term Goal 1: Educate/upgrade home program for right hip/LE strengthening and proper gait pattern - met    Long Term Goals -   Long Term Goals  Time Frame for Long Term Goals : 12 visits  Long Term Goal 1: Ambulate in home without device;  cane in community as needed - met  Long Term Goal 2: Right hip abduction strength 4+/5 to ambulate without deviation due to hip weakness  Long Term Goal 3: Right knee extension strength 4+/5 to complete reciprical stair amb  Long Term Goal 4: Subjective improvement in functional mobility with LEFS > 50/80    Post Treatment Pain: 1.5/10    Time In: 4570    Time Out : 0845        Timed Code Treatment Minutes: 34 Minutes  Total Treatment Time: 34 Minutes    JEREMY Currie/Directly Supervised by Ulices Bentley, MOISES     Date: 2/16/2023

## 2023-02-23 ENCOUNTER — HOSPITAL ENCOUNTER (OUTPATIENT)
Dept: PHYSICAL THERAPY | Age: 63
Setting detail: THERAPIES SERIES
Discharge: HOME | End: 2023-02-23
Payer: COMMERCIAL

## 2023-02-23 PROCEDURE — 97110 THERAPEUTIC EXERCISES: CPT

## 2023-02-23 NOTE — PROGRESS NOTES
Phone: [address]      Fax: [phone]                            Outpatient Physical Therapy                                                                            Daily Note    Date: 2023  Patient Name: Thien Alex        MRN: [id number]   ACCT#:  [de-identified]  : 1960  (63 y.o.)    Referring Provider (secondary): Dr. Ann Jefferson         Diagnosis: Right THR  Treatment Diagnosis: Right hip weakness    Onset Date: 23  PT Insurance Information: [employer]  Total # of Visits Approved: 12 Per Physician Order  Total # of Visits to Date: 8  No Show: 0  Canceled Appointment: 0  Plan of Care/Certification Expiration Date: 23    Pre-Treatment Pain:  0/10     Assessment  Assessment: Pt 5 min late for appt. Pt reports he has been having intermittent medial knee pain which is sharp and pinpoint , primarily when twisting on left LE. Pain in knee has been infrequent yet sharp. Advised to ice knee and to monitor , he see's Dr for followup in 3 weeks and will discuss it with him if still occurring. Right hip strength abduction 5/5, knee ext R 4+/5 with pt able to perform steps reciprically. Pt reports primary limitations  are primarily from his knees. LEFS  60/80. Pt as no concerns at this time. DC at this time to HEP.     Plan  DC    Exercises/Modalities/Manual:  See DocFlow Sheet              Goals  (Total # of Visits to Date: 8)   Short Term Goals  Time Frame for Short Term Goals: 5 visits  Short Term Goal 1: Educate/upgrade home program for right hip/LE strengthening and proper gait pattern - met    Long Term Goals  Time Frame for Long Term Goals : 12 visits  Long Term Goal 1: Ambulate in home without device;  cane in community as needed - met  Long Term Goal 2: Right hip abduction strength 4+/5 to ambulate without deviation due to hip weakness-met  Long Term Goal 3: Right knee extension strength 4+/5 to complete reciprical stair amb-met  Long Term Goal 4: Subjective improvement in functional mobility with LEFS > 50/80-met    Post Treatment Pain:  0/10    Time In: 0820    Time Out : 4520        Timed Code Treatment Minutes: 35 Minutes  Total Treatment Time: 35 Minutes    Yulisa Costa PTA    Date: 2/23/2023

## 2023-03-13 ENCOUNTER — OFFICE VISIT (OUTPATIENT)
Dept: FAMILY MEDICINE CLINIC | Age: 63
End: 2023-03-13
Payer: COMMERCIAL

## 2023-03-13 VITALS
DIASTOLIC BLOOD PRESSURE: 96 MMHG | BODY MASS INDEX: 36.08 KG/M2 | SYSTOLIC BLOOD PRESSURE: 168 MMHG | HEIGHT: 70 IN | HEART RATE: 90 BPM | OXYGEN SATURATION: 98 % | WEIGHT: 252 LBS

## 2023-03-13 DIAGNOSIS — I10 ESSENTIAL HYPERTENSION: Primary | ICD-10-CM

## 2023-03-13 DIAGNOSIS — J01.00 ACUTE NON-RECURRENT MAXILLARY SINUSITIS: ICD-10-CM

## 2023-03-13 DIAGNOSIS — L57.0 AK (ACTINIC KERATOSIS): ICD-10-CM

## 2023-03-13 PROCEDURE — 3078F DIAST BP <80 MM HG: CPT | Performed by: FAMILY MEDICINE

## 2023-03-13 PROCEDURE — 17000 DESTRUCT PREMALG LESION: CPT | Performed by: FAMILY MEDICINE

## 2023-03-13 PROCEDURE — 99214 OFFICE O/P EST MOD 30 MIN: CPT | Performed by: FAMILY MEDICINE

## 2023-03-13 PROCEDURE — 3074F SYST BP LT 130 MM HG: CPT | Performed by: FAMILY MEDICINE

## 2023-03-13 RX ORDER — METOPROLOL SUCCINATE 50 MG/1
50 TABLET, EXTENDED RELEASE ORAL DAILY
Qty: 90 TABLET | Refills: 1 | Status: SHIPPED | OUTPATIENT
Start: 2023-03-13

## 2023-03-13 RX ORDER — AZITHROMYCIN 250 MG/1
TABLET, FILM COATED ORAL
Qty: 6 TABLET | Refills: 0 | Status: SHIPPED | OUTPATIENT
Start: 2023-03-13 | End: 2023-03-16

## 2023-03-13 RX ORDER — HYDROCHLOROTHIAZIDE 25 MG/1
25 TABLET ORAL DAILY
Qty: 90 TABLET | Refills: 1 | Status: SHIPPED | OUTPATIENT
Start: 2023-03-13

## 2023-03-13 RX ORDER — LOSARTAN POTASSIUM 100 MG/1
100 TABLET ORAL DAILY
Qty: 90 TABLET | Refills: 1 | Status: SHIPPED | OUTPATIENT
Start: 2023-03-13

## 2023-03-13 SDOH — ECONOMIC STABILITY: FOOD INSECURITY: WITHIN THE PAST 12 MONTHS, YOU WORRIED THAT YOUR FOOD WOULD RUN OUT BEFORE YOU GOT MONEY TO BUY MORE.: NEVER TRUE

## 2023-03-13 SDOH — ECONOMIC STABILITY: INCOME INSECURITY: HOW HARD IS IT FOR YOU TO PAY FOR THE VERY BASICS LIKE FOOD, HOUSING, MEDICAL CARE, AND HEATING?: NOT HARD AT ALL

## 2023-03-13 SDOH — ECONOMIC STABILITY: FOOD INSECURITY: WITHIN THE PAST 12 MONTHS, THE FOOD YOU BOUGHT JUST DIDN'T LAST AND YOU DIDN'T HAVE MONEY TO GET MORE.: NEVER TRUE

## 2023-03-13 SDOH — ECONOMIC STABILITY: HOUSING INSECURITY
IN THE LAST 12 MONTHS, WAS THERE A TIME WHEN YOU DID NOT HAVE A STEADY PLACE TO SLEEP OR SLEPT IN A SHELTER (INCLUDING NOW)?: NO

## 2023-03-13 ASSESSMENT — PATIENT HEALTH QUESTIONNAIRE - PHQ9
1. LITTLE INTEREST OR PLEASURE IN DOING THINGS: 0
2. FEELING DOWN, DEPRESSED OR HOPELESS: 0
SUM OF ALL RESPONSES TO PHQ QUESTIONS 1-9: 0
SUM OF ALL RESPONSES TO PHQ QUESTIONS 1-9: 0
SUM OF ALL RESPONSES TO PHQ9 QUESTIONS 1 & 2: 0
SUM OF ALL RESPONSES TO PHQ QUESTIONS 1-9: 0
SUM OF ALL RESPONSES TO PHQ QUESTIONS 1-9: 0

## 2023-03-13 NOTE — PROGRESS NOTES
Name: John Sanchez  : 1960         Chief Complaint:     Chief Complaint   Patient presents with    Hypertension    Hyperlipidemia    Gastroesophageal Reflux       History of Present Illness:      John Sanchez is a 63 y.o.  male who presents with Hypertension, Hyperlipidemia, and Gastroesophageal Reflux      HPI    Persistent feeling of catching in a particular spot in L anterior knee since shortly after replacement of that knee 3 yrs ago. Sees ortho and was told he may need imaging, patella replacement. Did great from R CAIO, was able to get rid of cane pretty early and is walking without difficulty. Sick for past few wks, discolored sputum, no fever this time (as he had had end of Dec). Persistent cough still. A lot of trouble seems to be centered around R submandibular lymph node, seemed to start there and had pain in R ear and throat, then went to L side but not as bad. Then went back into R side. HTN - delayed initiation of metoprolol d/t fear of side effects but then 3 days ago started having pressure again in R neck, checked BP and it was very high so he started the toprol. Thinks much of uncontrolled BP d/t weight gain, decreased activity, poor diet and excessive sodium intake. Skin lesion R face tx with cryo 22 got a little bit of a scab on it, never got black. Persists but does lighten in color sometimes and seems smoother or less raised. Medical History:     Patient Active Problem List   Diagnosis    HTN (hypertension)    GERD (gastroesophageal reflux disease)    Seasonal allergies    Pure hypercholesterolemia    Arthritis    Environmental and seasonal allergies       Medications:       Prior to Admission medications    Medication Sig Start Date End Date Taking?  Authorizing Provider   hydroCHLOROthiazide (HYDRODIURIL) 25 MG tablet Take 1 tablet by mouth daily 3/13/23  Yes Tj Barker DO   losartan (COZAAR) 100 MG tablet Take 1 tablet by mouth daily 3/13/23  Yes Apolinar Mott ARLEN Yoder,    metoprolol succinate (TOPROL XL) 50 MG extended release tablet Take 1 tablet by mouth daily 3/13/23  Yes Facundo Harding DO   azithromycin (ZITHROMAX) 250 MG tablet 500mg on day 1 followed by 250mg on days 2 - 5 3/13/23  Yes Facundo Harding DO   traMADol (ULTRAM) 50 MG tablet TAKE 1-2 TABLETS BY MOUTH EVERY 6 HOURS AS NEEDED FOR PAIN 11/15/22  Yes Historical Provider, MD   celecoxib (CELEBREX) 200 MG capsule TAKE 1 CAPSULE BY MOUTH EVERY DAY 12/17/22  Yes Historical Provider, MD   aspirin EC 81 MG EC tablet Take 1 tablet by mouth daily 9/1/21  Yes Facundo Harding DO   zinc gluconate 50 MG tablet Take 100 mg by mouth daily   Yes Historical Provider, MD   vitamin D (CHOLECALCIFEROL) 25 MCG (1000 UT) TABS tablet Take 1,000 Units by mouth daily   Yes Historical Provider, MD   ascorbic acid (VITAMIN C) 1000 MG tablet Take 1,000 mg by mouth daily   Yes Historical Provider, MD   GARLIC PO Take by mouth   Yes Historical Provider, MD   Loratadine (CLARITIN PO) Take by mouth   Yes Historical Provider, MD   fluticasone [facility] ALLERGY RELIEF) 50 MCG/ACT nasal spray 1 spray by Nasal route daily 3/9/18  Yes Facundo Harding DO        Allergies:       Pneumococcal vaccines, Statins, and Tetracyclines & related    Physical Exam:     Vitals:  BP (!) 168/96   Pulse 90   Ht 5' 10\" (1.778 m)   Wt 252 lb (114.3 kg)   SpO2 98%   BMI 36.16 kg/m²   Physical Exam  Vitals and nursing note reviewed. Constitutional:       General: He is not in acute distress. Appearance: He is well-developed. HENT:      Right Ear: Tympanic membrane and ear canal normal.      Left Ear: Tympanic membrane and ear canal normal.      Nose: Nose normal.      Mouth/Throat:      Mouth: Mucous membranes are moist.      Pharynx: Oropharynx is clear. Eyes:      Conjunctiva/sclera: Conjunctivae normal.   Cardiovascular:      Rate and Rhythm: Normal rate and regular rhythm. Heart sounds: Normal heart sounds.    Pulmonary: Effort: Pulmonary effort is normal.      Breath sounds: Normal breath sounds. Musculoskeletal:      Cervical back: Neck supple. Lymphadenopathy:      Cervical: Cervical adenopathy (mild submandibular) present. Skin:     General: Skin is warm and dry. Comments: R lateral cheekbone: pink macule approx 8x6mm   Neurological:      Mental Status: He is alert and oriented to person, place, and time. Psychiatric:         Mood and Affect: Mood normal.         Behavior: Behavior normal.       Data:     Lab Results   Component Value Date/Time     03/29/2021 12:21 PM    K 4.1 03/29/2021 12:21 PM     03/29/2021 12:21 PM    CO2 24 03/29/2021 12:21 PM    BUN 13 03/29/2021 12:21 PM    CREATININE 0.82 03/29/2021 12:21 PM    GLUCOSE 108 03/29/2021 12:21 PM    PROT 7.1 03/29/2021 12:21 PM    LABALBU 4.1 03/29/2021 12:21 PM    BILITOT 0.63 03/29/2021 12:21 PM    ALKPHOS 86 03/29/2021 12:21 PM    AST 13 03/29/2021 12:21 PM    ALT 33 03/29/2021 12:21 PM     Lab Results   Component Value Date/Time    WBC 9.5 03/29/2021 12:21 PM    RBC 5.57 03/29/2021 12:21 PM    HGB 16.5 03/29/2021 12:21 PM    HCT 48.6 03/29/2021 12:21 PM    MCV 87.4 03/29/2021 12:21 PM    MCH 29.6 03/29/2021 12:21 PM    MCHC 33.9 03/29/2021 12:21 PM    RDW 13.2 03/29/2021 12:21 PM     03/29/2021 12:21 PM    MPV NOT REPORTED 03/29/2021 12:21 PM     Lab Results   Component Value Date/Time    TSH 1.65 08/18/2017 12:00 AM     Lab Results   Component Value Date/Time    CHOL 198 09/11/2020 07:19 AM    CHOL 229 08/18/2017 12:00 AM    HDL 39 09/11/2020 07:19 AM    PSA 2.32 09/11/2020 07:19 AM    LABA1C 5.8 08/31/2018 07:22 AM         Assessment & Plan:        Diagnosis Orders   1. Essential hypertension        2. AK (actinic keratosis)  [address]4601 - [place] DESTRUC PREMALIGNANT, FIRST LESION      3. Acute non-recurrent maxillary sinusitis          HTN uncontrolled - inc toprol dose to 50 mg daily. Cont losartan 100 and hctz 25. Monitor at home.   AK tx with cryo for 3 freeze-thaw cycles with liquid nitrogen on polyester swab, tolerated well. Atb, cont otc meds prn also.       Requested Prescriptions     Signed Prescriptions Disp Refills    hydroCHLOROthiazide (HYDRODIURIL) 25 MG tablet 90 tablet 1     Sig: Take 1 tablet by mouth daily    losartan (COZAAR) 100 MG tablet 90 tablet 1     Sig: Take 1 tablet by mouth daily    metoprolol succinate (TOPROL XL) 50 MG extended release tablet 90 tablet 1     Sig: Take 1 tablet by mouth daily    azithromycin (ZITHROMAX) 250 MG tablet 6 tablet 0     Simg on day 1 followed by 250mg on days 2 - 5         There are no Patient Instructions on file for this visit.      signed by Franck Abarca DO on 3/14/2023 at 11:13 PM  [address]  Dept: [phone]

## 2023-03-16 ENCOUNTER — TELEPHONE (OUTPATIENT)
Dept: FAMILY MEDICINE CLINIC | Age: 63
End: 2023-03-16

## 2023-03-16 RX ORDER — AMOXICILLIN AND CLAVULANATE POTASSIUM 875; 125 MG/1; MG/1
1 TABLET, FILM COATED ORAL 2 TIMES DAILY
Qty: 20 TABLET | Refills: 0 | Status: SHIPPED | OUTPATIENT
Start: 2023-03-16 | End: 2023-03-26

## 2023-03-16 NOTE — TELEPHONE ENCOUNTER
Miguel Reynoso was in the office on 3/13 and prescribed a zpack. He said his symptoms got a little worse in the evening. He started running a fever of 100.6-101.4 and has a really bad sore throat. He said this is day 3 of the fever and sore throat and 3rd day in of the zpack. His home covid test was negative but he does not feel the zpack is working. Can something else be called in for him? Please let Miguel Reynoso know. DM-maximilian    Health Maintenance   Topic Date Due    COVID-19 Vaccine (1) Never done    HIV screen  Never done    Hepatitis C screen  Never done    DTaP/Tdap/Td vaccine (1 - Tdap) Never done    Shingles vaccine (1 of 2) Never done    A1C test (Diabetic or Prediabetic)  08/31/2019    Flu vaccine (1) 08/01/2022    Depression Screen  03/13/2024    Lipids  09/11/2025    Colorectal Cancer Screen  07/11/2026    Hepatitis A vaccine  Aged Out    Hib vaccine  Aged Out    Meningococcal (ACWY) vaccine  Aged Out             (applicable per patient's age: Cancer Screenings, Depression Screening, Fall Risk Screening, Immunizations)    Hemoglobin A1C (%)   Date Value   08/31/2018 5.8   08/18/2017 5.7     LDL Cholesterol (mg/dL)   Date Value   09/11/2020 122     LDL Calculated (mg/dL)   Date Value   08/18/2017 160     AST (U/L)   Date Value   03/29/2021 13     ALT (U/L)   Date Value   03/29/2021 33     BUN (mg/dL)   Date Value   03/29/2021 13      (goal A1C is < 7)   (goal LDL is <100) need 30-50% reduction from baseline     BP Readings from Last 3 Encounters:   03/13/23 (!) 168/96   12/27/22 (!) 160/98   09/09/22 (!) 160/92    (goal /80)      All Future Testing planned in [facility]:  Lab Frequency Next Occurrence   MRI LUMBAR SPINE WO CONTRAST Once 07/05/2022   Lipid Panel Once 09/09/2022   PSA Screening Once 09/09/2022   Hemoglobin A1C Once 09/09/2022       Next Visit Date:  No future appointments.          Patient Active Problem List:     HTN (hypertension)     GERD (gastroesophageal reflux disease)     Seasonal allergies     Pure hypercholesterolemia     Arthritis     Environmental and seasonal allergies

## 2023-03-23 ENCOUNTER — PATIENT MESSAGE (OUTPATIENT)
Dept: FAMILY MEDICINE CLINIC | Age: 63
End: 2023-03-23

## 2023-03-23 ENCOUNTER — HOSPITAL ENCOUNTER (OUTPATIENT)
Age: 63
Discharge: HOME | End: 2023-03-23
Payer: COMMERCIAL

## 2023-03-23 DIAGNOSIS — R59.1 LYMPHADENOPATHY: ICD-10-CM

## 2023-03-23 DIAGNOSIS — R50.9 ACUTE FEBRILE ILLNESS: ICD-10-CM

## 2023-03-23 DIAGNOSIS — R50.9 ACUTE FEBRILE ILLNESS: Primary | ICD-10-CM

## 2023-03-23 DIAGNOSIS — Z96.659 HISTORY OF KNEE JOINT REPLACEMENT: ICD-10-CM

## 2023-03-23 LAB
ABSOLUTE EOS #: 0.1 K/UL (ref 0–0.4)
ABSOLUTE LYMPH #: 1.9 K/UL (ref 1–4.8)
ABSOLUTE MONO #: 0.7 K/UL (ref 0–1)
ALBUMIN SERPL-MCNC: 4.1 G/DL (ref 3.5–5.2)
ALP SERPL-CCNC: 117 U/L (ref 40–129)
ALT SERPL-CCNC: 22 U/L (ref 5–41)
ANION GAP SERPL CALCULATED.3IONS-SCNC: 9 MMOL/L (ref 9–17)
AST SERPL-CCNC: 17 U/L
BASOPHILS # BLD: 1 % (ref 0–2)
BASOPHILS ABSOLUTE: 0.1 K/UL (ref 0–0.2)
BILIRUB SERPL-MCNC: 0.3 MG/DL (ref 0.3–1.2)
BUN SERPL-MCNC: 18 MG/DL (ref 8–23)
BUN/CREAT BLD: 19 (ref 9–20)
CALCIUM SERPL-MCNC: 9.8 MG/DL (ref 8.6–10.4)
CHLORIDE SERPL-SCNC: 105 MMOL/L (ref 98–107)
CO2 SERPL-SCNC: 26 MMOL/L (ref 20–31)
CREAT SERPL-MCNC: 0.96 MG/DL (ref 0.7–1.2)
DIFFERENTIAL TYPE: YES
EOSINOPHILS RELATIVE PERCENT: 2 % (ref 0–5)
ERYTHROCYTE [SEDIMENTATION RATE] IN BLOOD BY WESTERGREN METHOD: 32 MM/HR (ref 0–20)
GFR SERPL CREATININE-BSD FRML MDRD: >60 ML/MIN/1.73M2
GLUCOSE SERPL-MCNC: 101 MG/DL (ref 70–99)
HCT VFR BLD AUTO: 45.4 % (ref 41–53)
HGB BLD-MCNC: 14.8 G/DL (ref 13.5–17.5)
LYMPHOCYTES # BLD: 21 % (ref 13–44)
MCH RBC QN AUTO: 27.8 PG (ref 26–34)
MCHC RBC AUTO-ENTMCNC: 32.5 G/DL (ref 31–37)
MCV RBC AUTO: 85.6 FL (ref 80–100)
MONOCYTES # BLD: 8 % (ref 5–9)
PDW BLD-RTO: 14.3 % (ref 12.1–15.2)
PLATELET # BLD AUTO: 357 K/UL (ref 140–450)
POTASSIUM SERPL-SCNC: 4.2 MMOL/L (ref 3.7–5.3)
PROT SERPL-MCNC: 7.2 G/DL (ref 6.4–8.3)
RBC # BLD: 5.31 M/UL (ref 4.5–5.9)
SEG NEUTROPHILS: 68 % (ref 39–75)
SEGMENTED NEUTROPHILS ABSOLUTE COUNT: 6.1 K/UL (ref 2.1–6.5)
SODIUM SERPL-SCNC: 140 MMOL/L (ref 135–144)
WBC # BLD AUTO: 8.9 K/UL (ref 3.5–11)

## 2023-03-23 PROCEDURE — 85652 RBC SED RATE AUTOMATED: CPT

## 2023-03-23 PROCEDURE — 36415 COLL VENOUS BLD VENIPUNCTURE: CPT

## 2023-03-23 PROCEDURE — 86665 EPSTEIN-BARR CAPSID VCA: CPT

## 2023-03-23 PROCEDURE — 86663 EPSTEIN-BARR ANTIBODY: CPT

## 2023-03-23 PROCEDURE — 86664 EPSTEIN-BARR NUCLEAR ANTIGEN: CPT

## 2023-03-23 PROCEDURE — 85025 COMPLETE CBC W/AUTO DIFF WBC: CPT

## 2023-03-23 PROCEDURE — 80053 COMPREHEN METABOLIC PANEL: CPT

## 2023-03-23 NOTE — TELEPHONE ENCOUNTER
From: Anand Dempsey  To: Dr. Wells Waterproof: 3/23/2023 9:09 AM EDT  Subject: Still Having Issues    Dr. Alyson Bloom,    Please see the attachment here with my BP readings. They are all over the place it seems. I'm still not completely over what ever this virus was, not sure it that has anything to do with it or not. My current symptoms would be the erratic BP readings, head & sinus aches, occasional dry cough, scratchy throat, occasional low grade fever. When I was running the higher fever (see notes on attachment for dates), I also had a VERY sore throat with a few white spots. Any thoughts on this would be appreciated.     Thanks, Dayton Elizabeth  [phone]

## 2023-03-25 LAB
EBV EARLY ANTIGEN AB, IGG: 34.2 U/ML (ref 0–10.9)
EBV NUCLEAR AG AB: 397 U/ML (ref 0–21.9)
EPSTEIN-BARR VCA IGG: 225 U/ML (ref 0–21.9)
EPSTEIN-BARR VCA IGM: 30.8 U/ML (ref 0–43.9)

## 2023-04-17 ENCOUNTER — OFFICE VISIT (OUTPATIENT)
Dept: FAMILY MEDICINE CLINIC | Age: 63
End: 2023-04-17
Payer: COMMERCIAL

## 2023-04-17 VITALS
WEIGHT: 246 LBS | BODY MASS INDEX: 35.22 KG/M2 | HEART RATE: 88 BPM | HEIGHT: 70 IN | OXYGEN SATURATION: 97 % | SYSTOLIC BLOOD PRESSURE: 123 MMHG | DIASTOLIC BLOOD PRESSURE: 76 MMHG

## 2023-04-17 DIAGNOSIS — I10 ESSENTIAL HYPERTENSION: Primary | ICD-10-CM

## 2023-04-17 DIAGNOSIS — M54.2 NECK PAIN: ICD-10-CM

## 2023-04-17 DIAGNOSIS — G25.0 ESSENTIAL TREMOR: ICD-10-CM

## 2023-04-17 DIAGNOSIS — R53.83 FATIGUE, UNSPECIFIED TYPE: ICD-10-CM

## 2023-04-17 DIAGNOSIS — A68.9 RECURRENT FEVER: ICD-10-CM

## 2023-04-17 PROCEDURE — 99214 OFFICE O/P EST MOD 30 MIN: CPT | Performed by: FAMILY MEDICINE

## 2023-04-17 PROCEDURE — 3078F DIAST BP <80 MM HG: CPT | Performed by: FAMILY MEDICINE

## 2023-04-17 PROCEDURE — 3074F SYST BP LT 130 MM HG: CPT | Performed by: FAMILY MEDICINE

## 2023-04-17 RX ORDER — MELOXICAM 15 MG/1
15 TABLET ORAL DAILY
Qty: 90 TABLET | Refills: 1 | Status: SHIPPED | OUTPATIENT
Start: 2023-04-17

## 2023-04-17 NOTE — PROGRESS NOTES
Name: Lizzy Tijerina  : 1960         Chief Complaint:     Chief Complaint   Patient presents with    Hypertension       History of Present Illness:      Lizzy Tijreina is a 66 y.o.  male who presents with Hypertension      HPI      Patient presents for f/u of high blood pressure and low-grade fevers. This persisted for a couple weeks after his last visit but fevers in particular have completely resolved. Feeling pretty well at this point. Still getting tired frequently which stays pretty consistent but otherwise he'll go in cycles of feeling poorly for a couple days at a time, gets pain deep in L ear and goes down lateral neck, between shoulder blades and sometimes into posterior head. May last a couple days and then it goes away. This had started in late Feb after second knee replacement, was similar to illness he had had in December prior to surgery but less severe. Got better in a couple days with OTC meds but then got much worse with terrible sore throat, R submandibular lymph node swelling, headache. Those symptoms would come and go for a few days at a time for a while. C/o tinnitus that can occur on either side but is usually on L. Only notices in quiet room, ie when trying to go to sleep at night. Not necessarily correlated with the pain under the ear. Has been monitoring BP and it's improved a lot. Has noticed tremor actually seems a little worse on higher dose of metoprolol (50 compared to 25). Notices most with bringing together index finger and thumb. Re the neck pain he's seen chiro a couple times and they didn't think the neck looked too bad. They did crack his neck some which didn't result in a noticeable improvement. Couple times when really bad he slept in recliner which helped some. Chronic negro shoulder pain, had been on celebrex for 8 mos (sometimes tylenol also) and then stopped about 2 wks ago and shoulders became immediately very painful and stiff.  Couple times has taken 2

## 2023-04-20 PROBLEM — G25.0 ESSENTIAL TREMOR: Status: ACTIVE | Noted: 2023-04-20

## 2023-04-28 ENCOUNTER — HOSPITAL ENCOUNTER (OUTPATIENT)
Dept: PHYSICAL THERAPY | Age: 63
Setting detail: THERAPIES SERIES
Discharge: HOME | End: 2023-04-28
Payer: COMMERCIAL

## 2023-04-28 PROCEDURE — 97140 MANUAL THERAPY 1/> REGIONS: CPT

## 2023-04-28 PROCEDURE — 97162 PT EVAL MOD COMPLEX 30 MIN: CPT

## 2023-04-28 ASSESSMENT — PAIN DESCRIPTION - ORIENTATION: ORIENTATION: RIGHT;LEFT

## 2023-04-28 ASSESSMENT — PAIN SCALES - GENERAL: PAINLEVEL_OUTOF10: 6

## 2023-04-28 ASSESSMENT — PAIN DESCRIPTION - LOCATION: LOCATION: SHOULDER

## 2023-04-28 NOTE — PROGRESS NOTES
Phone: [address] [place]         Fax: [phone]                      Outpatient Physical Therapy                                                                      Evaluation    Date: 2023  Patient: Hal Acuña  : 1960  ACCT #: [de-identified]    Referring Provider (secondary): Dr. Alma Perez    Diagnosis: Primary Osteoarthritis of left shld and right shld    Treatment Diagnosis: Bilateral shld pain, neck pain  Onset Date: 23 (Referral)  PT Insurance Information: [address] [place] [address]  Total # of Visits Approved: 12 Per Physician Order  Total # of Visits to Date: 1     Subjective     Additional Pertinent Hx: Main c/o pain upper back into both shlds and neck pain,. Patient reports also headaches and dizziness with this when first started but resolved with chiropractor treatment. Hien Marcano Hx- B Knee replacement, 3 yrs ago, B hip replacements in past year, OA multiple joints. Cyrus Severs in shld unchanged. Pain varies 0-6/10 B shld  depending on activity. Sitting 0/10 pain at times. Pain interfers with sleep, pain with reaching overhead or sideways. Saw massage therapist twice with some relief. Meds- celebrex. Retired. Trying to do yard work but pain interferring.   Pain Assessment  Pain Level: 6  Pain Location: Shoulder  Pain Orientation: Right, Left  Social/Functional History  Lives With: Spouse  Type of Home: House  Occupation: Retired       Objective  Spine  Cervical: flexion 65, ext 40, sidebend 25, rotation 60 degrees  Joint Mobility  Spine: tightness T2-T4 area  Strength RUE  Strength RUE: Exception  R Shoulder Flexion: 4-/5  R Shoulder ABduction: 4-/5  R Shoulder Internal Rotation: 4/5  R Shoulder External Rotation: 4/5  R Shoulder Horizontal ABduction: 4/5  Strength LUE  Strength LUE: Exception  L Shoulder Flexion: 4-/5  L Shoulder ABduction: 4-/5  L Shoulder Internal Rotation: 4/5  L Shoulder External Rotation: 4/5  L Shoulder Horizontal ABduction:

## 2023-04-28 NOTE — PLAN OF CARE
[facility]       Phone: [phone]   Date: 2023                      Outpatient Physical Therapy  Fax: [phone]    ACCT #: [de-identified]                     Plan of Care  [facility]#: [id number]  Patient: Josafat Samson  : 1960    Referring Provider (secondary): Dr. Oralia Garcia    Diagnosis: Primary Osteoarthritis of left shld and right shld  Onset Date: 23 (Referral)  Treatment Diagnosis: Bilateral shld pain, neck pain    Assessment  Body Structures, Functions, Activity Limitations Requiring Skilled Therapeutic Intervention: Decreased ADL status, Decreased ROM, Decreased strength, Increased pain  Assessment: Patient presents with flare up of B shld and neck pain, with hx of OA and decrease ROM. Completed manual therapy per Doc Flow. Plan for therex, HEP, manual therapy. Therapy Prognosis: Good    Treatment Plan   Days: 2 times per week Weeks: 6 weeks Total # of Visits Approved: 12    Patient Education/HEP, Therapeutic Exercise, and Manual Therapy: Mobilization/Manipulation     Goals  Short Term Goals  Time Frame for Short Term Goals: 8  Short Term Goal 1: Pt will increase strength  B/L shoulder flex from 4-/5 to 5/5 to improve ji. of ADLs and yard work  Short Term Goal 2: Pt will increase B/L shoulder ABD ROM  to 110deg to improve ji. to ADLs and yard work  Short Term Goal 3: Pt will increase L shoulder ER ROM from 31deg to 50 deg to improve ji. to ADLs  Long Term Goals  Time Frame for Long Term Goals : 12  Long Term Goal 1: Pt will decrease pain from a 6/10 to a 4/10 to improve ability to perform yard work. Long Term Goal 2: Pt will increase UEFS from 60/80 to >65/80 to improve ability to perform ADLs     Tej Kaiser, PT   Date: 2023    ______________________________________ Date: 2023  Physician Signature  By signing above or cosigning electronically, I have reviewed this Plan of Care and certify a need for medically necessary rehabilitation services.

## 2023-05-02 ENCOUNTER — HOSPITAL ENCOUNTER (OUTPATIENT)
Dept: PHYSICAL THERAPY | Age: 63
Setting detail: THERAPIES SERIES
Discharge: HOME | End: 2023-05-02
Payer: COMMERCIAL

## 2023-05-02 PROCEDURE — 97140 MANUAL THERAPY 1/> REGIONS: CPT

## 2023-05-02 PROCEDURE — 97110 THERAPEUTIC EXERCISES: CPT

## 2023-05-02 NOTE — PROGRESS NOTES
Phone: [address]      Fax: [phone]                            Outpatient Physical Therapy                                                                            Daily Note    Date: 2023  Patient Name: Wolf Garcias        MRN: [id number]   ACCT#:  [de-identified]  : 1960  (66 y.o.)    Referring Provider (secondary): Dr. Agustin Mullen         Diagnosis: Primary Osteoarthritis of left shld and right shld  Treatment Diagnosis: Bilateral shld pain, neck pain    Onset Date: 23 (Referral)  PT Insurance Information: [address] [place] [address]  Total # of Visits Approved: 12 Per Physician Order  Total # of Visits to Date: 2  Plan of Care/Certification Expiration Date: 23    Pre-Treatment Pain:  0/10     Assessment  Assessment: Pt arrives reporting pain 0/10 but with activity, especially with shoulder  flexion, pain increases. Pt reports using pulleys in home and stretches as well. Plan to issue HEP to pt next visit. Plan  Continue with current plan of care    Exercises/Modalities/Manual:  See DocFlow Sheet    Education:           Goals  (Total # of Visits to Date: 2)   Short Term Goals  Time Frame for Short Term Goals: 8  Short Term Goal 1: Pt will increase strength  B/L shoulder flex from 4-/5 to 5/5 to improve ji. of ADLs and yard work  Short Term Goal 2: Pt will increase B/L shoulder ABD ROM  to 110deg to improve ji. to ADLs and yard work  Short Term Goal 3: Pt will increase L shoulder ER ROM from 31deg to 50 deg to improve ji. to ADLs    Long Term Goals  Time Frame for Long Term Goals : 12  Long Term Goal 1: Pt will decrease pain from a 6/10 to a 4/10 to improve ability to perform yard work.   Long Term Goal 2: Pt will increase UEFS from 60/80 to >65/80 to improve ability to perform ADLs    Post Treatment Pain:  4/10      Time In: 0920  Time Out: 1008  Timed Code Treatment Minutes: 48 Minutes  Total Treatment Time: 50 Minutes    Leann Candelario PTA     Date:

## 2023-05-04 ENCOUNTER — HOSPITAL ENCOUNTER (OUTPATIENT)
Dept: PHYSICAL THERAPY | Age: 63
Setting detail: THERAPIES SERIES
Discharge: HOME | End: 2023-05-04
Payer: COMMERCIAL

## 2023-05-04 PROCEDURE — 97110 THERAPEUTIC EXERCISES: CPT

## 2023-05-04 PROCEDURE — 97140 MANUAL THERAPY 1/> REGIONS: CPT

## 2023-05-04 NOTE — PROGRESS NOTES
Phone: [address]      Fax: [phone]                            Outpatient Physical Therapy                                                                            Daily Note    Date: 2023  Patient Name: Margarito Marshall        MRN: [id number]   ACCT#:  [de-identified]  : 1960  (66 y.o.)    Referring Provider (secondary): Dr. Olean Councilman         Diagnosis: Primary Osteoarthritis of left shld and right shld  Treatment Diagnosis: Bilateral shld pain, neck pain    Onset Date: 23 (Referral)  PT Insurance Information: [address] [place] [address]  Total # of Visits Approved: 12 Per Physician Order  Total # of Visits to Date: 3  Plan of Care/Certification Expiration Date: 23    Pre-Treatment Pain:  0/10     Assessment  Assessment: Patient denies pain today prior to session. Completes exercises as outlined above. Issued HEP from [place] and orange and greent tband to complete. Concluded session with manual and PROM to B shoulders. Plan  Continue with current plan of care    Exercises/Modalities/Manual:  See DocFlow Sheet    Education:  [place] Mcdaniel on [address]  (Total # of Visits to Date: 3)   Short Term Goals  Time Frame for Short Term Goals: 8  Short Term Goal 1: Pt will increase strength  B/L shoulder flex from 4-/5 to 5/5 to improve ji. of ADLs and yard work  Short Term Goal 2: Pt will increase B/L shoulder ABD ROM  to 110deg to improve ji. to ADLs and yard work  Short Term Goal 3: Pt will increase L shoulder ER ROM from 31deg to 50 deg to improve ji. to ADLs    Long Term Goals  Time Frame for Long Term Goals : 12  Long Term Goal 1: Pt will decrease pain from a 6/10 to a 4/10 to improve ability to perform yard work.   Long Term Goal 2: Pt will increase UEFS from 60/80 to >65/80 to improve ability to perform ADLs    Post Treatment Pain:  4/10    Time In: 1445    Time Out : 1530        Timed Code Treatment Minutes: 45

## 2023-05-09 ENCOUNTER — HOSPITAL ENCOUNTER (OUTPATIENT)
Dept: PHYSICAL THERAPY | Age: 63
Setting detail: THERAPIES SERIES
Discharge: HOME | End: 2023-05-09
Payer: COMMERCIAL

## 2023-05-09 PROCEDURE — 97140 MANUAL THERAPY 1/> REGIONS: CPT

## 2023-05-09 PROCEDURE — 97110 THERAPEUTIC EXERCISES: CPT

## 2023-05-09 ASSESSMENT — PAIN DESCRIPTION - ORIENTATION: ORIENTATION: RIGHT;LEFT

## 2023-05-09 ASSESSMENT — PAIN SCALES - GENERAL: PAINLEVEL_OUTOF10: 2

## 2023-05-09 ASSESSMENT — PAIN DESCRIPTION - LOCATION: LOCATION: SHOULDER

## 2023-05-09 NOTE — PROGRESS NOTES
Phone: [address]      Fax: [phone]                            Outpatient Physical Therapy                                                                            Daily Note    Date: 2023  Patient Name: Pili Dawson        MRN: [id number]   ACCT#:  [de-identified]  : 1960  (66 y.o.)    Referring Provider (secondary): Dr. Bridget Myles         Diagnosis: Primary Osteoarthritis of left shld and right shld  Treatment Diagnosis: Bilateral shld pain, neck pain  2  Onset Date: 23 (Referral)  PT Insurance Information: [address] [place] [address]  Total # of Visits Approved: 12 Per Physician Order  Total # of Visits to Date: 4  Plan of Care/Certification Expiration Date: 23    Pre-Treatment Pain:  2/10     Assessment  Assessment: Pain 2/10 in B shld at start of session. main c/o pain 6-8/10 at night interfering with sleep. Completed therex and manual therapy perr Doc flow. Progressed with strengthening ex with fair tolaerance. PROM shld ER R 70, L 40; abduction R 115, L 110 degrees. Continue per plan. Plan  Continue with current plan of care    Exercises/Modalities/Manual:  See DocFlow Sheet    Education:           Goals  (Total # of Visits to Date: 4)   Short Term Goals  Time Frame for Short Term Goals: 8  Short Term Goal 1: Pt will increase strength  B/L shoulder flex from 4-/5 to 5/5 to improve ji. of ADLs and yard work  Short Term Goal 2: Pt will increase B/L shoulder ABD ROM  to 110deg to improve ji. to ADLs and yard work- met  Short Term Goal 3: Pt will increase L shoulder ER ROM from 31deg to 50 deg to improve ji. to ADLs    Long Term Goals  Time Frame for Long Term Goals : 12  Long Term Goal 1: Pt will decrease pain from a 6/10 to a 4/10 to improve ability to perform yard work.   Long Term Goal 2: Pt will increase UEFS from 60/80 to >65/80 to improve ability to perform ADLs    Post Treatment Pain:  2/10    Time In: 9:05    Time Out : 9:50        Timed Code

## 2023-05-11 ENCOUNTER — HOSPITAL ENCOUNTER (OUTPATIENT)
Dept: PHYSICAL THERAPY | Age: 63
Setting detail: THERAPIES SERIES
Discharge: HOME | End: 2023-05-11
Payer: COMMERCIAL

## 2023-05-11 PROCEDURE — 97140 MANUAL THERAPY 1/> REGIONS: CPT

## 2023-05-11 PROCEDURE — 97110 THERAPEUTIC EXERCISES: CPT

## 2023-05-11 ASSESSMENT — PAIN SCALES - GENERAL: PAINLEVEL_OUTOF10: 2

## 2023-05-11 NOTE — PROGRESS NOTES
Phone: [address]      Fax: [phone]                            Outpatient Physical Therapy                                                                            Daily Note    Date: 2023  Patient Name: Ava Menon        MRN: [id number]   ACCT#:  [de-identified]  : 1960  (66 y.o.)    Referring Provider (secondary): Dr. Thelda Sever         Diagnosis: Primary Osteoarthritis of left shld and right shld  Treatment Diagnosis: Bilateral shld pain, neck pain    Onset Date: 23 (Referral)  PT Insurance Information: [address] [place] [address]  Total # of Visits Approved: 12 Per Physician Order  Total # of Visits to Date: 5  Plan of Care/Certification Expiration Date: 23    Pre-Treatment Pain:  2/10     Assessment  Assessment: PROM ER R 70 degrees; L ER 41deg. Pt reports pain 2/10 today. Did not perform upper trap stretch due to \"crack\" in the neck last time that was followed by headaches. Pt states he went to chriopractor for neck. Pt struggled with ER w Tband. Pt reports pain w sleeping in bed. Therex completed per doc flow. manual completed per doc flow. Plan  Continue with current plan of care    Exercises/Modalities/Manual:  See DocFlow Sheet    Education: Educated on scapular retraction form with therex. Goals  (Total # of Visits to Date: 5)   Short Term Goals  Time Frame for Short Term Goals: 8  Short Term Goal 1: Pt will increase strength  B/L shoulder flex from 4-/5 to 5/5 to improve ji. of ADLs and yard work  Short Term Goal 2: Pt will increase B/L shoulder ABD ROM  to 110deg to improve ji. to ADLs and yard work-Met  Short Term Goal 3: Pt will increase L shoulder ER ROM from 31deg to 50 deg to improve ji. to ADLs    Long Term Goals  Time Frame for Long Term Goals : 12  Long Term Goal 1: Pt will decrease pain from a 6/10 to a 4/10 to improve ability to perform yard work.   Long Term Goal 2: Pt will increase UEFS from 60/80 to >65/80 to improve

## 2023-05-16 ENCOUNTER — HOSPITAL ENCOUNTER (OUTPATIENT)
Dept: PHYSICAL THERAPY | Age: 63
Setting detail: THERAPIES SERIES
Discharge: HOME | End: 2023-05-16
Payer: COMMERCIAL

## 2023-05-16 PROCEDURE — 97110 THERAPEUTIC EXERCISES: CPT

## 2023-05-16 PROCEDURE — 97140 MANUAL THERAPY 1/> REGIONS: CPT

## 2023-05-16 ASSESSMENT — PAIN DESCRIPTION - LOCATION: LOCATION: SHOULDER

## 2023-05-16 ASSESSMENT — PAIN DESCRIPTION - ORIENTATION: ORIENTATION: RIGHT;LEFT

## 2023-05-16 ASSESSMENT — PAIN SCALES - GENERAL: PAINLEVEL_OUTOF10: 2

## 2023-05-18 ENCOUNTER — HOSPITAL ENCOUNTER (OUTPATIENT)
Dept: PHYSICAL THERAPY | Age: 63
Setting detail: THERAPIES SERIES
Discharge: HOME | End: 2023-05-18
Payer: COMMERCIAL

## 2023-05-18 PROCEDURE — 97110 THERAPEUTIC EXERCISES: CPT

## 2023-05-18 PROCEDURE — 97140 MANUAL THERAPY 1/> REGIONS: CPT

## 2023-05-18 ASSESSMENT — PAIN SCALES - GENERAL: PAINLEVEL_OUTOF10: 2

## 2023-05-18 NOTE — PROGRESS NOTES
Phone: Leela Barth      Fax: [phone]                            Outpatient Physical Therapy                                                                            Daily Note    Date: 2023  Patient Name: Chanelle Christianson        MRN: [id number]   ACCT#:  [de-identified]  : 1960  (66 y.o.)    Referring Provider (secondary): Dr. Maxine Underwood         Diagnosis: Primary Osteoarthritis of left shld and right shld  Treatment Diagnosis: Bilateral shld pain, neck pain    Onset Date: 23 (Referral)  PT Insurance Information: [address] [place] [address]  Total # of Visits Approved: 12 Per Physician Order  Total # of Visits to Date: 7  Plan of Care/Certification Expiration Date: 23    Pre-Treatment Pain:  2/10     Assessment  Assessment: Patient states B/L shoulder pain is a 2/10 this morning with L being a little worse. Completed shoulder and scapular strengthening exercises as outlined followed by shoulder mobs. L shoulder PROM ER @90 = 50 deg and AROM ER @90 = 43 deg. Patient decided to continue PT x1 more visit next week. Issued UEFS for patient to fill out and return next visit. Plan  Continue with current plan of care    Exercises/Modalities/Manual:  See DocFlow Sheet    Education: Jossy Adler to fill out and return next visit as patient planning to D/C     Goals  (Total # of Visits to Date: 7)   Short Term Goals  Time Frame for Short Term Goals: 8  Short Term Goal 1: Pt will increase strength  B/L shoulder flex from 4-/5 to 5/5 to improve ji. of ADLs and yard work  Short Term Goal 2: Pt will increase B/L shoulder ABD ROM  to 110deg to improve ji. to ADLs and yard work - MET  Short Term Goal 3: Pt will increase L shoulder ER ROM from 31deg to 50 deg to improve ji. to ADLs - MET    Long Term Goals  Time Frame for Long Term Goals : 12  Long Term Goal 1: Pt will decrease pain from a 6/10 to a 4/10 to improve ability to perform yard work.  - MET  Long Term Goal 2: Pt will Yes

## 2023-05-23 ENCOUNTER — HOSPITAL ENCOUNTER (OUTPATIENT)
Dept: PHYSICAL THERAPY | Age: 63
Setting detail: THERAPIES SERIES
Discharge: HOME | End: 2023-05-23
Payer: COMMERCIAL

## 2023-05-23 PROCEDURE — 97110 THERAPEUTIC EXERCISES: CPT

## 2023-05-23 PROCEDURE — 97140 MANUAL THERAPY 1/> REGIONS: CPT

## 2023-05-23 ASSESSMENT — PAIN SCALES - GENERAL: PAINLEVEL_OUTOF10: 2

## 2023-05-23 ASSESSMENT — PAIN DESCRIPTION - LOCATION: LOCATION: SHOULDER

## 2023-05-23 ASSESSMENT — PAIN DESCRIPTION - ORIENTATION: ORIENTATION: RIGHT;LEFT

## 2023-05-23 NOTE — PROGRESS NOTES
Phone: [address] Barth      Fax: [phone]                            Outpatient Physical Therapy                                                                            Daily Note    Date: 2023  Patient Name: Kale Owens        MRN: [id number]   ACCT#:  [de-identified]  : 1960  (66 y.o.)    Referring Provider (secondary): Dr. Britt Gardner         Diagnosis: Primary Osteoarthritis of left shld and right shld  Treatment Diagnosis: Bilateral shld pain, neck pain    Onset Date: 23 (Referral)  PT Insurance Information: [address] [place] [address]  Total # of Visits Approved: 12 Per Physician Order  Total # of Visits to Date: 8  Plan of Care/Certification Expiration Date: 23    Pre-Treatment Pain:  2/10     Assessment  Assessment: Patient reports B shld pain 2/10. Completed therex and manual therapy per Doc flow. PROM shld flexion R 155 , L 135 degrees ; abduction R 120, L 110 degrees ; ER R 75, L 55 degrees. Strength with MMT: shld flexion R 5/5, L 4+/5. UEFS score 63/80. Patient request discharge to [facility]. Plan  Discharge    Exercises/Modalities/Manual:  See DocFlow Sheet    Education:         Goals  (Total # of Visits to Date: 8)   Short Term Goals  Time Frame for Short Term Goals: 8  Short Term Goal 1: Pt will increase strength  B/L shoulder flex from 4-/5 to 5/5 to improve ji. of ADLs and yard work-Met  Short Term Goal 2: Pt will increase B/L shoulder ABD ROM  to 110deg to improve ji. to ADLs and yard work - MET  Short Term Goal 3: Pt will increase L shoulder ER ROM from 31deg to 50 deg to improve ji. to ADLs - MET    Long Term Goals  Time Frame for Long Term Goals : 12  Long Term Goal 1: Pt will decrease pain from a 6/10 to a 4/10 to improve ability to perform yard work.  - MET  Long Term Goal 2: Pt will increase UEFS from 60/80 to >65/80 to improve ability to perform ADLs-Not met    Post Treatment Pain:  2/10    Time In: 7:35    Time Out : 8:20        Timed

## 2023-05-23 NOTE — DISCHARGE SUMMARY
Phone: [address] Barth             Fax: [phone]                            Outpatient Physical Therapy                                                                    Discharge Summary    Patient: Fernandez Manriquez  : 1960  ACCT #: [de-identified]   Referring Provider (secondary): Dr. Mc Moraes      Diagnosis: Primary Osteoarthritis of left shld and right shld  Date Treatment Initiated: 23  Date of Last Treatment: 23    PT Visit Information  Onset Date: 23 (Referral)  PT Insurance Information: [address] [place] [address]  Total # of Visits Approved: 12  Total # of Visits to Date: 8    Frequency/Duration  Days: 2 times per week  Weeks: 6 weeks    Treatment Received  Patient Education/HEP, Therapeutic Exercise, and Manual Therapy: Mobilization/Manipulation    Pain Level: 2    Assessment  Assessment: Patient reports B shld pain 2/10. Completed therex and manual therapy per Doc flow. PROM shld flexion R 155 , L 135 degrees ; abduction R 120, L 110 degrees ; ER R 75, L 55 degrees. Strength with MMT: shld flexion R 5/5, L 4+/5. UEFS score 63/80. Patient request discharge to [facility]. Reason for Discharge  Patient Self Discharge- due to limited insurance coverage    Comments:   Thank you for this referral      Rubio Luis, PT  Date: 2023

## 2023-08-16 RX ORDER — LOSARTAN POTASSIUM 100 MG/1
TABLET ORAL
Qty: 90 TABLET | Refills: 1 | Status: SHIPPED | OUTPATIENT
Start: 2023-08-16

## 2023-08-16 NOTE — TELEPHONE ENCOUNTER
Last visit:  4/17/2023  Next Visit Date:  No future appointments. Medication List:  Prior to Admission medications    Medication Sig Start Date End Date Taking?  Authorizing Provider   meloxicam (MOBIC) 15 MG tablet Take 1 tablet by mouth daily 4/17/23   Royal Bis, DO   hydroCHLOROthiazide (HYDRODIURIL) 25 MG tablet Take 1 tablet by mouth daily 3/13/23   [facility], DO   losartan (COZAAR) 100 MG tablet Take 1 tablet by mouth daily 3/13/23   Royal Bis, DO   metoprolol succinate (TOPROL XL) 50 MG extended release tablet Take 1 tablet by mouth daily 3/13/23   [facility], DO   traMADol (ULTRAM) 50 MG tablet TAKE 1-2 TABLETS BY MOUTH EVERY 6 HOURS AS NEEDED FOR PAIN 11/15/22   Historical Provider, MD   aspirin EC 81 MG EC tablet Take 1 tablet by mouth daily 9/1/21   Royal Bis, DO   vitamin D (CHOLECALCIFEROL) 25 MCG (1000 UT) TABS tablet Take 1,000 Units by mouth daily    Historical Provider, MD   ascorbic acid (VITAMIN C) 1000 MG tablet Take 1,000 mg by mouth daily    Historical Provider, MD   GARLIC PO Take by mouth    Historical Provider, MD   Loratadine (CLARITIN PO) Take by mouth    Historical Provider, MD   fluticasone Tammy Dye ALLERGY RELIEF) 50 MCG/ACT nasal spray 1 spray by Nasal route daily 3/9/18   Royal Hancock, DO

## 2023-09-11 ENCOUNTER — OFFICE VISIT (OUTPATIENT)
Dept: FAMILY MEDICINE CLINIC | Age: 63
End: 2023-09-11
Payer: COMMERCIAL

## 2023-09-11 VITALS
WEIGHT: 233 LBS | OXYGEN SATURATION: 98 % | BODY MASS INDEX: 33.36 KG/M2 | SYSTOLIC BLOOD PRESSURE: 136 MMHG | DIASTOLIC BLOOD PRESSURE: 86 MMHG | HEART RATE: 70 BPM | HEIGHT: 70 IN

## 2023-09-11 DIAGNOSIS — R73.01 IFG (IMPAIRED FASTING GLUCOSE): ICD-10-CM

## 2023-09-11 DIAGNOSIS — I10 ESSENTIAL HYPERTENSION: Primary | ICD-10-CM

## 2023-09-11 DIAGNOSIS — E78.00 PURE HYPERCHOLESTEROLEMIA: ICD-10-CM

## 2023-09-11 DIAGNOSIS — Z12.5 SCREENING FOR PROSTATE CANCER: ICD-10-CM

## 2023-09-11 DIAGNOSIS — S76.311A STRAIN OF RIGHT HAMSTRING, INITIAL ENCOUNTER: ICD-10-CM

## 2023-09-11 PROCEDURE — 3078F DIAST BP <80 MM HG: CPT | Performed by: FAMILY MEDICINE

## 2023-09-11 PROCEDURE — 3074F SYST BP LT 130 MM HG: CPT | Performed by: FAMILY MEDICINE

## 2023-09-11 PROCEDURE — 99214 OFFICE O/P EST MOD 30 MIN: CPT | Performed by: FAMILY MEDICINE

## 2023-09-11 RX ORDER — METOPROLOL SUCCINATE 25 MG/1
25 TABLET, EXTENDED RELEASE ORAL DAILY
COMMUNITY
Start: 2023-07-09

## 2023-09-11 RX ORDER — HYDROCHLOROTHIAZIDE 25 MG/1
25 TABLET ORAL DAILY
Qty: 90 TABLET | Refills: 1 | Status: SHIPPED | OUTPATIENT
Start: 2023-09-11

## 2023-09-11 RX ORDER — MELOXICAM 15 MG/1
15 TABLET ORAL DAILY
Qty: 90 TABLET | Refills: 1 | Status: SHIPPED | OUTPATIENT
Start: 2023-09-11

## 2023-09-11 ASSESSMENT — PATIENT HEALTH QUESTIONNAIRE - PHQ9
SUM OF ALL RESPONSES TO PHQ QUESTIONS 1-9: 0
2. FEELING DOWN, DEPRESSED OR HOPELESS: 0
SUM OF ALL RESPONSES TO PHQ QUESTIONS 1-9: 0
SUM OF ALL RESPONSES TO PHQ QUESTIONS 1-9: 0
1. LITTLE INTEREST OR PLEASURE IN DOING THINGS: 0
SUM OF ALL RESPONSES TO PHQ QUESTIONS 1-9: 0
SUM OF ALL RESPONSES TO PHQ9 QUESTIONS 1 & 2: 0

## 2023-09-11 NOTE — PROGRESS NOTES
25 MG tablet 90 tablet 1     Sig: Take 1 tablet by mouth daily    meloxicam (MOBIC) 15 MG tablet 90 tablet 1     Sig: Take 1 tablet by mouth daily         Patient Instructions   At the pharmacy please ask about RSV and TdaP vaccines.       signed by Lizbet Stahl DO on 9/12/2023 at 11:06 PM  [id number]  [address] [place] [address]  Dept: [phone]

## 2023-10-04 RX ORDER — METOPROLOL SUCCINATE 25 MG/1
25 TABLET, EXTENDED RELEASE ORAL DAILY
Qty: 90 TABLET | Refills: 3 | Status: SHIPPED | OUTPATIENT
Start: 2023-10-04

## 2023-10-04 NOTE — TELEPHONE ENCOUNTER
Metoprolol 25 mg    DM-maximilian    Last check up 23    His old prescription has . Health Maintenance   Topic Date Due    COVID-19 Vaccine (1) Never done    HIV screen  Never done    Hepatitis C screen  Never done    DTaP/Tdap/Td vaccine (1 - Tdap) Never done    Shingles vaccine (1 of 2) Never done    A1C test (Diabetic or Prediabetic)  2019    Flu vaccine (1) 2023    Depression Screen  2024    Lipids  2025    Colorectal Cancer Screen  2026    Hepatitis A vaccine  Aged Out    Hepatitis B vaccine  Aged Out    Hib vaccine  Aged Out    Meningococcal (ACWY) vaccine  Aged Out    Prostate Specific Antigen (PSA) Screening or Monitoring  Discontinued             (applicable per patient's age: Cancer Screenings, Depression Screening, Fall Risk Screening, Immunizations)    Hemoglobin A1C (%)   Date Value   2018 5.8   2017 5.7     LDL Cholesterol (mg/dL)   Date Value   2020 122     LDL Calculated (mg/dL)   Date Value   2017 160     AST (U/L)   Date Value   2023 17     ALT (U/L)   Date Value   2023 22     BUN (mg/dL)   Date Value   2023 18      (goal A1C is < 7)   (goal LDL is <100) need 30-50% reduction from baseline     BP Readings from Last 3 Encounters:   23 136/86   23 123/76   23 (!) 168/96    (goal /80)      All Future Testing planned in [facility]:  Lab Frequency Next Occurrence   Comprehensive Metabolic Panel Once    Hemoglobin A1C Once 2023   Lipid Panel Once 2023   PSA Screening Once 2023       Next Visit Date:  No future appointments.          Patient Active Problem List:     HTN (hypertension)     GERD (gastroesophageal reflux disease)     Seasonal allergies     Pure hypercholesterolemia     Arthritis     Environmental and seasonal allergies     Essential tremor

## 2023-11-03 ENCOUNTER — HOSPITAL ENCOUNTER (OUTPATIENT)
Age: 63
Discharge: HOME | End: 2023-11-03
Payer: COMMERCIAL

## 2023-11-03 DIAGNOSIS — E78.00 PURE HYPERCHOLESTEROLEMIA: ICD-10-CM

## 2023-11-03 DIAGNOSIS — R73.01 IFG (IMPAIRED FASTING GLUCOSE): ICD-10-CM

## 2023-11-03 DIAGNOSIS — Z12.5 SCREENING FOR PROSTATE CANCER: ICD-10-CM

## 2023-11-03 LAB
ALBUMIN SERPL-MCNC: 4.1 G/DL (ref 3.5–5.2)
ALP SERPL-CCNC: 99 U/L (ref 40–129)
ALT SERPL-CCNC: 15 U/L (ref 5–41)
ANION GAP SERPL CALCULATED.3IONS-SCNC: 8 MMOL/L (ref 9–17)
AST SERPL-CCNC: 12 U/L
BILIRUB SERPL-MCNC: 0.6 MG/DL (ref 0.3–1.2)
BUN SERPL-MCNC: 14 MG/DL (ref 8–23)
BUN/CREAT SERPL: 18 (ref 9–20)
CALCIUM SERPL-MCNC: 9.8 MG/DL (ref 8.6–10.4)
CHLORIDE SERPL-SCNC: 104 MMOL/L (ref 98–107)
CHOLEST SERPL-MCNC: 174 MG/DL
CHOLESTEROL/HDL RATIO: 4.1
CO2 SERPL-SCNC: 26 MMOL/L (ref 20–31)
CREAT SERPL-MCNC: 0.8 MG/DL (ref 0.7–1.2)
EST. AVERAGE GLUCOSE BLD GHB EST-MCNC: 117 MG/DL
GFR SERPL CREATININE-BSD FRML MDRD: >60 ML/MIN/1.73M2
GLUCOSE SERPL-MCNC: 101 MG/DL (ref 70–99)
HBA1C MFR BLD: 5.7 % (ref 4–6)
HDLC SERPL-MCNC: 42 MG/DL
LDLC SERPL CALC-MCNC: 115 MG/DL (ref 0–130)
PATIENT FASTING?: YES
POTASSIUM SERPL-SCNC: 4.1 MMOL/L (ref 3.7–5.3)
PROT SERPL-MCNC: 6.6 G/DL (ref 6.4–8.3)
PSA SERPL-MCNC: 2.96 NG/ML
SODIUM SERPL-SCNC: 138 MMOL/L (ref 135–144)
TRIGL SERPL-MCNC: 87 MG/DL

## 2023-11-03 PROCEDURE — 80053 COMPREHEN METABOLIC PANEL: CPT

## 2023-11-03 PROCEDURE — 80061 LIPID PANEL: CPT

## 2023-11-03 PROCEDURE — 83036 HEMOGLOBIN GLYCOSYLATED A1C: CPT

## 2023-11-03 PROCEDURE — G0103 PSA SCREENING: HCPCS

## 2023-11-03 PROCEDURE — 36415 COLL VENOUS BLD VENIPUNCTURE: CPT

## 2024-02-08 ENCOUNTER — OFFICE VISIT (OUTPATIENT)
Dept: FAMILY MEDICINE CLINIC | Age: 64
End: 2024-02-08
Payer: COMMERCIAL

## 2024-02-08 VITALS
HEIGHT: 70 IN | WEIGHT: 238 LBS | OXYGEN SATURATION: 98 % | HEART RATE: 84 BPM | BODY MASS INDEX: 34.07 KG/M2 | DIASTOLIC BLOOD PRESSURE: 94 MMHG | SYSTOLIC BLOOD PRESSURE: 158 MMHG

## 2024-02-08 DIAGNOSIS — R07.81 RIB PAIN ON RIGHT SIDE: Primary | ICD-10-CM

## 2024-02-08 DIAGNOSIS — I10 ESSENTIAL HYPERTENSION: ICD-10-CM

## 2024-02-08 PROCEDURE — 3077F SYST BP >= 140 MM HG: CPT | Performed by: FAMILY MEDICINE

## 2024-02-08 PROCEDURE — 99214 OFFICE O/P EST MOD 30 MIN: CPT | Performed by: FAMILY MEDICINE

## 2024-02-08 PROCEDURE — 3080F DIAST BP >= 90 MM HG: CPT | Performed by: FAMILY MEDICINE

## 2024-02-08 RX ORDER — LOSARTAN POTASSIUM 100 MG/1
100 TABLET ORAL DAILY
Qty: 90 TABLET | Refills: 1 | Status: CANCELLED | OUTPATIENT
Start: 2024-02-08

## 2024-02-08 RX ORDER — HYDROCHLOROTHIAZIDE 25 MG/1
25 TABLET ORAL DAILY
Qty: 90 TABLET | Refills: 1 | Status: CANCELLED | OUTPATIENT
Start: 2024-02-08

## 2024-02-08 RX ORDER — MELOXICAM 15 MG/1
15 TABLET ORAL DAILY
Qty: 90 TABLET | Refills: 1 | Status: CANCELLED | OUTPATIENT
Start: 2024-02-08

## 2024-02-08 ASSESSMENT — PATIENT HEALTH QUESTIONNAIRE - PHQ9
2. FEELING DOWN, DEPRESSED OR HOPELESS: 0
SUM OF ALL RESPONSES TO PHQ QUESTIONS 1-9: 0
1. LITTLE INTEREST OR PLEASURE IN DOING THINGS: 0
SUM OF ALL RESPONSES TO PHQ9 QUESTIONS 1 & 2: 0

## 2024-02-08 NOTE — PATIENT INSTRUCTIONS
[employer] SURVEY:    You may be receiving a survey from [employer] regarding your visit today.    You may get this in the mail, through your MyChart or in your email.     Please complete the survey to enable us to provide the highest quality of care to you and your family.    If you cannot score us as very good ( 5 Stars) on any question, please feel free to call the office to discuss how we could have made your experience exceptional.     Thank you.    Clinical Care Team:   DO Ronald Elise CMA                                     Triage: Avelina Marvin CMA              Clerical Team:    Avelina Turpin

## 2024-02-08 NOTE — PROGRESS NOTES
Name: Anthony Malave  : 1960         Chief Complaint:     Chief Complaint   Patient presents with    Hypertension    Hyperlipidemia    Abdominal Pain     Right side pain with pressure or lifting. Noticed when cutting wood after [place]. Has pain off and on       History of Present Illness:      Anthony Malave is a 63 y.o.  male who presents with Hypertension, Hyperlipidemia, and Abdominal Pain (Right side pain with pressure or lifting. Noticed when cutting wood after [place]. Has pain off and on)      HPI    C/o RUQ and R flank pain, onset after xmas about a wk after he had been working with a lot of heavy wood. Notices most with turning torso to R, ie with looking back while driving tractor, and also with bending forward. Past weekend started having pain again, but this time also had the discomfort if he'd lean forward against counter putting pressure on anterior waistband. Started drinking lots of water and also some cranberry juice in case it was any kind of urinary issue. Hasn't had any urinary symptoms. Past few days outside doing yard work and the pain is still present but better than it had been. No change in stools. Had eaten some nuts over xmas, wondered if that could have caused any issues, but hasn't had diverticulitis in the past and isn't having any difficulty with BM's, L sided pain, pain assoc with eating.     Also c/o pain upper back and neck muscles which has been going on for a long time. Has bad arthritis negro shoulders and is planning for replacements but is waiting a few mos d/t building a house this Spring. Doing home exercises incl pulley for passive ROM of shoulders.    HTN - taking meds as directed and has felt well. Doesn't typically check BP at home.     Medical History:     Patient Active Problem List   Diagnosis    HTN (hypertension)    GERD (gastroesophageal reflux disease)    Pure hypercholesterolemia    Arthritis    Environmental and seasonal allergies    Essential tremor

## 2024-02-14 ENCOUNTER — NURSE ONLY (OUTPATIENT)
Dept: FAMILY MEDICINE CLINIC | Age: 64
End: 2024-02-14

## 2024-02-14 VITALS — OXYGEN SATURATION: 96 % | SYSTOLIC BLOOD PRESSURE: 140 MMHG | DIASTOLIC BLOOD PRESSURE: 90 MMHG | HEART RATE: 71 BPM

## 2024-02-14 DIAGNOSIS — I10 ESSENTIAL HYPERTENSION: Primary | ICD-10-CM

## 2024-02-14 RX ORDER — CELECOXIB 200 MG/1
200 CAPSULE ORAL DAILY
COMMUNITY

## 2024-02-22 ENCOUNTER — OFFICE VISIT (OUTPATIENT)
Dept: FAMILY MEDICINE CLINIC | Age: 64
End: 2024-02-22
Payer: COMMERCIAL

## 2024-02-22 VITALS
BODY MASS INDEX: 34.44 KG/M2 | WEIGHT: 240 LBS | TEMPERATURE: 98.6 F | HEART RATE: 76 BPM | OXYGEN SATURATION: 96 % | SYSTOLIC BLOOD PRESSURE: 150 MMHG | DIASTOLIC BLOOD PRESSURE: 98 MMHG

## 2024-02-22 DIAGNOSIS — J01.00 ACUTE NON-RECURRENT MAXILLARY SINUSITIS: ICD-10-CM

## 2024-02-22 DIAGNOSIS — R05.9 COUGH IN ADULT: Primary | ICD-10-CM

## 2024-02-22 DIAGNOSIS — R09.81 NASAL CONGESTION: ICD-10-CM

## 2024-02-22 DIAGNOSIS — J02.9 SORE THROAT: ICD-10-CM

## 2024-02-22 LAB
INFLUENZA A ANTIGEN, POC: NEGATIVE
INFLUENZA B ANTIGEN, POC: NEGATIVE
LOT NUMBER POC: NORMAL
SARS-COV-2 RNA POC - COV: NORMAL
VALID INTERNAL CONTROL, POC: PRESENT
VENDOR AND KIT NAME POC: NORMAL

## 2024-02-22 PROCEDURE — 3080F DIAST BP >= 90 MM HG: CPT | Performed by: STUDENT IN AN ORGANIZED HEALTH CARE EDUCATION/TRAINING PROGRAM

## 2024-02-22 PROCEDURE — 3077F SYST BP >= 140 MM HG: CPT | Performed by: STUDENT IN AN ORGANIZED HEALTH CARE EDUCATION/TRAINING PROGRAM

## 2024-02-22 PROCEDURE — 99213 OFFICE O/P EST LOW 20 MIN: CPT | Performed by: STUDENT IN AN ORGANIZED HEALTH CARE EDUCATION/TRAINING PROGRAM

## 2024-02-22 RX ORDER — AMOXICILLIN 500 MG/1
500 CAPSULE ORAL 2 TIMES DAILY
Qty: 14 CAPSULE | Refills: 0 | Status: SHIPPED | OUTPATIENT
Start: 2024-02-22 | End: 2024-02-29

## 2024-02-22 RX ORDER — BENZONATATE 100 MG/1
100-200 CAPSULE ORAL 3 TIMES DAILY PRN
Qty: 60 CAPSULE | Refills: 0 | Status: SHIPPED | OUTPATIENT
Start: 2024-02-22 | End: 2024-02-29

## 2024-02-22 ASSESSMENT — ENCOUNTER SYMPTOMS
NAUSEA: 0
COUGH: 1
SINUS PAIN: 1
VOICE CHANGE: 1
SORE THROAT: 1
SHORTNESS OF BREATH: 0
WHEEZING: 0
DIARRHEA: 0
SINUS PRESSURE: 1
ABDOMINAL PAIN: 0
VOMITING: 0

## 2024-02-22 NOTE — PATIENT INSTRUCTIONS
SURVEY:    You may be receiving a survey from [facility] today.    You may get this in the mail, through your MyChart or in your email.     Please complete the survey to enable us to provide the highest quality of care to you and your family.    If you cannot score us as very good ( 5 Stars) on any question, please feel free to call the office to discuss how we could have made your experience exceptional.     Thank you.    Clinical Care Team:  Dr. Keaton Leone, DO Carmen Pham LPN    Triage:  Avelina Marvin CMA    Clerical Team:  Avelina Turpin

## 2024-02-22 NOTE — PROGRESS NOTES
daily as needed for Cough 2/22/24 2/29/24 Yes Keaton Leone DO   amoxicillin (AMOXIL) 500 MG capsule Take 1 capsule by mouth 2 times daily for 7 days 2/22/24 2/29/24 Yes Keaton Leone DO   celecoxib (CELEBREX) 200 MG capsule Take 1 capsule by mouth daily   Yes Nay Aguilar MD   metoprolol succinate (TOPROL XL) 25 MG extended release tablet Take 1 tablet by mouth daily 10/4/23  Yes Mi Yoder DO   hydroCHLOROthiazide (HYDRODIURIL) 25 MG tablet Take 1 tablet by mouth daily 9/11/23  Yes Mi Yoder DO   losartan (COZAAR) 100 MG tablet TAKE 1 TABLET BY MOUTH EVERY DAY. 8/16/23  Yes Mi Yoder DO   aspirin EC 81 MG EC tablet Take 1 tablet by mouth daily 9/1/21  Yes Mi Yoder DO   vitamin D (CHOLECALCIFEROL) 25 MCG (1000 UT) TABS tablet Take 1 tablet by mouth daily   Yes Provider, MD Nay   ascorbic acid (VITAMIN C) 1000 MG tablet Take 1 tablet by mouth daily   Yes ProviderNay MD   meloxicam (MOBIC) 15 MG tablet Take 1 tablet by mouth daily  Patient not taking: Reported on 2/14/2024 9/11/23   Mi Yoder DO        Allergies:       Pneumococcal vaccines, Statins, and Tetracyclines & related    Social History:     Tobacco:    reports that he has never smoked. He quit smokeless tobacco use about 16 years ago.  His smokeless tobacco use included snuff.  Alcohol:      reports current alcohol use of about 4.0 standard drinks of alcohol per week.  Drug Use:  reports no history of drug use.    Family History:     Family History   Problem Relation Age of Onset    Arthritis Mother     Heart Attack Father        Review of Systems:     Review of Systems   Constitutional:  Positive for fever. Negative for appetite change and chills.   HENT:  Positive for congestion, sinus pressure, sinus pain, sore throat and voice change. Negative for sneezing.    Respiratory:  Positive for cough. Negative for shortness of breath and wheezing.    Cardiovascular:  Negative

## 2024-03-01 ENCOUNTER — TELEPHONE (OUTPATIENT)
Dept: FAMILY MEDICINE CLINIC | Age: 64
End: 2024-03-01

## 2024-03-01 DIAGNOSIS — J01.00 ACUTE NON-RECURRENT MAXILLARY SINUSITIS: Primary | ICD-10-CM

## 2024-03-01 RX ORDER — AMOXICILLIN 500 MG/1
500 CAPSULE ORAL 2 TIMES DAILY
Qty: 6 CAPSULE | Refills: 0 | Status: SHIPPED | OUTPATIENT
Start: 2024-03-01 | End: 2024-03-04

## 2024-03-01 NOTE — TELEPHONE ENCOUNTER
Will send an additional 3 days of amoxicillin so he can have 10 days of total treatment.  Have him call us on Monday to update us of his progress.

## 2024-03-01 NOTE — TELEPHONE ENCOUNTER
Anthony was in the office on 2/22 for URI. He was prescribed tessalon cap and amoxicillin. He felt better but then started with a sore throat, sinus drainage and having some discoloration in his mucus. Starting to feeling bad again. Wanted to know if he could have something else called in for him. Please let Anthony know.      Health Maintenance   Topic Date Due    COVID-19 Vaccine (1) Never done    HIV screen  Never done    Hepatitis C screen  Never done    DTaP/Tdap/Td vaccine (1 - Tdap) Never done    Shingles vaccine (1 of 2) Never done    Respiratory Syncytial Virus (RSV) Pregnant or age 60 yrs+ (1 - 1-dose 60+ series) Never done    Flu vaccine (1) 08/01/2023    A1C test (Diabetic or Prediabetic)  11/03/2024    Depression Screen  02/08/2025    Colorectal Cancer Screen  07/11/2026    Lipids  11/03/2028    Hepatitis A vaccine  Aged Out    Hepatitis B vaccine  Aged Out    Hib vaccine  Aged Out    Polio vaccine  Aged Out    Meningococcal (ACWY) vaccine  Aged Out    Prostate Specific Antigen (PSA) Screening or Monitoring  Discontinued             (applicable per patient's age: Cancer Screenings, Depression Screening, Fall Risk Screening, Immunizations)    Hemoglobin A1C (%)   Date Value   11/03/2023 5.7   08/31/2018 5.8   08/18/2017 5.7     LDL Cholesterol (mg/dL)   Date Value   11/03/2023 115     LDL Calculated (mg/dL)   Date Value   08/18/2017 160     AST (U/L)   Date Value   11/03/2023 12     ALT (U/L)   Date Value   11/03/2023 15     BUN (mg/dL)   Date Value   11/03/2023 14      (goal A1C is < 7)   (goal LDL is <100) need 30-50% reduction from baseline     BP Readings from Last 3 Encounters:   02/22/24 (!) 150/98   02/14/24 (!) 140/90   02/08/24 (!) 158/94    (goal /80)      All Future Testing planned in [facility]:  Lab Frequency Next Occurrence       Next Visit Date:  Future Appointments   Date Time Provider Department Center   3/6/2024  7:20 AM Mi Yoder DO [facility]            Patient Active

## 2024-03-06 ENCOUNTER — OFFICE VISIT (OUTPATIENT)
Dept: FAMILY MEDICINE CLINIC | Age: 64
End: 2024-03-06
Payer: COMMERCIAL

## 2024-03-06 VITALS
HEIGHT: 70 IN | WEIGHT: 238 LBS | SYSTOLIC BLOOD PRESSURE: 138 MMHG | HEART RATE: 80 BPM | DIASTOLIC BLOOD PRESSURE: 86 MMHG | BODY MASS INDEX: 34.07 KG/M2 | OXYGEN SATURATION: 97 %

## 2024-03-06 DIAGNOSIS — I10 ESSENTIAL HYPERTENSION: Primary | ICD-10-CM

## 2024-03-06 DIAGNOSIS — M54.2 NECK PAIN: ICD-10-CM

## 2024-03-06 PROCEDURE — 99213 OFFICE O/P EST LOW 20 MIN: CPT | Performed by: FAMILY MEDICINE

## 2024-03-06 PROCEDURE — 3075F SYST BP GE 130 - 139MM HG: CPT | Performed by: FAMILY MEDICINE

## 2024-03-06 PROCEDURE — 3079F DIAST BP 80-89 MM HG: CPT | Performed by: FAMILY MEDICINE

## 2024-03-06 RX ORDER — MELOXICAM 15 MG/1
15 TABLET ORAL DAILY
Qty: 90 TABLET | Refills: 1 | Status: SHIPPED | OUTPATIENT
Start: 2024-03-06

## 2024-03-06 RX ORDER — HYDROCHLOROTHIAZIDE 25 MG/1
25 TABLET ORAL DAILY
Qty: 90 TABLET | Refills: 1 | Status: SHIPPED | OUTPATIENT
Start: 2024-03-06

## 2024-03-06 RX ORDER — LOSARTAN POTASSIUM 100 MG/1
100 TABLET ORAL DAILY
Qty: 90 TABLET | Refills: 1 | Status: SHIPPED | OUTPATIENT
Start: 2024-03-06

## 2024-03-06 NOTE — PROGRESS NOTES
of MSK pains and now that he's off cold meds.   Chronic, improved with chiro. Advised home exercises which he's been given in the past, stretching, cont nsaid - finds currently that mobic works best.     Requested Prescriptions     Signed Prescriptions Disp Refills    hydroCHLOROthiazide (HYDRODIURIL) 25 MG tablet 90 tablet 1     Sig: Take 1 tablet by mouth daily    losartan (COZAAR) 100 MG tablet 90 tablet 1     Sig: Take 1 tablet by mouth daily    meloxicam (MOBIC) 15 MG tablet 90 tablet 1     Sig: Take 1 tablet by mouth daily         Patient Instructions   [employer] SURVEY:    You may be receiving a survey from [employer] regarding your visit today.    You may get this in the mail, through your MyChart or in your email.     Please complete the survey to enable us to provide the highest quality of care to you and your family.    If you cannot score us as very good ( 5 Stars) on any question, please feel free to call the office to discuss how we could have made your experience exceptional.     Thank you.    Clinical Care Team:   DO Ronald Elise CMA                                     Triage: Avelina Marvin CMA              Clerical Team:    Avelina Turpin        signed by Mi Yoder DO on 3/10/2024 at 9:04 PM  [facility]  [address] [address]  Dept: [phone]

## 2024-04-12 ENCOUNTER — OFFICE VISIT (OUTPATIENT)
Dept: FAMILY MEDICINE CLINIC | Age: 64
End: 2024-04-12
Payer: COMMERCIAL

## 2024-04-12 VITALS
BODY MASS INDEX: 34.93 KG/M2 | HEIGHT: 70 IN | SYSTOLIC BLOOD PRESSURE: 136 MMHG | HEART RATE: 76 BPM | OXYGEN SATURATION: 97 % | DIASTOLIC BLOOD PRESSURE: 86 MMHG | WEIGHT: 244 LBS

## 2024-04-12 DIAGNOSIS — H10.12 ALLERGIC CONJUNCTIVITIS OF LEFT EYE: Primary | ICD-10-CM

## 2024-04-12 PROCEDURE — 99213 OFFICE O/P EST LOW 20 MIN: CPT | Performed by: NURSE PRACTITIONER

## 2024-04-12 PROCEDURE — 3075F SYST BP GE 130 - 139MM HG: CPT | Performed by: NURSE PRACTITIONER

## 2024-04-12 PROCEDURE — 3079F DIAST BP 80-89 MM HG: CPT | Performed by: NURSE PRACTITIONER

## 2024-04-12 RX ORDER — OLOPATADINE HYDROCHLORIDE 1 MG/ML
1 SOLUTION/ DROPS OPHTHALMIC 2 TIMES DAILY
Qty: 5 ML | Refills: 0 | Status: SHIPPED | OUTPATIENT
Start: 2024-04-12 | End: 2024-05-12

## 2024-04-12 SDOH — ECONOMIC STABILITY: FOOD INSECURITY: WITHIN THE PAST 12 MONTHS, THE FOOD YOU BOUGHT JUST DIDN'T LAST AND YOU DIDN'T HAVE MONEY TO GET MORE.: NEVER TRUE

## 2024-04-12 SDOH — ECONOMIC STABILITY: FOOD INSECURITY: WITHIN THE PAST 12 MONTHS, YOU WORRIED THAT YOUR FOOD WOULD RUN OUT BEFORE YOU GOT MONEY TO BUY MORE.: NEVER TRUE

## 2024-04-12 SDOH — ECONOMIC STABILITY: INCOME INSECURITY: HOW HARD IS IT FOR YOU TO PAY FOR THE VERY BASICS LIKE FOOD, HOUSING, MEDICAL CARE, AND HEATING?: NOT HARD AT ALL

## 2024-04-12 ASSESSMENT — ENCOUNTER SYMPTOMS
DIARRHEA: 0
EYE DISCHARGE: 1
EYE REDNESS: 1
VOMITING: 0
NAUSEA: 0
EYE PAIN: 1
EYE ITCHING: 1
SHORTNESS OF BREATH: 0
COUGH: 0
PHOTOPHOBIA: 0

## 2024-04-12 NOTE — PROGRESS NOTES
HPI Notes    Name: Anthony Malave  : 1960         Chief Complaint:     Chief Complaint   Patient presents with    Eye Pain     Started yesterday morning with irritation, swelling, drainage, itching. Pt states his wife had pink eye a few weeks ago or he thinks he got something in it while mowing.       History of Present Illness:        Eye Pain   The left eye is affected. This is a new problem. The current episode started in the past 7 days. The problem occurs intermittently. The problem has been waxing and waning. The injury mechanism was a foreign body. The pain is mild. There is No known exposure to pink eye. He Does not wear contacts. Associated symptoms include an eye discharge, eye redness and itching. Pertinent negatives include no fever, nausea, photophobia or vomiting. He has tried nothing for the symptoms.       Past Medical History:     Past Medical History:   Diagnosis Date    GERD (gastroesophageal reflux disease) 2013    HTN (hypertension) 2013    Hyperlipidemia 3/26/2014    Osteoarthritis     Dr Pompa    Pneumonia     Seasonal allergies     Toe fracture, left       Reviewed all health maintenance requirements and ordered appropriate tests  Health Maintenance Due   Topic Date Due    COVID-19 Vaccine (1) Never done    HIV screen  Never done    Hepatitis C screen  Never done    DTaP/Tdap/Td vaccine (1 - Tdap) Never done    Shingles vaccine (1 of 2) Never done    Respiratory Syncytial Virus (RSV) Pregnant or age 60 yrs+ (1 - 1-dose 60+ series) Never done       Past Surgical History:     Past Surgical History:   Procedure Laterality Date    KNEE SURGERY Right     TOTAL HIP ARTHROPLASTY Left 11/15/2022    TOTAL HIP ARTHROPLASTY Right 2023        Medications:       Prior to Admission medications    Medication Sig Start Date End Date Taking? Authorizing Provider   olopatadine (PATANOL) 0.1 % ophthalmic solution Place 1 drop into the left eye 2 times daily 24 Yes Samir

## 2024-08-23 ENCOUNTER — OFFICE VISIT (OUTPATIENT)
Dept: FAMILY MEDICINE CLINIC | Age: 64
End: 2024-08-23
Payer: COMMERCIAL

## 2024-08-23 VITALS
DIASTOLIC BLOOD PRESSURE: 90 MMHG | WEIGHT: 232 LBS | BODY MASS INDEX: 33.21 KG/M2 | SYSTOLIC BLOOD PRESSURE: 154 MMHG | HEIGHT: 70 IN | HEART RATE: 60 BPM

## 2024-08-23 DIAGNOSIS — G89.29 CHRONIC PAIN OF BOTH SHOULDERS: ICD-10-CM

## 2024-08-23 DIAGNOSIS — M25.511 CHRONIC PAIN OF BOTH SHOULDERS: ICD-10-CM

## 2024-08-23 DIAGNOSIS — M25.512 CHRONIC PAIN OF BOTH SHOULDERS: ICD-10-CM

## 2024-08-23 DIAGNOSIS — I10 ESSENTIAL HYPERTENSION: Primary | ICD-10-CM

## 2024-08-23 PROCEDURE — 3080F DIAST BP >= 90 MM HG: CPT | Performed by: FAMILY MEDICINE

## 2024-08-23 PROCEDURE — 99213 OFFICE O/P EST LOW 20 MIN: CPT | Performed by: FAMILY MEDICINE

## 2024-08-23 PROCEDURE — 3077F SYST BP >= 140 MM HG: CPT | Performed by: FAMILY MEDICINE

## 2024-08-23 RX ORDER — HYDROCHLOROTHIAZIDE 25 MG/1
25 TABLET ORAL DAILY
Qty: 90 TABLET | Refills: 1 | Status: SHIPPED | OUTPATIENT
Start: 2024-08-23

## 2024-08-23 RX ORDER — LOSARTAN POTASSIUM 100 MG/1
100 TABLET ORAL DAILY
Qty: 90 TABLET | Refills: 1 | Status: SHIPPED | OUTPATIENT
Start: 2024-08-23

## 2024-08-23 RX ORDER — HYDROCHLOROTHIAZIDE 25 MG/1
25 TABLET ORAL DAILY
Qty: 90 TABLET | Refills: 1 | OUTPATIENT
Start: 2024-08-23

## 2024-08-23 RX ORDER — MELOXICAM 15 MG/1
15 TABLET ORAL DAILY
Qty: 90 TABLET | Refills: 1 | Status: SHIPPED | OUTPATIENT
Start: 2024-08-23

## 2024-08-23 RX ORDER — METOPROLOL SUCCINATE 25 MG/1
25 TABLET, EXTENDED RELEASE ORAL DAILY
Qty: 90 TABLET | Refills: 1 | Status: SHIPPED | OUTPATIENT
Start: 2024-08-23

## 2024-08-23 RX ORDER — LOSARTAN POTASSIUM 100 MG/1
100 TABLET ORAL DAILY
Qty: 90 TABLET | Refills: 1 | OUTPATIENT
Start: 2024-08-23

## 2024-08-23 ASSESSMENT — PATIENT HEALTH QUESTIONNAIRE - PHQ9
SUM OF ALL RESPONSES TO PHQ QUESTIONS 1-9: 0
SUM OF ALL RESPONSES TO PHQ QUESTIONS 1-9: 0
1. LITTLE INTEREST OR PLEASURE IN DOING THINGS: NOT AT ALL
2. FEELING DOWN, DEPRESSED OR HOPELESS: NOT AT ALL
SUM OF ALL RESPONSES TO PHQ9 QUESTIONS 1 & 2: 0
SUM OF ALL RESPONSES TO PHQ QUESTIONS 1-9: 0
SUM OF ALL RESPONSES TO PHQ QUESTIONS 1-9: 0

## 2024-08-23 NOTE — PROGRESS NOTES
Name: Anthony Malave  : 1960         Chief Complaint:     Chief Complaint   Patient presents with    Hypertension    Hyperlipidemia       History of Present Illness:      Anthony Malave is a 64 y.o.  male who presents with Hypertension and Hyperlipidemia      HPI    Zhen shoulder pain continues, plans on replacements next yr, L first. Continues meloxicam daily. Has had steroid injections without help. Home exercises incl pulley to help with mobility.     HTN - feeling well and taking meds as directed. Checks sometimes at home and will find it 126-135/82-90. Admits sodium intake may be too high - has been pointed out by wife. Recalls few mos ago when BP high it seemed to be r/t something vagal nerve as dx by chiro, had frequent tx and finally got it worked out. Not bothering him at this time.     Medical History:     Patient Active Problem List   Diagnosis    HTN (hypertension)    GERD (gastroesophageal reflux disease)    Pure hypercholesterolemia    Arthritis    Environmental and seasonal allergies    Essential tremor       Medications:       Prior to Admission medications    Medication Sig Start Date End Date Taking? Authorizing Provider   hydroCHLOROthiazide (HYDRODIURIL) 25 MG tablet Take 1 tablet by mouth daily 24  Yes Mi Yoder DO   losartan (COZAAR) 100 MG tablet Take 1 tablet by mouth daily 24  Yes Mi Yoder DO   meloxicam (MOBIC) 15 MG tablet Take 1 tablet by mouth daily 24  Yes Mi Yoder DO   metoprolol succinate (TOPROL XL) 25 MG extended release tablet Take 1 tablet by mouth daily 24  Yes Mi Yoder DO   aspirin EC 81 MG EC tablet Take 1 tablet by mouth daily 21  Yes Mi Yoder DO   vitamin D (CHOLECALCIFEROL) 25 MCG (1000 UT) TABS tablet Take 1 tablet by mouth daily   Yes Provider, MD Nay   ascorbic acid (VITAMIN C) 1000 MG tablet Take 1 tablet by mouth daily   Yes Provider, MD Nay        Allergies:

## 2024-08-23 NOTE — PATIENT INSTRUCTIONS
[employer] SURVEY:    You may be receiving a survey from [employer] regarding your visit today.    You may get this in the mail, through your MyChart or in your email.     Please complete the survey to enable us to provide the highest quality of care to you and your family.    If you cannot score us as very good ( 5 Stars) on any question, please feel free to call the office to discuss how we could have made your experience exceptional.     Thank you.    Clinical Care Team:   DO Ronald Elise CMA                                     Triage: Avelina Marvin CMA              Clerical Team:    Avelina Turpin     Kelso [phone]           Billing questions: [phone]           Medical Records Request: [phone]

## 2024-10-02 NOTE — PROGRESS NOTES
Patient : Rhett Serrato Age: 26 year old Sex: male   MRN: [id number] Encounter Date: 10/1/2024    History     Chief Complaint   Patient presents with    Abdominal Pain       HPI    Rhett Serrato is a 26 year old presenting to the emergency department with complaint of right lower quadrant abdominal pain for the past week.  Patient states he initially went to his primary care provider who told him to continue to monitor his pain and when he went back again today he states that the pain was worsening and therefore his primary sent him to get a scan of his abdomen.  Patient states that the abdominal pain is mostly dull however he will sometimes get sharp right lower quadrant abdominal pain.  Patient denies any nausea, vomiting, fevers, chills and denies any urinary symptoms such as burning with urination or frequency.  Patient does state some diarrhea at times.  Patient denies any abdominal surgical history.  Patient states he is concerned as his father had a history of a perforated appendicitis so he wants to make sure that this is not what was going on today.    No Known Allergies    No current facility-administered medications for this encounter.     Current Outpatient Medications   Medication Sig    omeprazole (PRILOSEC) 40 MG capsule Take 1 capsule by mouth daily.       No past medical history on file.    No past surgical history on file.    Family History   Problem Relation Age of Onset    Patient is unaware of any medical problems Mother     Patient is unaware of any medical problems Father        Social History     Tobacco Use    Smoking status: Never    Smokeless tobacco: Never   Substance Use Topics    Alcohol use: Never    Drug use: Never       Review of Systems     Review of Systems   Constitutional:  Negative for chills and fever.   Gastrointestinal:  Positive for abdominal pain. Negative for constipation, diarrhea, nausea and vomiting.   Genitourinary:  Negative for dysuria.   All other systems  Phone: Leela Barth      Fax: [phone]                            Outpatient Physical Therapy                                                                            Daily Note    Date: 2022  Patient Name: Augusta Hitchcock        MRN: [id number]   ACCT#:  [de-identified]  : 1960  (63 y.o.)    Referring Provider (secondary): Dr. Nathan Rae         Diagnosis: Left THR  Treatment Diagnosis: Left  hip weakness    Onset Date: 11/15/22  PT Insurance Information: [employer]    Per Physician Order  Total # of Visits to Date: 4  No Show: 0  Canceled Appointment: 0  Plan of Care/Certification Expiration Date: 23      Pre-Treatment Pain:  0-1/10     Assessment  Assessment: Pt reports pain in left hip less than 1/10. Pain in right 4/10. Pt at times is walking with cane in left hand d/t pain is worse in right hip and it helps him to stand taller. Performed ex as outlined. Added supine tband clamshell for strengthening of hips. Pt is compliant with HEP. He is also elevating and icing. He voices frustration that Dr advised him not to sleep in the recliner. Will cont and progress with sidelying hip abduction as pt gains strength.     Plan  Continue with current plan of care    Exercises/Modalities/Manual:  See DocFlow Sheet            Goals  (Total # of Visits to Date: 4)   Short Term Goals  Time Frame for Short Term Goals: 3 visits  Short Term Goal 1: Educate/upgrade home program for left hip/LE strengthening and proper gait pattern- MET    Long Term Goals  Time Frame for Long Term Goals : 16 visits  Long Term Goal 1: Ambulate without device in home;  cane in community as needed  Long Term Goal 2: Left hip abduction strength 4+/5 to ambulate without deviation due to hip weakness  Long Term Goal 3: Left knee extension strength 4+/5 to complete reciprical stair amb  Long Term Goal 4: Subjective improvement in functional mobility with LEFS > 50/80    Post Treatment Pain: 0-1/10    Time In: 0730    Time Out : 0805        Timed Code Treatment Minutes: 35 Minutes  Total Treatment Time: 35 Minutes    Jorge Costa     Date: 11/28/2022/10     Assessment  Assessment: Pt reports pain in left hip less than 1/10. Pain in right 4/10. Pt at times is walking with cane in left hand d/t pain is worse in right hip and it helps him to stand taller. Performed ex as outlined. Added supine tband clamshell for strengthening of hips. Pt is compliant with HEP. He is also elevating and icing. He voices frustration that Dr advised him not to sleep in the recliner. Will cont and progress with sidelying hip abduction as pt gains strength.     Plan  Continue with current plan of care    Exercises/Modalities/Manual:  See DocFlow Sheet          Goals  (Total # of Visits to Date: 4)   Short Term Goals  Time Frame for Short Term Goals: 3 visits  Short Term Goal 1: Educate/upgrade home program for left hip/LE strengthening and proper gait pattern- MET    Long Term Goals  Time Frame for Long Term Goals : 16 visits  Long Term Goal 1: Ambulate without device in home;  cane in community as needed  Long Term Goal 2: Left hip abduction strength 4+/5 to ambulate without deviation due to hip weakness  Long Term Goal 3: Left knee extension strength 4+/5 to complete reciprical stair amb  Long Term Goal 4: Subjective improvement in functional mobility with LEFS > 50/80    Post Treatment Pain:  0-1/10    Time In: 0730    Time Out : 0805        Timed Code Treatment Minutes: 35 Minutes  Total Treatment Time: 35 Minutes    Jorge Costa    PTA Date: 11/28/2022 reviewed and are negative.      Physical Exam     ED Triage Vitals [10/01/24 1741]   ED Triage Vitals Group      Temp 98.8 °F (37.1 °C)      Heart Rate 76      Resp 18      /87      SpO2 100 %      EtCO2 mmHg       Height       Weight       Weight Scale Used       BMI (Calculated)       IBW/kg (Calculated)        Physical Exam  Vitals and nursing note reviewed.   Constitutional:       General: He is not in acute distress.     Appearance: Normal appearance. He is normal weight. He is not ill-appearing, toxic-appearing or diaphoretic.   HENT:      Head: Normocephalic and atraumatic.      Right Ear: External ear normal.      Left Ear: External ear normal.      Nose: Nose normal.      Neck: Normal range of motion and neck supple. No rigidity.   Eyes:      Conjunctiva/sclera: Conjunctivae normal.      Pupils: Pupils are equal, round, and reactive to light.   Cardiovascular:      Rate and Rhythm: Normal rate and regular rhythm.      Heart sounds: Normal heart sounds. No murmur heard.     Comments: Radial pulses 2+ symmetric bilaterally.  Pulmonary:      Effort: Pulmonary effort is normal. No respiratory distress.      Breath sounds: Normal breath sounds. No stridor. No wheezing, rhonchi or rales.   Chest:      Chest wall: No tenderness.   Abdominal:      General: Bowel sounds are normal. There is no distension.      Palpations: Abdomen is soft.      Tenderness: There is no abdominal tenderness. There is no guarding. Negative signs include Brown's sign and McBurney's sign.      Comments: No objective abdominal tenderness to palpation. Abdomen is soft without guarding or peritonitic signs   Musculoskeletal:      Right lower leg: No edema.      Left lower leg: No edema.      Comments: Normal range of motion of upper and lower extremities bilaterally.   Skin:     General: Skin is warm and dry.   Neurological:      Mental Status: He is alert and oriented to person, place, and time.      Cranial Nerves: No cranial nerve  deficit.   Psychiatric:         Mood and Affect: Mood normal.         Behavior: Behavior normal.         Procedures     Procedures    Lab Results     Results for orders placed or performed during the hospital encounter of 10/01/24   Comprehensive Metabolic Panel    Specimen: Blood, Venous   Result Value Ref Range    Fasting Status      Sodium 138 135 - 145 mmol/L    Potassium 4.1 3.4 - 5.1 mmol/L    Chloride 105 97 - 110 mmol/L    Carbon Dioxide 29 21 - 32 mmol/L    Anion Gap 8 7 - 19 mmol/L    Glucose 90 70 - 99 mg/dL    BUN 14 6 - 20 mg/dL    Creatinine 0.89 0.67 - 1.17 mg/dL    Glomerular Filtration Rate >90 >=60    BUN/Cr 16 7 - 25    Calcium 9.4 8.4 - 10.2 mg/dL    Bilirubin, Total 0.6 0.2 - 1.0 mg/dL    GOT/AST 18 <=37 Units/L    GPT/ALT 17 <64 Units/L    Alkaline Phosphatase 78 45 - 117 Units/L    Albumin 4.3 3.4 - 5.0 g/dL    Protein, Total 8.1 6.4 - 8.2 g/dL    Globulin 3.8 2.0 - 4.0 g/dL    A/G Ratio 1.1 1.0 - 2.4   Lipase    Specimen: Blood, Venous   Result Value Ref Range    Lipase 22 15 - 77 Units/L   CBC with Automated Differential (performable only)    Specimen: Blood, Venous   Result Value Ref Range    WBC 7.9 4.2 - 11.0 K/mcL    RBC 5.00 4.50 - 5.90 mil/mcL    HGB 15.2 13.0 - 17.0 g/dL    HCT 44.5 39.0 - 51.0 %    MCV 89.0 78.0 - 100.0 fl    MCH 30.4 26.0 - 34.0 pg    MCHC 34.2 32.0 - 36.5 g/dL    RDW-CV 11.8 11.0 - 15.0 %    RDW-SD 38.2 (L) 39.0 - 50.0 fL     140 - 450 K/mcL    NRBC 0 <=0 /100 WBC    Neutrophil, Percent 52 %    Lymphocytes, Percent 29 %    Mono, Percent 16 %    Eosinophils, Percent 2 %    Basophils, Percent 1 %    Immature Granulocytes 0 %    Absolute Neutrophils 4.1 1.8 - 7.7 K/mcL    Absolute Lymphocytes 2.3 1.0 - 4.8 K/mcL    Absolute Monocytes 1.3 (H) 0.3 - 0.9 K/mcL    Absolute Eosinophils  0.1 0.0 - 0.5 K/mcL    Absolute Basophils 0.1 0.0 - 0.3 K/mcL    Absolute Immature Granulocytes 0.0 0.0 - 0.2 K/mcL     Radiology Results     Imaging Results              CT ABDOMEN  PELVIS W CONTRAST (Final result)  Result time 10/01/24 20:20:27      Final result                   Impression:    No acute findings.        Electronically Signed by: PHILLIP BANEGAS M.D.   Signed on: 10/1/2024 8:20 PM   Workstation ID: [id number]               Narrative:    CT ABDOMEN AND PELVIS    HISTORY: RLQ abdominal pain    TECHNIQUE: Helical axial CT images of the abdomen and pelvis were obtained  with 80 cc Omnipaque 350 IV contrast.      One or more of the following radiation dose reduction techniques were used  for this examination: Exposure control, adjustment of the mA and/or kV  according to patient size, or use of iterative reconstruction technique.    COMPARISON: None.    FINDINGS:    Lung bases are clear.    Liver, spleen, pancreas, gallbladder, adrenal glands, and kidneys are  within normal limits. Abdominal aorta is of normal caliber. Urinary bladder  and prostate are within normal limits.    Moderate stool in colon is noted. Visualized appendix is within normal  limits. No evidence of bowel obstruction is noted.                                      ED Medications     ED Medication Orders (From admission, onward)      Ordered Start     Status Ordering Provider    10/01/24 1754 10/01/24 1800  sodium chloride (NORMAL SALINE) 0.9 % bolus 1,000 mL  ONCE         Last MAR action: Completed EVELIO ARTHUR            ED Course     Vitals:    10/01/24 1741 10/01/24 1755 10/01/24 1822 10/01/24 2028   BP: 135/87 92/57 124/80 124/80   BP Location:   LUE - Left upper extremity LUE - Left upper extremity   Patient Position:   Sitting Sitting   Pulse: 76  71 81   Resp: 18  18 18   Temp: 98.8 °F (37.1 °C)      SpO2: 100%  99% 100%                Medical Decision Making     8:37 PM                         Patient is a 26 year old with complaint of RLQ abdominal pain for over a week. On presentation, patient is afebrile without tachycardia, tachypnea, hypotension, hypoxia on room air.  On physical exam, patient  is well-appearing and in no acute distress, abdomen is soft without guarding or peritonitic signs, no objective abdominal tenderness to palpation. Patient given 1 L of IV fluids in the emergency room.  Workup in the ED shows CBC without leukocytosis, CMP unremarkable, lipase not elevated.  CT abdomen/pelvis ordered in the ED does not show any acute concerning findings, no bowel obstruction, appendix is visualized and normal, there is moderate stool in the colon.  Checked back in on patient, patient resting comfortably in room, no acute pain at this time.  I discussed with patient all lab and imaging results which are unremarkable at this time, no sign of surgical emergency or infection/abnormalities.  I discussed with patient unknown exact cause of his abdominal pain, this may be related to the moderate stool that I see on the CT scan.  I discussed with patient that he can continue with Tylenol and ibuprofen for pain control, is to rest, drink plenty of fluids and eat a soft bland diet.  Discussed with patient to follow-up with primary care provider within the next few days for reassessment and I discussed with patient strict return precautions including changing or worsening pain, new fevers or any new symptoms.  I did discuss with patient that he may need further evaluation and management with the GI for further lab tests, potential colonoscopy or further workup.  All questions answered, patient understands and agrees with plan.  Patient to be discharged in stable condition.    Impression:  The encounter diagnosis was Abdominal pain, acute, right lower quadrant.    Follow Up:  Dannie Patton MD  [address] [address]  [phone]    Call   Call to schedule follow-up with the GI doctor as soon as possible     Pt is discharged in good condition.    Patient informed to return to the emergency room department immediately if symptoms worsen, persist, concerns, new symptoms, or followup cannot be  obtained.         Delisa Pineda PA-C  10/01/24 8194

## 2024-11-01 LAB
BUN BLDV-MCNC: 22 MG/DL
CALCIUM SERPL-MCNC: 9.8 MG/DL
CHLORIDE BLD-SCNC: 106 MMOL/L
CHOLESTEROL, TOTAL: 185 MG/DL
CHOLESTEROL/HDL RATIO: 3.7
CO2: 24 MMOL/L
CREAT SERPL-MCNC: 0.95 MG/DL
EGFR: NORMAL
ESTIMATED AVERAGE GLUCOSE: 97
GLUCOSE BLD-MCNC: 97 MG/DL
HBA1C MFR BLD: 6.1 %
HDLC SERPL-MCNC: 50 MG/DL (ref 35–70)
LDL CHOLESTEROL: 123
NONHDLC SERPL-MCNC: NORMAL MG/DL
POTASSIUM SERPL-SCNC: 4.8 MMOL/L
PROSTATE SPECIFIC ANTIGEN: 2.9 NG/ML
SODIUM BLD-SCNC: 143 MMOL/L
TRIGL SERPL-MCNC: 62 MG/DL
VLDLC SERPL CALC-MCNC: 12 MG/DL

## 2024-11-26 ENCOUNTER — TELEPHONE (OUTPATIENT)
Dept: FAMILY MEDICINE CLINIC | Age: 64
End: 2024-11-26

## 2024-11-27 NOTE — RESULT ENCOUNTER NOTE
Your labs overall good, average sugar is little higher than your previous, Your are prediabetic. Keep working on your diet and exercise and we will monitor every 6 months

## 2025-01-28 ENCOUNTER — HOSPITAL ENCOUNTER (OUTPATIENT)
Dept: PHYSICAL THERAPY | Age: 65
Setting detail: THERAPIES SERIES
Discharge: HOME | End: 2025-01-28
Payer: COMMERCIAL

## 2025-01-28 PROCEDURE — 97161 PT EVAL LOW COMPLEX 20 MIN: CPT

## 2025-01-28 ASSESSMENT — PAIN SCALES - GENERAL: PAINLEVEL_OUTOF10: 1

## 2025-01-28 NOTE — THERAPY EVALUATION
Phone: [phone]                       [facility]         Fax: [phone]                      Outpatient Physical Therapy                                                                      Evaluation    Date: 2025  Patient: Anthony Malave  : 1960  ACCT #: [id number]    Referring Provider: Dr. Barry    Diagnosis: Bilateral shoulder pain      Treatment Diagnosis: Bilateral shoulder pain/left shoulder weakness  Onset Date: 25  PT Insurance Information: [employer]  Total # of Visits Approved: 20 Per Insurance  Total # of Visits to Date: 1  No Show: 0  Canceled Appointment: 0     Subjective     Additional Pertinent Hx: Chronic bilateral shoulder pain with recent exacerbation past 2 months; left worse than right.  Noting limited ROM which affects his ability to complete self-care tasks;  notes radicular pain interemittently extending to first 3 digits.  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  Tentatively scheduled for left TSR 3/25/25.  UEFS=40/80  PMHx includes THR/TKR  Pain Assessment  Pain Level: 1     IADL History  Active : Yes  Occupation: Retired  Leisure & Hobbies: Normallly active with daily household and outdoor activities but is limited with reaching or lifting above shoulder height due to pain and weakness of B shoulders    Objective  Vision  Vision: Within Functional Limits  Observation/Palpation  Posture: Fair (forward head/shoulders)  Palpation: Moderate tenderness surrounding left shoulder jt and deltoid     Strength RUE  Comment: Generally 4+/5 flexion/abduction @ 90 deg with minimal discomfort;  ER/IR 4+/5 at neutral  Strength LUE  Comment: Generally 4/5 flexion and abduction @ 90 deg testing;   3+/5 at neutral for IR/ER.   Moderate pain with resisted testing of abduction    AROM RUE (degrees)  RUE AROM : Exceptions  R Shoulder Flexion (0-180): 100 deg  R Shoulder ABduction (0-180): 90 deg  R Shoulder Int Rotation  (0-70): Functionally able

## 2025-01-28 NOTE — PLAN OF CARE
[facility]       Phone: [phone]   Date: 2025                      Outpatient Physical Therapy  Fax: [phone]    ACCT #: [id number]                     Plan of Care  [facility]#: [id number]  Patient: Anthony Malave  : 1960    Referring Provider: Dr. Barry      Diagnosis: Bilateral shoulder pain  Onset Date: 25    Treatment Diagnosis: Bilateral shoulder pain/left shoulder weakness    Assessment  Assessment: Patient presents with bilateral shoulder pain with limited ROM and strength affecting ability to complete self-care tasks or daily household activities.   Suggestive of rotator cuff involvement with probably tear along with moderate DJD/crepitus.   Plan to focus on ROM and strengthening with emphasis on Left UE due to advanced involvement along with pain.   UEFS = 40/80 indicating moderate loss of function.  Therapy Prognosis: Fair    Treatment Plan :  Days: 2 (1-2 days/week to upgrade HEP and complete PROM) times per week Weeks: 8 weeks Total # of Visits Approved: 20    Patient Education/HEP, Therapeutic Exercise, and Therapeutic Activity     Goals  Time Frame for Short Term Goals: 3 visits  Short Term Goal 1: Educate on home program of shoulder ROM and strengthening to improve stability, functional mobility and provide pain reduction    Time Frame for Long Term Goals : 10 visits  Long Term Goal 1: Subjective left shoulder pain rated < 4/10 with daily self-care tasks, sleeping, and reaching activities  Long Term Goal 2: PROM left shoulder flexion 130 deg to improve functional reaching for self-care tasks  Long Term Goal 3: Functional AROM left shoulder to reach overhead to comb/wash hair with pain and reach behind back to tuck in shirt and complete ADL's  Long Term Goal 4: Strength left shoulder flexion to lift light objects (5-10#) into upper cabinets without discomfort or dropping objects     SOFIE LEWIS, PT   Date: 2025    ______________________________________ Date:

## 2025-02-04 ENCOUNTER — HOSPITAL ENCOUNTER (OUTPATIENT)
Dept: PHYSICAL THERAPY | Age: 65
Setting detail: THERAPIES SERIES
Discharge: HOME | End: 2025-02-04
Payer: COMMERCIAL

## 2025-02-04 PROCEDURE — 97110 THERAPEUTIC EXERCISES: CPT

## 2025-02-04 NOTE — PROGRESS NOTES
Phone: [phone]                 [facility]      Fax: [phone]                            Outpatient Physical Therapy                                                                            Daily Note    Date: 2025  Patient Name: Anthony Malave        MRN: [id number]   ACCT#:  [id number]  : 1960  (64 y.o.)    Referring Provider (secondary): Dr. Barry         Diagnosis: Bilateral shoulder pain  Treatment Diagnosis: Bilateral shoulder pain/left shoulder weakness    Onset Date: 25  PT Insurance Information: [employer]  Total # of Visits Approved: 20 Per Physician Order  Total # of Visits to Date: 2  No Show: 0  Canceled Appointment: 0  Plan of Care/Certification Expiration Date: 25    Pre-Treatment Pain:  4/10     Assessment  Assessment: Patient compliant with home program and able to correctly perform upon review.  Added flex and abduction strengthening to 90 deg and bicep/elbow flexion with weight to HEP.  Patient able to actively elevate to 90 deg before compensation and pain.  Signficant crepitus noted with active abduction.  PROM limited to 100 deg with pain/crepitus and tightness.   Progress with postural strengthening but functional use and mobility will be limited due to advanced DJD affecting the left shoulder    Plan  Continue with current plan of care    Exercises/Modalities/Manual:  See DocFlow Sheet    Education: Educated on HEP for active flex and abduction to 90 deg with weights and bicep curls with weight            Goals  (Total # of Visits to Date: 2)   Short Term Goals  Time Frame for Short Term Goals: 3 visits  Short Term Goal 1: Educate on home program of shoulder ROM and strengthening to improve stability and pain reduction    Long Term Goals  Time Frame for Long Term Goals : 10 visits  Long Term Goal 1: Subjective left shoulder pain rated < 4/10 with daily self-care tasks, sleeping, and reaching activities  Long Term Goal 2: PROM left shoulder flexion 130

## 2025-02-11 ENCOUNTER — HOSPITAL ENCOUNTER (OUTPATIENT)
Dept: PHYSICAL THERAPY | Age: 65
Setting detail: THERAPIES SERIES
Discharge: HOME | End: 2025-02-11
Payer: COMMERCIAL

## 2025-02-11 PROCEDURE — 97110 THERAPEUTIC EXERCISES: CPT

## 2025-02-11 ASSESSMENT — PAIN SCALES - GENERAL: PAINLEVEL_OUTOF10: 3

## 2025-02-11 NOTE — PROGRESS NOTES
Phone: [phone]                 [facility]      Fax: [phone]                            Outpatient Physical Therapy                                                                            Daily Note    Date: 2025  Patient Name: Anthony Malave        MRN: [id number]   ACCT#:  [id number]  : 1960  (64 y.o.)    Referring Provider (secondary): Dr. Barry         Diagnosis: Bilateral shoulder pain  Treatment Diagnosis: Bilateral shoulder pain/left shoulder weakness    Onset Date: 25  PT Insurance Information: [employer]  Total # of Visits Approved: 20 Per Physician Order  Total # of Visits to Date: 3  No Show: 0  Canceled Appointment: 0  Plan of Care/Certification Expiration Date: 25    Pre-Treatment Pain:  3-5/10     Assessment  Assessment: Patient has been compliant with completing his home exercises and is able to correctly perform.  Discussed increasing reps prior to increasing weights.   Patient noting more left shoulder pain when attempting to increase exercises or activity level ;   PROM flexion 110 deg flexion;  limited ER.  Signficant shoulder and scapular crepitus noted with movement.  Educated on sidelying ER.  Plan to continue x 1 visit and then refer back to orthopedic MD for follow-up if no signficant change.    Plan  Continue with current plan of care    Exercises/Modalities/Manual:  See DocFlow Sheet    Education: Sidelying ER            Goals  (Total # of Visits to Date: 3)   Short Term Goals  Time Frame for Short Term Goals: 3 visits  Short Term Goal 1: Educate on home program of shoulder ROM and strengthening to improve stability and pain reduction- MET    Long Term Goals  Time Frame for Long Term Goals : 10 visits  Long Term Goal 1: Subjective left shoulder pain rated < 4/10 with daily self-care tasks, sleeping, and reaching activities  Long Term Goal 2: PROM left shoulder flexion 130 deg to improve functional reaching for self-care tasks  Long Term Goal 3:

## 2025-02-18 ENCOUNTER — HOSPITAL ENCOUNTER (OUTPATIENT)
Dept: PHYSICAL THERAPY | Age: 65
Setting detail: THERAPIES SERIES
Discharge: HOME | End: 2025-02-18
Payer: COMMERCIAL

## 2025-02-18 PROCEDURE — 97110 THERAPEUTIC EXERCISES: CPT

## 2025-02-18 ASSESSMENT — PAIN SCALES - GENERAL: PAINLEVEL_OUTOF10: 4

## 2025-02-19 NOTE — PROGRESS NOTES
Phone: [phone]                 [facility]      Fax: [phone]                            Outpatient Physical Therapy                                                                            Daily Note    Date: 2025  Patient Name: Anthony Malave        MRN: [id number]   ACCT#:  [id number]  : 1960  (64 y.o.)    Referring Provider : Dr. Barry           Diagnosis: Bilateral shoulder pain  Treatment Diagnosis: Bilateral shoulder pain/left shoulder weakness    Onset Date: 25  PT Insurance Information: [employer]  Total # of Visits Approved: 20 Per Physician Order  Total # of Visits to Date: 4  No Show: 0  Canceled Appointment: 0  Plan of Care/Certification Expiration Date: 25    Pre-Treatment Pain:  10     Assessment  Assessment: Patient presents with bilateral shoulder pain with the left shoulder more involved than the right.   He has completed one month of physical therapy consisting of ROM and strengthening ex as well as education on a home program with the patient demonstrating compliance.  His subjective left shoulder pain is rated 4/10 or higher depending on activity level.  He is limited with self -care tasks such as reaching behind head or back as well as lifting or reaching above shoulder height.  PROM is limited to 120 deg flexion and 90 deg abduction which has not improved since beginning therapy.  There is signficant crepitus with ROM.   Patient is compliant with his home program.  Overall, there has been no signficant functional improvement in mobility or with pain reduction since initiating therapy.   Based on present status, will refer back to ortho MD for follow up and feel patient would benefit from surgical intervention/probable TSR.    Plan  Hold pending MD assessment    Exercises/Modalities/Manual:  See DocFlow Sheet    Education: Patient to contact ortho MD for follow-up; plan to hold/discharge PT            Goals  (Total # of Visits to Date: 4)   Short Term

## 2025-02-19 NOTE — PROGRESS NOTES
Phone: [phone]                  [facility]            Fax: [phone]                             Outpatient Physical Therapy                                                                      Progress Report    Date: 2025   MRN: [id number]    ACCT#: [id number]  Patient: Anthony Malave  : 1960  Referring Provider: Dr. Barry           Diagnosis: Bilateral shoulder pain      Treatment Diagnosis: Bilateral shoulder pain/left shoulder weakness    Onset Date: 25  PT Insurance Information: [employer]  Total # of Visits Approved: 20 Per Physician Order  Total # of Visits to Date: 4  No Show: 0  Canceled Appointment: 0    Assessment: Patient presents with bilateral shoulder pain with the left shoulder more involved than the right.   He has completed one month of physical therapy consisting of ROM and strengthening ex as well as education on a home program with the patient demonstrating compliance.  His subjective left shoulder pain is rated 4/10 or danni depending on activity level.  He is limited with self -care tasks such as reaching behind head or back as well as lifting or reaching above shoulder height.  PROM is limited to 120 deg flexion and 90 deg abduction which has not improved since beginning therapy.  There is signficant crepitus with ROM.   Patient is compliant with his home program.  Overall, there has been no signficant functional improvement in mobility or with pain reduction with therapy.   Based on present status, will refer back to ortho MD for follow up and feel patient would benefit from surgical intervention/probable TSR.    Therapy Prognosis: Fair    Plan:  Hold pending MD assessment/Discharge    Goals  Short Term Goals  Time Frame for Short Term Goals: 3 visits  Short Term Goal 1: Educate on home program of shoulder ROM and strengthening to improve stability and pain reduction- MET    Long Term Goals  Time Frame for Long Term Goals : 10 visits  Long Term Goal 1:

## 2025-02-25 RX ORDER — HYDROCHLOROTHIAZIDE 25 MG/1
25 TABLET ORAL DAILY
Qty: 90 TABLET | Refills: 1 | Status: SHIPPED | OUTPATIENT
Start: 2025-02-25

## 2025-02-25 RX ORDER — LOSARTAN POTASSIUM 100 MG/1
100 TABLET ORAL DAILY
Qty: 90 TABLET | Refills: 1 | Status: SHIPPED | OUTPATIENT
Start: 2025-02-25

## 2025-03-03 ENCOUNTER — OFFICE VISIT (OUTPATIENT)
Dept: FAMILY MEDICINE CLINIC | Age: 65
End: 2025-03-03
Payer: COMMERCIAL

## 2025-03-03 VITALS
SYSTOLIC BLOOD PRESSURE: 168 MMHG | OXYGEN SATURATION: 98 % | WEIGHT: 239 LBS | BODY MASS INDEX: 34.22 KG/M2 | HEART RATE: 71 BPM | DIASTOLIC BLOOD PRESSURE: 100 MMHG | HEIGHT: 70 IN

## 2025-03-03 DIAGNOSIS — I10 ESSENTIAL HYPERTENSION: Primary | ICD-10-CM

## 2025-03-03 DIAGNOSIS — M25.512 CHRONIC LEFT SHOULDER PAIN: ICD-10-CM

## 2025-03-03 DIAGNOSIS — G89.29 CHRONIC LEFT SHOULDER PAIN: ICD-10-CM

## 2025-03-03 PROCEDURE — 3077F SYST BP >= 140 MM HG: CPT | Performed by: FAMILY MEDICINE

## 2025-03-03 PROCEDURE — 99213 OFFICE O/P EST LOW 20 MIN: CPT | Performed by: FAMILY MEDICINE

## 2025-03-03 PROCEDURE — 3080F DIAST BP >= 90 MM HG: CPT | Performed by: FAMILY MEDICINE

## 2025-03-03 RX ORDER — METOPROLOL SUCCINATE 25 MG/1
25 TABLET, EXTENDED RELEASE ORAL DAILY
Qty: 90 TABLET | Refills: 1 | Status: SHIPPED | OUTPATIENT
Start: 2025-03-03

## 2025-03-03 RX ORDER — DOXAZOSIN 4 MG/1
4 TABLET ORAL NIGHTLY
Qty: 90 TABLET | Refills: 1 | Status: SHIPPED | OUTPATIENT
Start: 2025-03-03

## 2025-03-03 SDOH — ECONOMIC STABILITY: FOOD INSECURITY: WITHIN THE PAST 12 MONTHS, YOU WORRIED THAT YOUR FOOD WOULD RUN OUT BEFORE YOU GOT MONEY TO BUY MORE.: NEVER TRUE

## 2025-03-03 SDOH — ECONOMIC STABILITY: FOOD INSECURITY: WITHIN THE PAST 12 MONTHS, THE FOOD YOU BOUGHT JUST DIDN'T LAST AND YOU DIDN'T HAVE MONEY TO GET MORE.: NEVER TRUE

## 2025-03-03 ASSESSMENT — PATIENT HEALTH QUESTIONNAIRE - PHQ9
2. FEELING DOWN, DEPRESSED OR HOPELESS: NOT AT ALL
SUM OF ALL RESPONSES TO PHQ QUESTIONS 1-9: 0
1. LITTLE INTEREST OR PLEASURE IN DOING THINGS: NOT AT ALL
SUM OF ALL RESPONSES TO PHQ QUESTIONS 1-9: 0

## 2025-03-03 NOTE — PROGRESS NOTES
Name: Anthony Malave  : 1960         Chief Complaint:     Chief Complaint   Patient presents with    Hypertension    Hyperlipidemia    Gastroesophageal Reflux    Arthritis     Having left shoulder replacement       History of Present Illness:      Anthony Malave is a 64 y.o.  male who presents with Hypertension, Hyperlipidemia, Gastroesophageal Reflux, and Arthritis (Having left shoulder replacement)      HPI    Upcoming L shoulder replacement by Dr. Barry in [place], will have CT soon to determine whether conventional or reverse. Lots of pain in the shoulder even going up to neck. Also new onset also of numbness L first 3 fingers if he rests forearm on couch arm. Has been on meloxicam but won't be staying on it - postop will be on celebrex for 6 mos instead. Hoping after negro shoulder surgeries he can get off nsaid's altogether.    Chronic nasal congestion has stayed pretty well controlled, fought off illnesses a couple times with zinc, vit D, flonase, nasal saline aerosolized spray which he'll use after working in [employer] or mowing grass.    HTN - hasn't been checking BP at home in past few mos. In Nov had been great at biometric screening. Nothing mentioned about it at ortho visits. Taking all rx as directed.    Medical History:     Patient Active Problem List   Diagnosis    HTN (hypertension)    GERD (gastroesophageal reflux disease)    Pure hypercholesterolemia    Arthritis    Environmental and seasonal allergies    Essential tremor       Medications:       Prior to Admission medications    Medication Sig Start Date End Date Taking? Authorizing Provider   metoprolol succinate (TOPROL XL) 25 MG extended release tablet Take 1 tablet by mouth daily 3/3/25  Yes Mi Yoder DO   doxazosin (CARDURA) 4 MG tablet Take 1 tablet by mouth nightly 3/3/25  Yes Mi Yoder DO   hydroCHLOROthiazide (HYDRODIURIL) 25 MG tablet TAKE 1 TABLET BY MOUTH DAILY 25  Yes Mi Yoder DO   losartan

## 2025-03-10 ENCOUNTER — NURSE ONLY (OUTPATIENT)
Dept: FAMILY MEDICINE CLINIC | Age: 65
End: 2025-03-10

## 2025-03-10 VITALS — SYSTOLIC BLOOD PRESSURE: 152 MMHG | HEART RATE: 84 BPM | DIASTOLIC BLOOD PRESSURE: 90 MMHG | OXYGEN SATURATION: 96 %

## 2025-03-10 DIAGNOSIS — I10 ESSENTIAL HYPERTENSION: Primary | ICD-10-CM

## 2025-03-25 RX ORDER — MELOXICAM 15 MG/1
15 TABLET ORAL DAILY
Qty: 90 TABLET | Refills: 1 | Status: SHIPPED | OUTPATIENT
Start: 2025-03-25

## 2025-04-04 ENCOUNTER — HOSPITAL ENCOUNTER (OUTPATIENT)
Dept: PHYSICAL THERAPY | Age: 65
Setting detail: THERAPIES SERIES
Discharge: HOME | End: 2025-04-04
Payer: COMMERCIAL

## 2025-04-04 PROCEDURE — 97161 PT EVAL LOW COMPLEX 20 MIN: CPT

## 2025-04-04 ASSESSMENT — PAIN SCALES - GENERAL: PAINLEVEL_OUTOF10: 0

## 2025-04-04 NOTE — PLAN OF CARE
[facility]       Phone: [phone]   Date: 2025                      Outpatient Physical Therapy  Fax: [phone]    ACCT #: [id number]                     Plan of Care  [facility]#: [id number]  Patient: Anthony Malave  : 1960    Referring Provider (secondary): Shankar Barry    Diagnosis: L reverse TSA  Onset Date: 25  Treatment Diagnosis: L shoulder impaired ROM and weakness    Assessment  Body Structures, Functions, Activity Limitations Requiring Skilled Therapeutic Intervention: Decreased functional mobility , Decreased ADL status, Decreased ROM, Decreased strength, Increased pain  Assessment: Pt presents 3 days post-op L reverse TSA. Pt demonstrates limitations in PROM in all planes. Due to limited therapy visits on insurance, pt was given HEP and will come 1x per week to progress HEP. Pt reports he will be compliant with HEP. Was also educated on safe amounts of movement of the LUE when bathing/dressing, pillow under the L eldow/shoulder to prevent shoulder extension, and HEP.  Therapy Prognosis: Good    Treatment Plan   Days: 2 (1-2x per week for seveal weeks to update HEP due to limited therapy visits on insurance.) times per week Weeks: 10 (up to 12 weeks due to 1-2x per week) weeks Total # of Visits Approved: 16    Patient Education/HEP, Therapeutic Exercise, Manual Therapy: Myofacial Release/Cupping, Manual Therapy: Mobilization/Manipulation, Neuro Re-ed, HP/CP, Electrical Stimulation, and Therapeutic Activity     Goals  Short Term Goals  Time Frame for Short Term Goals: 10 visits  Short Term Goal 1: Pt will demonstrate understanding and compliance w HEP to facilitate recovery.  Short Term Goal 2: Pt will achieve full PROM of the L shoulder to facilitate return to ADLs.  Short Term Goal 3: Pt will achieve 140 deg AROM of L shoulder flexion and abduction to facilitate grooming and bathing.  Long Term Goals  Time Frame for Long Term Goals : 16 Visits  Long Term Goal 1: Pt will

## 2025-04-04 NOTE — THERAPY EVALUATION
Phone: [phone]                       [facility]         Fax: [phone]                      Outpatient Physical Therapy                                                                      Evaluation    Date: 2025  Patient: Anthony Malave  : 1960  ACCT #: [id number]    Referring Provider (secondary): Shankar Barry    Diagnosis: L reverse TSA    Treatment Diagnosis: L shoulder impaired ROM and weakness  Onset Date: 25  PT Insurance Information: [employer]  Total # of Visits Approved: 16 Per Physician Order  Total # of Visits to Date: 1  No Show: 0  Canceled Appointment: 0     Subjective     Additional Pertinent Hx: Pt had L reverse TSA on 25. Pt reports no complications. Pt reports he has fluctuating pain but the pain meds keep it under control. Ices L shoulder every hour. Sleeping up in a recliner. Pt has a follow appt on 25.  Pain Assessment  Pain Level: 0          Objective                      PROM LUE (degrees)  L Shoulder Flex  (0-180): 63 deg  L Shoulder ABduction (0-180): 68 deg  L Shoulder Int Rotation  (0-70): 15 deg (to abdomen)  L Shoulder Ext Rotation  (0-90): 10 deg                                     Assessment  Body Structures, Functions, Activity Limitations Requiring Skilled Therapeutic Intervention: Decreased functional mobility , Decreased ADL status, Decreased ROM, Decreased strength, Increased pain  Assessment: Pt presents 3 days post-op L reverse TSA. Pt demonstrates limitations in PROM in all planes. Due to limited therapy visits on insurance, pt was given HEP and will come 1x per week to progress HEP. Pt reports he will be compliant with HEP. Was also educated on safe amounts of movement of the LUE when bathing/dressing, pillow under the L eldow/shoulder to prevent shoulder extension, and HEP.  Therapy Prognosis: Good    Clinical Presentation:  Stable/Uncomplicated  The Following Comorbities will impact the patient’s progression and Plan of Care:

## 2025-04-09 ENCOUNTER — HOSPITAL ENCOUNTER (OUTPATIENT)
Dept: PHYSICAL THERAPY | Age: 65
Setting detail: THERAPIES SERIES
Discharge: HOME | End: 2025-04-09
Payer: COMMERCIAL

## 2025-04-09 PROCEDURE — 97110 THERAPEUTIC EXERCISES: CPT

## 2025-04-09 ASSESSMENT — PAIN SCALES - GENERAL: PAINLEVEL_OUTOF10: 1

## 2025-04-09 NOTE — PROGRESS NOTES
Phone: [phone]                 [facility]      Fax: [phone]                            Outpatient Physical Therapy                                                                            Daily Note    Date: 2025  Patient Name: Anthony Malave        MRN: [id number]   ACCT#:  [id number]  : 1960  (65 y.o.)    Referring Provider (secondary): Shankar Barry         Diagnosis: L reverse TSA  Treatment Diagnosis: L shoulder impaired ROM and weakness    Onset Date: 25  PT Insurance Information: [employer]  Total # of Visits Approved: 16 Per Physician Order  Total # of Visits to Date: 2  No Show: 0  Canceled Appointment: 0  Plan of Care/Certification Expiration Date: 25    Pre-Treatment Pain:  0/10     Assessment  Assessment: 1120 Pt is 1 week out from L reverse TSA. Reports still lots of swelling and bruising, but pain is less. States he can take off the sling and leave his arm dependenty at his side for longer periods of time. Pt has large yellow bruise in the center of his chest. Pt meets w doc next week for 2 week check up.    Plan  Continue with current plan of care    Exercises/Modalities/Manual:  See DocFlow Sheet    Education: Educated on POC.            Goals  (Total # of Visits to Date: 2)   Short Term Goals  Time Frame for Short Term Goals: 10 visits  Short Term Goal 1: Pt will demonstrate understanding and compliance w HEP to facilitate recovery.  Short Term Goal 2: Pt will achieve full PROM of the L shoulder to facilitate return to ADLs.  Short Term Goal 3: Pt will achieve 140 deg AROM of L shoulder flexion and abduction to facilitate grooming and bathing.    Long Term Goals  Time Frame for Long Term Goals : 16 Visits  Long Term Goal 1: Pt will demonstrate L shoulder strength 4/5 in all muscle groups to facilitate return to ADLs.  Long Term Goal 2: Pt will demontrate ability to lift 5 lbs over the head 5 times to be able to put items into/take them out of the

## 2025-04-16 ENCOUNTER — HOSPITAL ENCOUNTER (OUTPATIENT)
Dept: PHYSICAL THERAPY | Age: 65
Setting detail: THERAPIES SERIES
Discharge: HOME | End: 2025-04-16
Payer: COMMERCIAL

## 2025-04-16 PROCEDURE — 97110 THERAPEUTIC EXERCISES: CPT

## 2025-04-16 ASSESSMENT — PAIN SCALES - GENERAL: PAINLEVEL_OUTOF10: 1

## 2025-04-16 NOTE — PROGRESS NOTES
Phone: [phone]                 [facility]      Fax: [phone]                            Outpatient Physical Therapy                                                                            Daily Note    Date: 2025  Patient Name: Anthony Malave        MRN: [id number]   ACCT#:  [id number]  : 1960  (65 y.o.)    Referring Provider (secondary): Shankar Barry         Diagnosis: L reverse TSA  Treatment Diagnosis: L shoulder impaired ROM and weakness    Onset Date: 25  PT Insurance Information: [employer]  Total # of Visits Approved: 16 Per Physician Order  Total # of Visits to Date: 3  No Show: 0.01  Canceled Appointment: 0  Plan of Care/Certification Expiration Date: 25    Pre-Treatment Pain:  1/10     Assessment  Assessment: Pt saw  who advised pt only needs sling at night and prn throughout the day. Next Dr appt 25. No pain meds yesterday.Pain 1/10. Pt drove this morning to therapy. Performed ex as outlined per protocol.  Pain after session remained 1/10. Scheduled 1x next week.    Plan  Continue with current plan of care    Exercises/Modalities/Manual:  See DocFlow Sheet    Education: ice after session            Goals  (Total # of Visits to Date: 3)   Short Term Goals  Time Frame for Short Term Goals: 10 visits  Short Term Goal 1: Pt will demonstrate understanding and compliance w HEP to facilitate recovery.  Short Term Goal 2: Pt will achieve full PROM of the L shoulder to facilitate return to ADLs.  Short Term Goal 3: Pt will achieve 140 deg AROM of L shoulder flexion and abduction to facilitate grooming and bathing.    Long Term Goals  Time Frame for Long Term Goals : 16 Visits  Long Term Goal 1: Pt will demonstrate L shoulder strength 4/5 in all muscle groups to facilitate return to ADLs.  Long Term Goal 2: Pt will demontrate ability to lift 5 lbs over the head 5 times to be able to put items into/take them out of the cupboard.  Long Term Goal 3: Pt will achieve 60

## 2025-04-23 ENCOUNTER — HOSPITAL ENCOUNTER (OUTPATIENT)
Dept: PHYSICAL THERAPY | Age: 65
Setting detail: THERAPIES SERIES
Discharge: HOME | End: 2025-04-23
Payer: COMMERCIAL

## 2025-04-23 PROCEDURE — 97110 THERAPEUTIC EXERCISES: CPT

## 2025-04-23 ASSESSMENT — PAIN SCALES - GENERAL: PAINLEVEL_OUTOF10: 1

## 2025-04-23 NOTE — PROGRESS NOTES
Phone: [phone]                 [facility]      Fax: [phone]                            Outpatient Physical Therapy                                                                            Daily Note    Date: 2025  Patient Name: Anthony Malave        MRN: [id number]   ACCT#:  [id number]  : 1960  (65 y.o.)    Referring Provider (secondary): Shankar Barry         Diagnosis: L reverse TSA  Treatment Diagnosis: L shoulder impaired ROM and weakness    Onset Date: 25  PT Insurance Information: [employer]  Total # of Visits Approved: 16 Per Physician Order  Total # of Visits to Date: 4  No Show: 0.01  Canceled Appointment: 0  Plan of Care/Certification Expiration Date: 25    Pre-Treatment Pain:  1/10     Assessment  Assessment: 0802 - 08Pt reports he saw the  who said all joint replacement parts are in place. Told pt he could take off the sling. 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 Pt continues to report swelling in the elbow up arm into the shoulder. Completed isometrics and AAROM w session w pt reporting understanding w addition to HEP. 2/10 after session.    Plan  Continue with current plan of care    Exercises/Modalities/Manual:  See DocFlow Sheet    Education: Educated on addition to HEP            Goals  (Total # of Visits to Date: 4)   Short Term Goals  Time Frame for Short Term Goals: 10 visits  Short Term Goal 1: Pt will demonstrate understanding and compliance w HEP to facilitate recovery.  Short Term Goal 2: Pt will achieve full PROM of the L shoulder to facilitate return to ADLs.  Short Term Goal 3: Pt will achieve 140 deg AROM of L shoulder flexion and abduction to facilitate grooming and bathing.    Long Term Goals  Time Frame for Long Term Goals : 16 Visits  Long Term Goal 1: Pt will demonstrate L shoulder strength 4/5 in all muscle groups to facilitate return to ADLs.  Long Term Goal 2: Pt will demontrate ability to lift 5 lbs

## 2025-04-30 ENCOUNTER — HOSPITAL ENCOUNTER (OUTPATIENT)
Dept: PHYSICAL THERAPY | Age: 65
Setting detail: THERAPIES SERIES
Discharge: HOME | End: 2025-04-30
Payer: COMMERCIAL

## 2025-04-30 PROCEDURE — 97110 THERAPEUTIC EXERCISES: CPT

## 2025-04-30 NOTE — PROGRESS NOTES
Phone: [phone]                 [facility]      Fax: [phone]                            Outpatient Physical Therapy                                                                            Daily Note    Date: 2025  Patient Name: Anthony Malave        MRN: [id number]   ACCT#:  [id number]  : 1960  (65 y.o.)    Referring Provider (secondary): Shankar Barry         Diagnosis: L reverse TSA  Treatment Diagnosis: L shoulder impaired ROM and weakness    Onset Date: 25  PT Insurance Information: [employer]  Total # of Visits Approved: 16 Per Physician Order  Total # of Visits to Date: 5  No Show: 0.01  Canceled Appointment: 0  Plan of Care/Certification Expiration Date: 25    Pre-Treatment Pain:  0/10     Assessment  Assessment: 0800 Pt denies pain this morning. Reports he is able to do more with his arm at home. Continues to wear the sling at night per Dr. mcmillan. Progressed ther ex as tolerated. Plan to move into full AROM and PROM next session. Additions to HEP.    Plan  Continue with current plan of care    Exercises/Modalities/Manual:  See DocFlow Sheet    Education: Educated on additional HEP            Goals  (Total # of Visits to Date: 5)   Short Term Goals  Time Frame for Short Term Goals: 10 visits  Short Term Goal 1: Pt will demonstrate understanding and compliance w HEP to facilitate recovery. -MET  Short Term Goal 2: Pt will achieve full PROM of the L shoulder to facilitate return to ADLs.  Short Term Goal 3: Pt will achieve 140 deg AROM of L shoulder flexion and abduction to facilitate grooming and bathing.    Long Term Goals  Time Frame for Long Term Goals : 16 Visits  Long Term Goal 1: Pt will demonstrate L shoulder strength 4/5 in all muscle groups to facilitate return to ADLs.  Long Term Goal 2: Pt will demontrate ability to lift 5 lbs over the head 5 times to be able to put items into/take them out of the cupboard.  Long Term Goal 3: Pt will achieve 60 deg of IR/ER

## 2025-05-07 ENCOUNTER — HOSPITAL ENCOUNTER (OUTPATIENT)
Dept: PHYSICAL THERAPY | Age: 65
Setting detail: THERAPIES SERIES
Discharge: HOME | End: 2025-05-07
Payer: COMMERCIAL

## 2025-05-07 PROCEDURE — 97110 THERAPEUTIC EXERCISES: CPT

## 2025-05-07 NOTE — PROGRESS NOTES
Phone: [phone]                 [facility]      Fax: [phone]                            Outpatient Physical Therapy                                                                            Daily Note    Date: 2025  Patient Name: Anthony Malave        MRN: [id number]   ACCT#:  [id number]  : 1960  (65 y.o.)    Referring Provider (secondary): Shankar Barry         Diagnosis: L reverse TSA  Treatment Diagnosis: L shoulder impaired ROM and weakness    Onset Date: 25  PT Insurance Information: [employer]  Total # of Visits Approved: 16 Per Physician Order  Total # of Visits to Date: 6  No Show: 0.01  Canceled Appointment: 0  Plan of Care/Certification Expiration Date: 25    Pre-Treatment Pain:  0/10     Assessment  Assessment: 0800 Pt denies pain this session. Reports he had a setback over the weekend while he was trying put his arm through his shirt. He reports he had burning pain near the incision, but that it felt more superficial. Pt progressed this session as tolerated w good toelrance, and updated on HEP.    Plan  Continue with current plan of care    Exercises/Modalities/Manual:  See DocFlow Sheet    Education: Educated on HEP            Goals  (Total # of Visits to Date: 6)   Short Term Goals  Time Frame for Short Term Goals: 10 visits  Short Term Goal 1: Pt will demonstrate understanding and compliance w HEP to facilitate recovery. -MET  Short Term Goal 2: Pt will achieve full PROM of the L shoulder to facilitate return to ADLs.  Short Term Goal 3: Pt will achieve 140 deg AROM of L shoulder flexion and abduction to facilitate grooming and bathing.    Long Term Goals  Time Frame for Long Term Goals : 16 Visits  Long Term Goal 1: Pt will demonstrate L shoulder strength 4/5 in all muscle groups to facilitate return to ADLs.  Long Term Goal 2: Pt will demontrate ability to lift 5 lbs over the head 5 times to be able to put items into/take them out of the cupboard.  Long Term

## 2025-05-07 NOTE — PROGRESS NOTES
Name: Jessica Wheeler  : 1960         Chief Complaint:     Chief Complaint   Patient presents with    Hypertension    Hyperlipidemia       History of Present Illness:      Jessica Wheeler is a 64 y.o.  male who presents with Hypertension and Hyperlipidemia      HPI     S/p negro TKA, last one the R 7 wks ago. Has had a lot more trouble with that one, still has swelling and some bruising in the R leg. Wearing knee-high compression stockings plus athletic-type compression stockings on negro knees. Has been on celebrex for 10 wks but doesn't feel like it's helping swelling. Hoping to decrease to prn dosing. Taking tramadol 1 pill on work days. No oxycodone for past 3 wks. Went back to work for half days for the past 2 wks and was back to full days this wk and has done ok, been elevating the foot. F/u HTN and hyperlipidemia. Has been doing fine, continues meds as directed without adverse effect. No chest pain or shortness of breath and is otherwise feeling well. Had blood work done prior to both of his knee replacements and was told there were no major issues. Allergic rhinitis has been well controlled, uses antihistamines and nasal sprays intermittently. No longer taking Singulair. No recent illness. Past Medical History:     Past Medical History:   Diagnosis Date    GERD (gastroesophageal reflux disease) 2013    HTN (hypertension) 2013    Hyperlipidemia 3/26/2014    Osteoarthritis     Dr Petit [facility] Pneumonia     Seasonal allergies     Toe fracture, left         Past Surgical History:     Past Surgical History:   Procedure Laterality Date    KNEE SURGERY Right         Medications:       Prior to Admission medications    Medication Sig Start Date End Date Taking?  Authorizing Provider   losartan (COZAAR) 100 MG tablet Take 1 tablet by mouth daily 3/13/20  Yes Elsi Larsen DO   hydroCHLOROthiazide (HYDRODIURIL) 25 MG tablet Take 1 tablet by mouth daily 3/13/20  Yes Elsi Larsen DO   Omega-3 Fatty Acids (FISH OIL) 1200 MG CPDR Take by mouth    Historical Provider, MD   GARLIC PO Take by mouth    Historical Provider, MD   celecoxib (CELEBREX) 200 MG capsule Take 200 mg by mouth 2 times daily    Historical Provider, MD   traMADol (ULTRAM) 50 MG tablet Take 50 mg by mouth every 6 hours as needed for Pain. 1 or 2    Historical Provider, MD   aspirin EC 81 MG EC tablet Take 1 tablet by mouth daily  Patient taking differently: Take 81 mg by mouth 2 times daily For 6 weeks post surgery 9/4/19   Lisette Falcon MD   Loratadine (CLARITIN PO) Take by mouth    Historical Provider, MD   montelukast (SINGULAIR) 10 MG tablet Take 1 tablet by mouth nightly  Patient not taking: Reported on 12/2/2019 2/6/19   Lisette Falcon MD   guaiFENesin (MUCINEX) 600 MG extended release tablet Take 2 tablets by mouth 2 times daily 1/18/19   Reinaldo Egan APRN - CNP   albuterol sulfate HFA (PROVENTIL HFA) 108 (90 Base) MCG/ACT inhaler Inhale 2 puffs into the lungs every 4 hours as needed for Wheezing (cough) 12/26/18   Lisette Falcon MD   fluticasone Valarie Buzzard ALLERGY RELIEF) 50 MCG/ACT nasal spray 1 spray by Nasal route daily 3/9/18   Joanna Maradiaga DO        Allergies:       Pneumococcal vaccines; Statins; and Tetracyclines & related    Social History:     Tobacco:    reports that he has never smoked. He quit smokeless tobacco use about 12 years ago. His smokeless tobacco use included snuff. Alcohol:      reports current alcohol use of about 4.0 standard drinks of alcohol per week. Drug Use:  reports no history of drug use. Family History:     Family History   Problem Relation Age of Onset    Arthritis Mother     Heart Attack Father        Review of Systems:     Positive and Negative as described in HPI    Review of Systems   Constitutional: Negative. Respiratory: Negative. Cardiovascular: Negative. Gastrointestinal: Negative. Genitourinary: Negative.         Physical Exam:     Vitals: /84   Pulse 89   Ht 5' 10\" (1.778 m)   Wt 256 lb (116.1 kg)   SpO2 99%   BMI 36.73 kg/m²   Physical Exam  Vitals signs and nursing note reviewed. Constitutional:       Appearance: He is well-developed. HENT:      Right Ear: Hearing and tympanic membrane normal.      Left Ear: Hearing and tympanic membrane normal.      Nose: Nose normal.      Mouth/Throat:      Dentition: Normal dentition. Eyes:      Conjunctiva/sclera: Conjunctivae normal.      Pupils: Pupils are equal, round, and reactive to light. Neck:      Thyroid: No thyroid mass or thyromegaly. Cardiovascular:      Rate and Rhythm: Normal rate and regular rhythm. Heart sounds: S1 normal and S2 normal. No murmur. Comments: No pitting edema in either leg  Pulmonary:      Effort: Pulmonary effort is normal.      Breath sounds: Normal breath sounds. Abdominal:      General: Bowel sounds are normal.      Palpations: Abdomen is soft. Tenderness: There is no abdominal tenderness. Musculoskeletal:      Comments: Moderate effusion bilateral knees. No pitting edema bilateral pretibial.  Wearing compression stockings and I did not remove those for exam.  Both knees have full active extension. Left knee approximately 120 degrees flexion and right knee approximately 100 degrees   Lymphadenopathy:      Cervical: No cervical adenopathy. Skin:     General: Skin is warm and dry. Findings: No rash. Neurological:      Mental Status: He is alert and oriented to person, place, and time. Psychiatric:         Mood and Affect: Mood normal.         Behavior: Behavior normal. Behavior is cooperative.          Judgment: Judgment normal.         Data:     Lab Results   Component Value Date     08/18/2017    K 4.3 08/18/2017     08/18/2017    CO2 22 08/18/2017    BUN 13 08/18/2017    CREATININE 0.91 08/18/2017    GLUCOSE 99 08/18/2017    PROT 6.7 11/23/2016    LABALBU 4.3 11/23/2016    BILITOT 0.39 11/23/2016    ALKPHOS 69 11/23/2016    AST 17 08/18/2017    ALT 27 08/18/2017     Lab Results   Component Value Date    WBC 6.4 08/18/2017    RBC 5.36 08/18/2017    HGB 15.8 08/18/2017    HCT 47.7 08/18/2017    MCV 89.2 08/18/2017    MCH 29.5 08/18/2017    MCHC 33.1 08/18/2017    RDW 13.2 08/18/2017     08/18/2017    MPV NOT REPORTED 12/13/2016     Lab Results   Component Value Date    TSH 1.65 08/18/2017     Lab Results   Component Value Date    CHOL 229 08/18/2017    HDL 42 08/18/2017    PSA 2.32 08/18/2017    LABA1C 5.8 08/31/2018         Assessment & Plan:        Diagnosis Orders   1. Essential hypertension     2. Pure hypercholesterolemia     3. Seasonal allergies     4. Bilateral knee swelling     1. Hypertension controlled, continue losartan HCTZ at current doses  2. Hyperlipidemia, does not tolerate statins. Advised to work more on diet and exercise. Update labs at upcoming health fair. 3.  Seasonal allergies controlled on current regimen  4. Bilateral knee swelling, much worse than the left, related to recent replacements. No concerning findings on exam, and patient has had Dopplers showing no clots. Advised I doubt NSAIDs will make much difference in the swelling and that mechanical measures like elevation and compression are his best options, continue home exercises and activity as tolerated. Requested Prescriptions     Signed Prescriptions Disp Refills    losartan (COZAAR) 100 MG tablet 90 tablet 1     Sig: Take 1 tablet by mouth daily    hydroCHLOROthiazide (HYDRODIURIL) 25 MG tablet 90 tablet 1     Sig: Take 1 tablet by mouth daily       Patient Instructions   SURVEY:    You may be receiving a survey from [employer] regarding your visit today. You may get this in the mail, through your MyChart or in your email. Please complete the survey to enable us to provide the highest quality of care to you and your family.     If you cannot score us as very good ( 5 Stars) on any question, please feel free to call the office to discuss how we could have made your experience exceptional.     Thank you. Clinical Care Team:  DO Andrew Dominguez, [facility] Team:  José Miugel Marx received counseling on the following healthy behaviors: medication adherence  Reviewed prior labs and health maintenance. Continue current medications, diet and exercise. Discussed use, benefit, and side effects of prescribed medications. Barriers to medication compliance addressed. Patient given educational materials - see patient instructions. All patient questions answered. Patient voiced understanding.      Electronically signed by Chris Malone DO on 3/16/2020 at 12:55 PM   [address]  Dept: [phone] Adequate (%)

## 2025-05-14 ENCOUNTER — HOSPITAL ENCOUNTER (OUTPATIENT)
Dept: PHYSICAL THERAPY | Age: 65
Setting detail: THERAPIES SERIES
Discharge: HOME | End: 2025-05-14
Payer: COMMERCIAL

## 2025-05-14 PROCEDURE — 97110 THERAPEUTIC EXERCISES: CPT

## 2025-05-14 NOTE — PROGRESS NOTES
Phone: [phone]                 [facility]      Fax: [phone]                            Outpatient Physical Therapy                                                                            Daily Note    Date: 2025  Patient Name: Anthony Malave        MRN: [id number]   ACCT#:  [id number]  : 1960  (65 y.o.)    Referring Provider (secondary): Shankar Barry         Diagnosis: L reverse TSA  Treatment Diagnosis: L shoulder impaired ROM and weakness    Onset Date: 25  PT Insurance Information: [employer]  Total # of Visits Approved: 16 Per Physician Order  Total # of Visits to Date: 7  No Show: 0.01  Canceled Appointment: 0  Plan of Care/Certification Expiration Date: 25    Pre-Treatment Pain:  0/10     Assessment  Assessment: 08 Pt denies pain this morning. After last session, says he had to ice and take tylonel, but hasn't had to take anything since. He still ices occasionally throughout the day. Reports improved ease of taking shirt off, but states it is more difficulty to put shirt on. Pt reports last nigth was the first night he slept without his sling and there were no issues, but he is still sleeping in the recliner. Progress stretching as indicated by protocol next session.    Plan  Continue with current plan of care    Exercises/Modalities/Manual:  See DocFlow Sheet    Education: Educated on alignment during exercise to encourage proper muscle recruitment and reduce compensations            Goals  (Total # of Visits to Date: 7)   Short Term Goals  Time Frame for Short Term Goals: 10 visits  Short Term Goal 1: Pt will demonstrate understanding and compliance w HEP to facilitate recovery. -MET  Short Term Goal 2: Pt will achieve full PROM of the L shoulder to facilitate return to ADLs.  Short Term Goal 3: Pt will achieve 140 deg AROM of L shoulder flexion and abduction to facilitate grooming and bathing.    Long Term Goals  Time Frame for Long Term Goals : 16 Visits  Long

## 2025-05-21 ENCOUNTER — HOSPITAL ENCOUNTER (OUTPATIENT)
Dept: PHYSICAL THERAPY | Age: 65
Setting detail: THERAPIES SERIES
Discharge: HOME | End: 2025-05-21
Payer: COMMERCIAL

## 2025-05-21 PROCEDURE — 97110 THERAPEUTIC EXERCISES: CPT

## 2025-05-21 NOTE — PROGRESS NOTES
Phone: [phone]                 [facility]      Fax: [phone]                            Outpatient Physical Therapy                                                                            Daily Note    Date: 2025  Patient Name: Anthony Malave        MRN: [id number]   ACCT#:  [id number]  : 1960  (65 y.o.)    Referring Provider (secondary): Shankar Barry         Diagnosis: L reverse TSA  Treatment Diagnosis: L shoulder impaired ROM and weakness    Onset Date: 25  PT Insurance Information: [employer]  Total # of Visits Approved: 16 Per Physician Order  Total # of Visits to Date: 8  No Show: 0.01  Canceled Appointment: 0  Plan of Care/Certification Expiration Date: 25    Pre-Treatment Pain:  0/10     Assessment  Assessment: 0800 Pt denies pain in the LUE this session. Pt reports 4-5/10 pain in RUE. Held pulleys this session due to RUE pain. Pt reports LUE feels better, he's been able to do more at home, has less difficulty with dressing. Pt sees  next Thursday.    Plan  Continue with current plan of care    Exercises/Modalities/Manual:  See DocFlow Sheet    Education: Educated on stretches to improve ROM; HEP            Goals  (Total # of Visits to Date: 8)   Short Term Goals  Time Frame for Short Term Goals: 10 visits  Short Term Goal 1: Pt will demonstrate understanding and compliance w HEP to facilitate recovery. -MET  Short Term Goal 2: Pt will achieve full PROM of the L shoulder to facilitate return to ADLs.  Short Term Goal 3: Pt will achieve 140 deg AROM of L shoulder flexion and abduction to facilitate grooming and bathing.    Long Term Goals  Time Frame for Long Term Goals : 16 Visits  Long Term Goal 1: Pt will demonstrate L shoulder strength 4/5 in all muscle groups to facilitate return to ADLs.  Long Term Goal 2: Pt will demontrate ability to lift 5 lbs over the head 5 times to be able to put items into/take them out of the cupboard.  Long Term Goal 3: Pt will

## 2025-05-27 ENCOUNTER — HOSPITAL ENCOUNTER (OUTPATIENT)
Dept: PHYSICAL THERAPY | Age: 65
Setting detail: THERAPIES SERIES
Discharge: HOME | End: 2025-05-27
Payer: COMMERCIAL

## 2025-05-27 NOTE — PROGRESS NOTES
[facility]  Rehab and Wellness    Date: 2025  Patient Name: Anthony Malave        : 1960       Pt Cancelled Appt due to Illness      Ruthann Licona Date: 2025

## 2025-06-04 ENCOUNTER — HOSPITAL ENCOUNTER (OUTPATIENT)
Dept: PHYSICAL THERAPY | Age: 65
Setting detail: THERAPIES SERIES
Discharge: HOME | End: 2025-06-04
Payer: COMMERCIAL

## 2025-06-04 PROCEDURE — 97110 THERAPEUTIC EXERCISES: CPT

## 2025-06-04 NOTE — PROGRESS NOTES
Phone: [phone]                 [facility]      Fax: [phone]                            Outpatient Physical Therapy                                                                            Daily Note    Date: 2025  Patient Name: Anthony Malave        MRN: [id number]   ACCT#:  [id number]  : 1960  (65 y.o.)    Referring Provider : Shankar Barry           Diagnosis: L reverse TSA  Treatment Diagnosis: L shoulder impaired ROM and weakness    Onset Date: 25  PT Insurance Information: [employer]  Total # of Visits Approved: 16 Per Physician Order  Total # of Visits to Date: 9  No Show: 0.01  Canceled Appointment: 0  Plan of Care/Certification Expiration Date: 25    Pre-Treatment Pain:  0/10 left shoulder     Assessment  Assessment: Patient denies pain of left shoulder but reports right shoulder pain 2-7/10 depending on activity level.  Unable to actively elevate right shoulder.   Completed ex with good tolerance noting signficant weakness into left ER; modified home technique to standing with tband as patient now unable to lie on right side.  Also added shld ext tband to HEP and upgraded to use of 1-2# for Is, Ys and Ts.    Patient reports seeing surgeon who is pleased with progress.  Discussing right shoulder surgery in 2 months/August.   Consider discharging further PT for left shoulder rehab in an effort to save visits for right shoulder pre/post op PT.    Plan  Continue with current plan of care    Exercises/Modalities/Manual:  See DocFlow Sheet    Education: upgrade HEP with use of 1-2# and issued lime tband for shld ext            Goals  (Total # of Visits to Date: 9)   Short Term Goals  Time Frame for Short Term Goals: 10 visits  Short Term Goal 1: Pt will demonstrate understanding and compliance w HEP to facilitate recovery. -MET  Short Term Goal 2: Pt will achieve full PROM of the L shoulder to facilitate return to ADLs.  Short Term Goal 3: Pt will achieve 140 deg AROM of L

## 2025-07-30 ENCOUNTER — HOSPITAL ENCOUNTER (OUTPATIENT)
Dept: PHYSICAL THERAPY | Age: 65
Setting detail: THERAPIES SERIES
Discharge: HOME | End: 2025-07-30
Payer: COMMERCIAL

## 2025-07-30 PROCEDURE — 97161 PT EVAL LOW COMPLEX 20 MIN: CPT

## 2025-07-30 ASSESSMENT — PAIN SCALES - GENERAL: PAINLEVEL_OUTOF10: 2

## 2025-07-30 NOTE — THERAPY EVALUATION
Phone: [phone]                       [facility]         Fax: [phone]                      Outpatient Physical Therapy                                                                      Evaluation    Date: 2025  Patient: Anthony Malave  : 1960  ACCT #: [id number]    Referring Provider: Dr. Shankar Barry      Diagnosis: Right shoulder pain    Treatment Diagnosis: Right shoulder pain/weakness  Onset Date: 25  PT Insurance Information: [facility]  Total # of Visits Approved: 7 (7 remaining from 20 max) Per Physician Order  Total # of Visits to Date: 1  No Show: 0  Canceled Appointment: 0     Subjective     Additional Pertinent Hx: Chronic right shoulder pain with progressive loss of motion and functional use along with pain upon movement.  Patient reports exacerbation of symptoms late May when working on a mower hearing an audible \"pop\".  Since he has experienced increased pain and limited function.   Patient to orthopedic surgeon who is recommending reverse total shoulder replacement.  Patient recently underwent left TSR in April with good results.  UEFS=49/80  Pain Assessment  Pain Level: 2     IADL History  Active : Yes  Occupation: Retired  Leisure & Hobbies: Normally active with daily household tasks and outdoor activities however is limited due to functional weakness and limited ROM RUE    Objective  Vision  Vision: Within Functional Limits  Hearing  Hearing: Within functional limits  Observation/Palpation  Posture: Fair     Strength RUE  Comment: Generally 4+/5 for elbove flex/ext;  shoulder 3/5 within available ROM below 90 deg    AROM RUE (degrees)  R Shoulder Flexion (0-180): 90 deg  R Shoulder ABduction (0-180): 80 deg  R Shoulder Int Rotation  (0-70): Functionally able to touch back pocket but not to beltline  R Shoulder Ext Rotation (0-90): Functionally able to touch base of head     PROM RUE (degrees)  R Shoulder Flex  (0-180): 90 deg  R Shoulder ABduction

## 2025-07-30 NOTE — PLAN OF CARE
[facility]       Phone: [phone]   Date: 2025                      Outpatient Physical Therapy  Fax: [phone]    ACCT #: [id number]                     Plan of Care  [facility]#: [id number]  Patient: Anthony Malave  : 1960    Referring Provider: Dr. Shankar Barry      Diagnosis: Right shoulder pain  Onset Date: 25    Treatment Diagnosis: Right shoulder pain/weakness    Assessment  Assessment: Patient presents with progressive right shoulder pain with limited ROM and strength and inability to complete self-care tasks using Right UE or ADL's.   Plan to progress with strengthening however symptoms and mobility suggestive of rotator cuff tear which may require surgical intervention  Therapy Prognosis: Fair    Treatment Plan :  Days: 1 times per week Weeks: 8 weeks Total # of Visits Approved: 7 (7 remaining from 20 max)    Patient Education/HEP, Therapeutic Exercise, and Manual Therapy     Goals  Short Term Goal 1: STG=LTG    Time Frame for Long Term Goals : 10 visits  Long Term Goal 1: Educate on home program for GH and scapular/rotator cuff strengthening to improve functional use  Long Term Goal 2: PROM flex 140 deg and abduction 120 deg to improve GH movement  Long Term Goal 3: Subjective improvement in functional completion of self-care tasks  Long Term Goal 4: UEFS >50/80 to indicate improve functional use     SOFIE LEWIS PT   Date: 2025    ______________________________________ Date: 2025  Physician Signature  By signing above or cosigning electronically, I have reviewed this Plan of Care and certify a need for medically necessary rehabilitation services.

## 2025-08-06 ENCOUNTER — HOSPITAL ENCOUNTER (OUTPATIENT)
Dept: PHYSICAL THERAPY | Age: 65
Setting detail: THERAPIES SERIES
Discharge: HOME | End: 2025-08-06
Payer: COMMERCIAL

## 2025-08-06 PROCEDURE — 97110 THERAPEUTIC EXERCISES: CPT

## 2025-08-13 ENCOUNTER — HOSPITAL ENCOUNTER (OUTPATIENT)
Dept: PHYSICAL THERAPY | Age: 65
Setting detail: THERAPIES SERIES
Discharge: HOME | End: 2025-08-13
Payer: COMMERCIAL

## 2025-08-13 PROCEDURE — 97110 THERAPEUTIC EXERCISES: CPT

## 2025-08-20 ENCOUNTER — HOSPITAL ENCOUNTER (OUTPATIENT)
Dept: PHYSICAL THERAPY | Age: 65
Setting detail: THERAPIES SERIES
Discharge: HOME | End: 2025-08-20
Payer: COMMERCIAL

## 2025-08-20 PROCEDURE — 97110 THERAPEUTIC EXERCISES: CPT

## 2025-08-21 RX ORDER — LOSARTAN POTASSIUM 100 MG/1
100 TABLET ORAL DAILY
Qty: 90 TABLET | Refills: 1 | Status: SHIPPED | OUTPATIENT
Start: 2025-08-21

## 2025-08-21 RX ORDER — DOXAZOSIN 4 MG/1
4 TABLET ORAL NIGHTLY
Qty: 90 TABLET | Refills: 1 | Status: SHIPPED | OUTPATIENT
Start: 2025-08-21

## 2025-08-21 RX ORDER — HYDROCHLOROTHIAZIDE 25 MG/1
25 TABLET ORAL DAILY
Qty: 90 TABLET | Refills: 1 | Status: SHIPPED | OUTPATIENT
Start: 2025-08-21

## 2025-08-29 ENCOUNTER — OFFICE VISIT (OUTPATIENT)
Dept: FAMILY MEDICINE CLINIC | Age: 65
End: 2025-08-29
Payer: COMMERCIAL

## 2025-08-29 VITALS
SYSTOLIC BLOOD PRESSURE: 138 MMHG | HEART RATE: 71 BPM | WEIGHT: 247 LBS | HEIGHT: 70 IN | OXYGEN SATURATION: 96 % | BODY MASS INDEX: 35.36 KG/M2 | DIASTOLIC BLOOD PRESSURE: 86 MMHG

## 2025-08-29 DIAGNOSIS — I10 ESSENTIAL HYPERTENSION: Primary | ICD-10-CM

## 2025-08-29 DIAGNOSIS — J30.9 CHRONIC ALLERGIC RHINITIS: ICD-10-CM

## 2025-08-29 PROCEDURE — 3075F SYST BP GE 130 - 139MM HG: CPT | Performed by: FAMILY MEDICINE

## 2025-08-29 PROCEDURE — 99213 OFFICE O/P EST LOW 20 MIN: CPT | Performed by: FAMILY MEDICINE

## 2025-08-29 PROCEDURE — 3079F DIAST BP 80-89 MM HG: CPT | Performed by: FAMILY MEDICINE

## 2025-08-29 RX ORDER — METOPROLOL SUCCINATE 25 MG/1
25 TABLET, EXTENDED RELEASE ORAL DAILY
Qty: 90 TABLET | Refills: 1 | Status: SHIPPED | OUTPATIENT
Start: 2025-08-29

## 2025-08-29 RX ORDER — MELOXICAM 15 MG/1
15 TABLET ORAL DAILY
Qty: 90 TABLET | Refills: 1 | Status: SHIPPED | OUTPATIENT
Start: 2025-08-29

## 2025-08-29 ASSESSMENT — PATIENT HEALTH QUESTIONNAIRE - PHQ9
2. FEELING DOWN, DEPRESSED OR HOPELESS: NOT AT ALL
SUM OF ALL RESPONSES TO PHQ QUESTIONS 1-9: 0
1. LITTLE INTEREST OR PLEASURE IN DOING THINGS: NOT AT ALL